# Patient Record
Sex: MALE | Race: WHITE | NOT HISPANIC OR LATINO | Employment: OTHER | URBAN - METROPOLITAN AREA
[De-identification: names, ages, dates, MRNs, and addresses within clinical notes are randomized per-mention and may not be internally consistent; named-entity substitution may affect disease eponyms.]

---

## 2018-05-03 ENCOUNTER — OFFICE VISIT (OUTPATIENT)
Dept: FAMILY MEDICINE | Facility: CLINIC | Age: 58
End: 2018-05-03
Payer: COMMERCIAL

## 2018-05-03 VITALS
SYSTOLIC BLOOD PRESSURE: 130 MMHG | DIASTOLIC BLOOD PRESSURE: 80 MMHG | HEART RATE: 76 BPM | OXYGEN SATURATION: 97 % | TEMPERATURE: 98 F | HEIGHT: 70 IN | WEIGHT: 207.25 LBS | BODY MASS INDEX: 29.67 KG/M2

## 2018-05-03 DIAGNOSIS — L25.9 CONTACT DERMATITIS, UNSPECIFIED CONTACT DERMATITIS TYPE, UNSPECIFIED TRIGGER: ICD-10-CM

## 2018-05-03 DIAGNOSIS — Z12.5 SCREENING FOR PROSTATE CANCER: ICD-10-CM

## 2018-05-03 DIAGNOSIS — H81.10 BENIGN PAROXYSMAL POSITIONAL VERTIGO, UNSPECIFIED LATERALITY: ICD-10-CM

## 2018-05-03 DIAGNOSIS — Z00.00 ANNUAL PHYSICAL EXAM: ICD-10-CM

## 2018-05-03 DIAGNOSIS — M25.551 CHRONIC RIGHT HIP PAIN: ICD-10-CM

## 2018-05-03 DIAGNOSIS — R06.83 SNORING: Primary | ICD-10-CM

## 2018-05-03 DIAGNOSIS — R42 VERTIGO: ICD-10-CM

## 2018-05-03 DIAGNOSIS — Z12.11 COLON CANCER SCREENING: ICD-10-CM

## 2018-05-03 DIAGNOSIS — G89.29 CHRONIC RIGHT HIP PAIN: ICD-10-CM

## 2018-05-03 PROCEDURE — 99204 OFFICE O/P NEW MOD 45 MIN: CPT | Mod: S$GLB,,, | Performed by: FAMILY MEDICINE

## 2018-05-03 PROCEDURE — 99999 PR PBB SHADOW E&M-NEW PATIENT-LVL III: CPT | Mod: PBBFAC,,, | Performed by: FAMILY MEDICINE

## 2018-05-03 RX ORDER — METHYLPREDNISOLONE 4 MG/1
TABLET ORAL
Qty: 1 PACKAGE | Refills: 0 | Status: SHIPPED | OUTPATIENT
Start: 2018-05-03 | End: 2018-05-24

## 2018-05-03 RX ORDER — MECLIZINE HYDROCHLORIDE 25 MG/1
25 TABLET ORAL 3 TIMES DAILY PRN
Qty: 30 TABLET | Refills: 2 | Status: SHIPPED | OUTPATIENT
Start: 2018-05-03 | End: 2022-09-16 | Stop reason: SDUPTHER

## 2018-05-03 RX ORDER — DICLOFENAC SODIUM 50 MG/1
50 TABLET, DELAYED RELEASE ORAL 2 TIMES DAILY PRN
Qty: 60 TABLET | Refills: 2 | Status: SHIPPED | OUTPATIENT
Start: 2018-05-03 | End: 2018-06-05 | Stop reason: SDUPTHER

## 2018-05-03 RX ORDER — TRIAMCINOLONE ACETONIDE 1 MG/G
CREAM TOPICAL 2 TIMES DAILY
Qty: 453 G | Refills: 0 | Status: SHIPPED | OUTPATIENT
Start: 2018-05-03 | End: 2021-11-15
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
## 2018-05-04 ENCOUNTER — PATIENT MESSAGE (OUTPATIENT)
Dept: FAMILY MEDICINE | Facility: CLINIC | Age: 58
End: 2018-05-04

## 2018-05-04 ENCOUNTER — LAB VISIT (OUTPATIENT)
Dept: LAB | Facility: HOSPITAL | Age: 58
End: 2018-05-04
Attending: FAMILY MEDICINE
Payer: COMMERCIAL

## 2018-05-04 DIAGNOSIS — Z12.5 SCREENING FOR PROSTATE CANCER: ICD-10-CM

## 2018-05-04 DIAGNOSIS — Z00.00 ANNUAL PHYSICAL EXAM: ICD-10-CM

## 2018-05-04 DIAGNOSIS — M16.11 ARTHRITIS OF RIGHT HIP: Primary | ICD-10-CM

## 2018-05-04 LAB
ALBUMIN SERPL BCP-MCNC: 3.8 G/DL
ALP SERPL-CCNC: 99 U/L
ALT SERPL W/O P-5'-P-CCNC: 35 U/L
ANION GAP SERPL CALC-SCNC: 6 MMOL/L
AST SERPL-CCNC: 23 U/L
BASOPHILS # BLD AUTO: 0.03 K/UL
BASOPHILS NFR BLD: 0.4 %
BILIRUB SERPL-MCNC: 0.6 MG/DL
BUN SERPL-MCNC: 17 MG/DL
CALCIUM SERPL-MCNC: 9.8 MG/DL
CHLORIDE SERPL-SCNC: 104 MMOL/L
CHOLEST SERPL-MCNC: 242 MG/DL
CHOLEST/HDLC SERPL: 5.4 {RATIO}
CO2 SERPL-SCNC: 28 MMOL/L
COMPLEXED PSA SERPL-MCNC: 1.5 NG/ML
CREAT SERPL-MCNC: 1.3 MG/DL
DIFFERENTIAL METHOD: ABNORMAL
EOSINOPHIL # BLD AUTO: 0.1 K/UL
EOSINOPHIL NFR BLD: 1 %
ERYTHROCYTE [DISTWIDTH] IN BLOOD BY AUTOMATED COUNT: 12.7 %
EST. GFR  (AFRICAN AMERICAN): >60 ML/MIN/1.73 M^2
EST. GFR  (NON AFRICAN AMERICAN): >60 ML/MIN/1.73 M^2
GLUCOSE SERPL-MCNC: 112 MG/DL
HCT VFR BLD AUTO: 45.8 %
HDLC SERPL-MCNC: 45 MG/DL
HDLC SERPL: 18.6 %
HGB BLD-MCNC: 15.7 G/DL
LDLC SERPL CALC-MCNC: 172 MG/DL
LYMPHOCYTES # BLD AUTO: 1.3 K/UL
LYMPHOCYTES NFR BLD: 17 %
MCH RBC QN AUTO: 28.7 PG
MCHC RBC AUTO-ENTMCNC: 34.3 G/DL
MCV RBC AUTO: 84 FL
MONOCYTES # BLD AUTO: 0.3 K/UL
MONOCYTES NFR BLD: 3.8 %
NEUTROPHILS # BLD AUTO: 6.1 K/UL
NEUTROPHILS NFR BLD: 77.8 %
NONHDLC SERPL-MCNC: 197 MG/DL
PLATELET # BLD AUTO: 234 K/UL
PMV BLD AUTO: 11.3 FL
POTASSIUM SERPL-SCNC: 5.7 MMOL/L
PROT SERPL-MCNC: 8.1 G/DL
RBC # BLD AUTO: 5.47 M/UL
SODIUM SERPL-SCNC: 138 MMOL/L
TRIGL SERPL-MCNC: 125 MG/DL
WBC # BLD AUTO: 7.81 K/UL

## 2018-05-04 PROCEDURE — 80053 COMPREHEN METABOLIC PANEL: CPT

## 2018-05-04 PROCEDURE — 85025 COMPLETE CBC W/AUTO DIFF WBC: CPT

## 2018-05-04 PROCEDURE — 36415 COLL VENOUS BLD VENIPUNCTURE: CPT | Mod: PN

## 2018-05-04 PROCEDURE — 84153 ASSAY OF PSA TOTAL: CPT

## 2018-05-04 PROCEDURE — 80061 LIPID PANEL: CPT

## 2018-05-08 ENCOUNTER — PATIENT MESSAGE (OUTPATIENT)
Dept: FAMILY MEDICINE | Facility: CLINIC | Age: 58
End: 2018-05-08

## 2018-05-08 DIAGNOSIS — R73.01 IMPAIRED FASTING GLUCOSE: Primary | ICD-10-CM

## 2018-05-09 ENCOUNTER — TELEPHONE (OUTPATIENT)
Dept: FAMILY MEDICINE | Facility: CLINIC | Age: 58
End: 2018-05-09

## 2018-05-09 DIAGNOSIS — Z12.11 SCREEN FOR COLON CANCER: Primary | ICD-10-CM

## 2018-05-09 DIAGNOSIS — R06.83 SNORING: Primary | ICD-10-CM

## 2018-05-09 DIAGNOSIS — R53.83 FATIGUE, UNSPECIFIED TYPE: ICD-10-CM

## 2018-05-15 ENCOUNTER — TELEPHONE (OUTPATIENT)
Dept: FAMILY MEDICINE | Facility: CLINIC | Age: 58
End: 2018-05-15

## 2018-05-17 ENCOUNTER — OFFICE VISIT (OUTPATIENT)
Dept: ORTHOPEDICS | Facility: CLINIC | Age: 58
End: 2018-05-17
Payer: COMMERCIAL

## 2018-05-17 VITALS — WEIGHT: 205.38 LBS | HEIGHT: 70 IN | BODY MASS INDEX: 29.4 KG/M2

## 2018-05-17 DIAGNOSIS — M16.11 PRIMARY OSTEOARTHRITIS OF RIGHT HIP: Primary | ICD-10-CM

## 2018-05-17 PROCEDURE — 99203 OFFICE O/P NEW LOW 30 MIN: CPT | Mod: S$GLB,,, | Performed by: PHYSICIAN ASSISTANT

## 2018-05-17 PROCEDURE — 3008F BODY MASS INDEX DOCD: CPT | Mod: CPTII,S$GLB,, | Performed by: PHYSICIAN ASSISTANT

## 2018-05-17 PROCEDURE — 99999 PR PBB SHADOW E&M-EST. PATIENT-LVL III: CPT | Mod: PBBFAC,,, | Performed by: PHYSICIAN ASSISTANT

## 2018-05-17 RX ORDER — MELOXICAM 15 MG/1
15 TABLET ORAL DAILY
Qty: 30 TABLET | Refills: 1 | Status: SHIPPED | OUTPATIENT
Start: 2018-05-17 | End: 2018-06-16
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
## 2018-05-17 NOTE — LETTER
May 17, 2018      Socorro Payton MD  [address] [place] [address]           [facility] - Orthopedics  [address], [address]  [facility] [address]  Phone: [phone]          Patient: Pierre Grossman   MR Number: [id number]   YOB: 1960   Date of Visit: 5/17/2018       Dear Dr. Socorro Payton:    Thank you for referring Pierre Grossman to me for evaluation. Attached you will find relevant portions of my assessment and plan of care.    If you have questions, please do not hesitate to call me. I look forward to following Pierre Grossman along with you.    Sincerely,    Helen Wells PA-C    Enclosure  CC:  No Recipients    If you would like to receive this communication electronically, please contact [email or web address] or [phone] to request more information on [employer] Link access.    For providers and/or their staff who would like to refer a patient to [facility], please contact us through our one-stop-shop provider referral line, [facility], at [phone].    If you feel you have received this communication in error or would no longer like to receive these types of communications, please e-mail [email or web address]

## 2018-05-18 ENCOUNTER — PATIENT MESSAGE (OUTPATIENT)
Dept: FAMILY MEDICINE | Facility: CLINIC | Age: 58
End: 2018-05-18

## 2018-05-30 ENCOUNTER — ANESTHESIA EVENT (OUTPATIENT)
Dept: ENDOSCOPY | Facility: HOSPITAL | Age: 58
End: 2018-05-30
Payer: COMMERCIAL

## 2018-05-30 ENCOUNTER — ANESTHESIA (OUTPATIENT)
Dept: ENDOSCOPY | Facility: HOSPITAL | Age: 58
End: 2018-05-30
Payer: COMMERCIAL

## 2018-05-30 ENCOUNTER — HOSPITAL ENCOUNTER (OUTPATIENT)
Facility: HOSPITAL | Age: 58
Discharge: HOME | End: 2018-05-30
Attending: INTERNAL MEDICINE | Admitting: INTERNAL MEDICINE
Payer: COMMERCIAL

## 2018-05-30 VITALS
RESPIRATION RATE: 18 BRPM | WEIGHT: 201 LBS | SYSTOLIC BLOOD PRESSURE: 152 MMHG | HEIGHT: 70 IN | BODY MASS INDEX: 28.77 KG/M2 | HEART RATE: 62 BPM | TEMPERATURE: 98 F | DIASTOLIC BLOOD PRESSURE: 89 MMHG | OXYGEN SATURATION: 99 %

## 2018-05-30 DIAGNOSIS — Z12.11 ENCOUNTER FOR SCREENING COLONOSCOPY: ICD-10-CM

## 2018-05-30 PROCEDURE — 37000009 HC ANESTHESIA EA ADD 15 MINS: Performed by: INTERNAL MEDICINE

## 2018-05-30 PROCEDURE — D9220A PRA ANESTHESIA: Mod: ANES,,, | Performed by: ANESTHESIOLOGY

## 2018-05-30 PROCEDURE — 63600175 PHARM REV CODE 636 W HCPCS: Performed by: REGISTERED NURSE

## 2018-05-30 PROCEDURE — 25000003 PHARM REV CODE 250: Performed by: ANESTHESIOLOGY

## 2018-05-30 PROCEDURE — D9220A PRA ANESTHESIA: Mod: CRNA,,, | Performed by: REGISTERED NURSE

## 2018-05-30 PROCEDURE — G0121 COLON CA SCRN NOT HI RSK IND: HCPCS | Performed by: INTERNAL MEDICINE

## 2018-05-30 PROCEDURE — 37000008 HC ANESTHESIA 1ST 15 MINUTES: Performed by: INTERNAL MEDICINE

## 2018-05-30 RX ORDER — LIDOCAINE HYDROCHLORIDE 20 MG/ML
INJECTION, SOLUTION EPIDURAL; INFILTRATION; INTRACAUDAL; PERINEURAL
Status: DISCONTINUED
Start: 2018-05-30 | End: 2018-05-30 | Stop reason: HOSPADM

## 2018-05-30 RX ORDER — LIDOCAINE HYDROCHLORIDE 10 MG/ML
1 INJECTION, SOLUTION EPIDURAL; INFILTRATION; INTRACAUDAL; PERINEURAL ONCE
Status: DISCONTINUED | OUTPATIENT
Start: 2018-05-30 | End: 2018-05-30 | Stop reason: HOSPADM

## 2018-05-30 RX ORDER — PROPOFOL 10 MG/ML
VIAL (ML) INTRAVENOUS
Status: DISCONTINUED | OUTPATIENT
Start: 2018-05-30 | End: 2018-05-30

## 2018-05-30 RX ORDER — PROPOFOL 10 MG/ML
VIAL (ML) INTRAVENOUS
Status: DISCONTINUED
Start: 2018-05-30 | End: 2018-05-30 | Stop reason: HOSPADM

## 2018-05-30 RX ORDER — SODIUM CHLORIDE 9 MG/ML
INJECTION, SOLUTION INTRAVENOUS CONTINUOUS
Status: DISCONTINUED | OUTPATIENT
Start: 2018-05-30 | End: 2018-05-30 | Stop reason: HOSPADM

## 2018-05-30 RX ORDER — LIDOCAINE HCL/PF 100 MG/5ML
SYRINGE (ML) INTRAVENOUS
Status: DISCONTINUED | OUTPATIENT
Start: 2018-05-30 | End: 2018-05-30

## 2018-05-30 RX ADMIN — PROPOFOL 70 MG: 10 INJECTION, EMULSION INTRAVENOUS at 08:05

## 2018-05-30 RX ADMIN — PROPOFOL 30 MG: 10 INJECTION, EMULSION INTRAVENOUS at 08:05

## 2018-05-30 RX ADMIN — LIDOCAINE HYDROCHLORIDE 100 MG: 20 INJECTION, SOLUTION INTRAVENOUS at 08:05

## 2018-05-30 RX ADMIN — PROPOFOL 20 MG: 10 INJECTION, EMULSION INTRAVENOUS at 08:05

## 2018-05-30 RX ADMIN — SODIUM CHLORIDE: 0.9 INJECTION, SOLUTION INTRAVENOUS at 07:05

## 2018-05-30 NOTE — PROVATION PATIENT INSTRUCTIONS
Discharge Summary/Instructions after an Endoscopic Procedure  Patient Name: Pierre Grossman  Patient MRN: [id number]  Patient YOB: 1960  Wednesday, May 30, 2018  Zakia Mirza MD  RESTRICTIONS:  During your procedure today, you received medications for sedation.  These   medications may affect your judgment, balance and coordination.  Therefore,   for 24 hours, you have the following restrictions:   - DO NOT drive a car, operate machinery, make legal/financial decisions,   sign important papers or drink alcohol.    ACTIVITY:  The following day: return to full activity including work, except no heavy   lifting, straining or running for 3 days if polyps were removed.  DIET:  Eat and drink normally unless instructed otherwise.     TREATMENT FOR COMMON SIDE EFFECTS:  - Mild abdominal pain, nausea, belching, bloating or excessive gas:  rest,   eat lightly and use a heating pad.  - Sore Throat: treat with throat lozenges and/or gargle with warm salt   water.  - Because air was used during the procedure, expelling large amounts of air   from your rectum or belching is normal.  - If a bowel prep was taken, you may not have a bowel movement for 1-3 days.    This is normal.  SYMPTOMS TO WATCH FOR AND REPORT TO YOUR PHYSICIAN:  1. Abdominal pain or bloating, other than gas cramps.  2. Chest pain.  3. Back pain.  4. Signs of infection such as: chills or fever occurring within 24 hours   after the procedure.  5. Rectal bleeding, which would show as bright red, maroon, or black stools.   (A tablespoon of blood from the rectum is not serious, especially if   hemorrhoids are present.)  6. Vomiting.  7. Weakness or dizziness.  GO DIRECTLY TO THE NEAREST EMERGENCY ROOM IF YOU HAVE ANY OF THE FOLLOWING:      Difficulty breathing              Chills and/or fever over 101 F   Persistent vomiting and/or vomiting blood   Severe abdominal pain   Severe chest pain   Black, tarry stools   Bleeding- more than one  tablespoon   Any other symptom or condition that you feel may need urgent attention  Your doctor recommends these additional instructions:  If any biopsies were taken, your doctors clinic will contact you in 1 to 2   weeks with any results.  - Discharge patient to home (with escort).   - Resume previous diet indefinitely.   - Repeat colonoscopy in 10 years for screening purposes.   - Return to primary care physician as previously scheduled.  For questions, problems or results please call your physician - Zakia Mirza MD at Work:  [phone].  [facility] can be reached at [phone]     IF A COMPLICATION OR EMERGENCY SITUATION ARISES AND YOU ARE UNABLE TO REACH   YOUR PHYSICIAN - GO DIRECTLY TO THE EMERGENCY ROOM.  Zakia Mirza MD  5/30/2018 8:44:45 AM  This report has been verified and signed electronically.  PROVATION

## 2018-05-30 NOTE — H&P
Pre-Procedure H&P:  Reason for Procedure: screen    HPI:  Pt is a 58 y.o. male screen    Past Medical History:   Diagnosis Date    Head injury 1986       Past Surgical History:   Procedure Laterality Date    CERVICAL FUSION      EYE SURGERY      cataract    HERNIA REPAIR         Family History   Problem Relation Age of Onset    Aneurysm Mother         brain    Pancreatitis Father     Heart attack Neg Hx     Hypertension Neg Hx        Social History     Social History    Marital status:      Spouse name: N/A    Number of children: N/A    Years of education: N/A     Occupational History    Not on file.     Social History Main Topics    Smoking status: Never Smoker    Smokeless tobacco: Never Used    Alcohol use No    Drug use: No    Sexual activity: Yes     Partners: Female     Other Topics Concern    Not on file     Social History Narrative    No narrative on file       Endoscopic History:      Review of patient's allergies indicates:   Allergen Reactions    Iodinated contrast- oral and iv dye Shortness Of Breath and Swelling       No current facility-administered medications on file prior to encounter.      Current Outpatient Prescriptions on File Prior to Encounter   Medication Sig Dispense Refill    diclofenac (VOLTAREN) 50 MG EC tablet Take 1 tablet (50 mg total) by mouth 2 (two) times daily as needed. 60 tablet 2    meclizine (ANTIVERT) 25 mg tablet Take 1 tablet (25 mg total) by mouth 3 (three) times daily as needed. 30 tablet 2    triamcinolone acetonide 0.1% (KENALOG) 0.1 % cream Apply topically 2 (two) times daily. 453 g 0       Current Facility-Administered Medications:     0.9%  NaCl infusion, , Intravenous, Continuous, Clyde Branch Jr., MD, Last Rate: 10 mL/hr at 05/30/18 0753    lidocaine (PF) 10 mg/ml (1%) injection 10 mg, 1 mL, Intradermal, Once, Clyde Branch Jr., MD    ROS: Negative x 10    Patient Vitals for the past 24 hrs:   BP Temp Temp src Resp SpO2 Height  "Weight   05/30/18 0750 (!) 147/82 97.5 °F (36.4 °C) Oral 16 95 % 5' 10" (1.778 m) 91.2 kg (201 lb)       Gen: Well developed, well nourished, no apparent distress  HEENT: Anicteric, PERRLA  CV: S1, S2, no murmers/rubs, non-displaced PMI  Lungs: CTA-B, normal excursion  Abd: Soft, NT, ND, normal BS's, no HSM  Ext: No c/c/e, 1+ DP pulses to BLE's  Neuro: CN II-XII grossly intact, no asterixis.  Skin: No rashes/lesions.  Psych: AA&O x 4    Assessment:  Pt. Is a 58 y.o. male with:  1. screen    Recommendations:  1. Colonoscopy  2. F/U with PCP      I would like to take this opportunity to thank you for this consult.  If you have any questions or concerns, please do not hesitate to contact me.       "

## 2018-05-30 NOTE — TRANSFER OF CARE
"Anesthesia Transfer of Care Note    Patient: Pierre Grossman    Procedure(s) Performed: Procedure(s) (LRB):  COLONOSCOPY (N/A)    Patient location: GI    Anesthesia Type: general    Transport from OR: Transported from OR on room air with adequate spontaneous ventilation    Post pain: adequate analgesia    Post assessment: no apparent anesthetic complications and tolerated procedure well    Post vital signs: stable    Level of consciousness: responds to stimulation    Nausea/Vomiting: no nausea/vomiting    Complications: none    Transfer of care protocol was followed      Last vitals:   Visit Vitals  /68   Pulse 61   Temp 36.8 °C (98.2 °F) (Oral)   Resp 18   Ht 5' 10" (1.778 m)   Wt 91.2 kg (201 lb)   SpO2 98%   BMI 28.84 kg/m²     "

## 2018-05-30 NOTE — ANESTHESIA PREPROCEDURE EVALUATION
05/30/2018  Pierre Grossman is a 58 y.o., male.    Pre-op diagnosis: Colon cancer screening [Z12.11]    Pre-operative evaluation for Procedure(s) (LRB):  COLONOSCOPY (N/A)    Review of patient's allergies indicates:   Allergen Reactions    Iodinated contrast- oral and iv dye Shortness Of Breath and Swelling       No current facility-administered medications on file prior to encounter.      Current Outpatient Prescriptions on File Prior to Encounter   Medication Sig Dispense Refill    diclofenac (VOLTAREN) 50 MG EC tablet Take 1 tablet (50 mg total) by mouth 2 (two) times daily as needed. 60 tablet 2    meclizine (ANTIVERT) 25 mg tablet Take 1 tablet (25 mg total) by mouth 3 (three) times daily as needed. 30 tablet 2    triamcinolone acetonide 0.1% (KENALOG) 0.1 % cream Apply topically 2 (two) times daily. 453 g 0       There is no problem list on file for this patient.      Past Surgical History:   Procedure Laterality Date    CERVICAL FUSION      EYE SURGERY      cataract    HERNIA REPAIR             No results for input(s): HCT in the last 72 hours.  No results for input(s): PLT in the last 72 hours.  No results for input(s): K in the last 72 hours.  No results for input(s): CREATININE in the last 72 hours.  No results for input(s): GLU in the last 72 hours.  No results for input(s): PT in the last 72 hours.                    Anesthesia Evaluation         Review of Systems  Anesthesia Hx:  No problems with previous Anesthesia Gets nausea with pain meds, says phenergan and zofran usually help   Social:  Non-Smoker, No Alcohol Use    Hematology/Oncology:  Hematology Normal   Oncology Normal   Hematology Comments: Potassium 5.7    Cardiovascular:   Denies Hypertension.  Denies MI.    Denies Angina.    Pulmonary:  Pulmonary Normal  Denies COPD.  Denies Asthma.  Denies Shortness of breath.    Renal/:    Denies Chronic Renal Disease.     Hepatic/GI:   Denies Liver Disease.    Neurological:   Denies TIA. Denies CVA. Denies Seizures.    Endocrine:   Denies Diabetes.        Physical Exam  General:  Well nourished    Airway/Jaw/Neck:  Airway Findings: Mouth Opening: Normal Tongue: Normal  General Airway Assessment: Adult, Average  Mallampati: II  TM Distance: Normal, at least 6 cm  Jaw/Neck Findings:  Neck ROM: Normal ROM       Chest/Lungs:  Chest/Lungs Findings: Clear to auscultation     Heart/Vascular:  Heart Findings: Rate: Normal  Rhythm: Regular Rhythm  Sounds: Normal        Mental Status:  Mental Status Findings:  Cooperative, Alert and Oriented         Anesthesia Plan  Type of Anesthesia, risks & benefits discussed:  Anesthesia Type:  general  Patient's Preference:   Intra-op Monitoring Plan:   Intra-op Monitoring Plan Comments:   Post Op Pain Control Plan:   Post Op Pain Control Plan Comments: As per surgeon's plan  Induction:   IV  Beta Blocker:  Patient is not currently on a Beta-Blocker (No further documentation required).       Informed Consent: Patient understands risks and agrees with Anesthesia plan.  Questions answered. Anesthesia consent signed with patient.  ASA Score: 2     Day of Surgery Review of History & Physical:    H&P update referred to the surgeon.         Ready For Surgery From Anesthesia Perspective.

## 2018-05-30 NOTE — ANESTHESIA POSTPROCEDURE EVALUATION
"Anesthesia Post Evaluation    Patient: Pierre Grossman    Procedure(s) Performed: Procedure(s) (LRB):  COLONOSCOPY (N/A)    Final Anesthesia Type: general  Patient location during evaluation: PACU  Patient participation: Yes- Able to Participate  Level of consciousness: awake and alert and oriented  Post-procedure vital signs: reviewed and stable  Pain management: adequate  Airway patency: patent  PONV status at discharge: No PONV  Anesthetic complications: no      Cardiovascular status: stable  Respiratory status: unassisted, spontaneous ventilation and room air  Hydration status: euvolemic  Follow-up not needed.        Visit Vitals  /68   Pulse 61   Temp 36.8 °C (98.2 °F) (Oral)   Resp 18   Ht 5' 10" (1.778 m)   Wt 91.2 kg (201 lb)   SpO2 98%   BMI 28.84 kg/m²       Pain/Mark Score: Pain Assessment Performed: Yes (5/30/2018  7:45 AM)  Presence of Pain: denies (5/30/2018  8:47 AM)  Mark Score: 10 (5/30/2018  8:47 AM)      "

## 2018-06-05 ENCOUNTER — OFFICE VISIT (OUTPATIENT)
Dept: ORTHOPEDICS | Facility: CLINIC | Age: 58
End: 2018-06-05
Payer: COMMERCIAL

## 2018-06-05 ENCOUNTER — HOSPITAL ENCOUNTER (OUTPATIENT)
Dept: RADIOLOGY | Facility: HOSPITAL | Age: 58
Discharge: HOME | End: 2018-06-05
Attending: ORTHOPAEDIC SURGERY
Payer: COMMERCIAL

## 2018-06-05 VITALS
BODY MASS INDEX: 30.15 KG/M2 | SYSTOLIC BLOOD PRESSURE: 164 MMHG | HEART RATE: 62 BPM | WEIGHT: 210.63 LBS | DIASTOLIC BLOOD PRESSURE: 97 MMHG | HEIGHT: 70 IN

## 2018-06-05 DIAGNOSIS — M16.11 PRIMARY OSTEOARTHRITIS OF RIGHT HIP: ICD-10-CM

## 2018-06-05 DIAGNOSIS — M16.11 PRIMARY OSTEOARTHRITIS OF RIGHT HIP: Primary | ICD-10-CM

## 2018-06-05 DIAGNOSIS — G89.29 CHRONIC RIGHT HIP PAIN: ICD-10-CM

## 2018-06-05 DIAGNOSIS — M25.551 CHRONIC RIGHT HIP PAIN: ICD-10-CM

## 2018-06-05 PROCEDURE — 73502 X-RAY EXAM HIP UNI 2-3 VIEWS: CPT | Mod: 26,RT,, | Performed by: RADIOLOGY

## 2018-06-05 PROCEDURE — 99999 PR PBB SHADOW E&M-EST. PATIENT-LVL III: CPT | Mod: PBBFAC,,, | Performed by: ORTHOPAEDIC SURGERY

## 2018-06-05 PROCEDURE — 3008F BODY MASS INDEX DOCD: CPT | Mod: CPTII,S$GLB,, | Performed by: ORTHOPAEDIC SURGERY

## 2018-06-05 PROCEDURE — 99214 OFFICE O/P EST MOD 30 MIN: CPT | Mod: S$GLB,,, | Performed by: ORTHOPAEDIC SURGERY

## 2018-06-05 PROCEDURE — 73502 X-RAY EXAM HIP UNI 2-3 VIEWS: CPT | Mod: TC,RT

## 2018-06-05 RX ORDER — DICLOFENAC SODIUM 50 MG/1
50 TABLET, DELAYED RELEASE ORAL 2 TIMES DAILY PRN
Qty: 60 TABLET | Refills: 2 | Status: SHIPPED | OUTPATIENT
Start: 2018-06-05 | End: 2018-09-10 | Stop reason: SDUPTHER

## 2018-06-05 NOTE — LETTER
June 5, 2018      Helen Wells PA-C  [address]  [facility] [address]           [facility] - Orthopedics  [address]  [facility] [address]  Phone: [phone]          Patient: Pierre Grossman   MR Number: [id number]   YOB: 1960   Date of Visit: 6/5/2018       Dear Helen Wells:    Thank you for referring Pierre Grossman to me for evaluation. Attached you will find relevant portions of my assessment and plan of care.    If you have questions, please do not hesitate to call me. I look forward to following Pierre Grossman along with you.    Sincerely,    [facility] MD    Enclosure  CC:  No Recipients    If you would like to receive this communication electronically, please contact [email or web address] or [phone] to request more information on [employer] Link access.    For providers and/or their staff who would like to refer a patient to [facility], please contact us through our one-stop-shop provider referral line, [facility], at [phone].    If you feel you have received this communication in error or would no longer like to receive these types of communications, please e-mail [email or web address]

## 2018-06-05 NOTE — PROGRESS NOTES
Subjective:      Patient ID: Pierre Grossman is a 58 y.o. male.    Chief Complaint: Pain of the Right Hip    Pain   Pertinent negatives include no chest pain, chills, coughing, fever or numbness.     58 year old male presents with chief complaint of right hip pain for a few years.  He states that there was no acute injury but he feels groin pain with activities.  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 He took medrol dose pack which helped. Diclofenac does help.  Denies numbness and tingling.      Review of Systems   Constitution: Negative for chills, fever and night sweats.   Cardiovascular: Negative for chest pain.   Respiratory: Negative for cough and shortness of breath.    Hematologic/Lymphatic: Does not bruise/bleed easily.   Skin: Negative for color change.   Gastrointestinal: Negative for heartburn.   Genitourinary: Negative for dysuria.   Neurological: Negative for numbness and paresthesias.   Psychiatric/Behavioral: Negative for altered mental status.   Allergic/Immunologic: Negative for persistent infections.         Objective:            Ortho/SPM Exam    Gen:  No acute distress  CV:  Peripherally well-perfused.  Pulses 2+ bilaterally.  Lungs:  Normal respiratory effort.  Abdomen:  Soft, non-tender, non-distended  Head/Neck:  Normocephalic.  Atraumatic. No TTP, AROM and PROM intact without pain  Neuro:  CN intact without deficit, SILT throughout B/L Upper & Lower Extremities  Pelvis: No TTP    MSK:    Bilateral LE:  - R hip has significant pain with ROM and severely limited ROM with 5 degrees internal rotation and 20 degrees external rotation  -L  Hip has painless ROM  - AROM and PROM of bilateral knees is intact and without pain  -Critical access hospital is positive  -log roll negative   - TA/EHL/Gastroc/FHL assessed in isolation without deficit  - SILT throughout  - DP and PT palpated  2+  -  Capillary Refill <3s        X-ray: reviewed by myself. Advanced degenerative changes right hip.        Assessment:       Encounter Diagnoses   Name Primary?    Primary osteoarthritis of right hip Yes    Chronic right hip pain           Plan:       Pierre Grossman is a 58 y.o. male with severe R hip OA.    -He is interested in a R hip replacement and we discussed at great lengths the different management options both surgical and non surgical  -He will look at his schedule and call us back  -Template x rays done today   -I have explained the risks, benefits, and alternatives of the procedure in detail.  The patient voices understanding and all questions have been answered.  The patient agrees to proceed as planned.

## 2018-06-14 ENCOUNTER — PATIENT MESSAGE (OUTPATIENT)
Dept: FAMILY MEDICINE | Facility: CLINIC | Age: 58
End: 2018-06-14

## 2018-06-19 ENCOUNTER — OFFICE VISIT (OUTPATIENT)
Dept: SLEEP MEDICINE | Facility: CLINIC | Age: 58
End: 2018-06-19
Payer: COMMERCIAL

## 2018-06-19 VITALS
SYSTOLIC BLOOD PRESSURE: 132 MMHG | DIASTOLIC BLOOD PRESSURE: 82 MMHG | HEIGHT: 70 IN | HEART RATE: 62 BPM | BODY MASS INDEX: 29.72 KG/M2 | OXYGEN SATURATION: 95 % | WEIGHT: 207.56 LBS

## 2018-06-19 DIAGNOSIS — G47.33 OSA (OBSTRUCTIVE SLEEP APNEA): Primary | ICD-10-CM

## 2018-06-19 DIAGNOSIS — R42 VERTIGO: ICD-10-CM

## 2018-06-19 PROCEDURE — 99244 OFF/OP CNSLTJ NEW/EST MOD 40: CPT | Mod: S$GLB,,, | Performed by: INTERNAL MEDICINE

## 2018-06-19 PROCEDURE — 99999 PR PBB SHADOW E&M-EST. PATIENT-LVL III: CPT | Mod: PBBFAC,,, | Performed by: INTERNAL MEDICINE

## 2018-06-19 NOTE — PATIENT INSTRUCTIONS
Benign Paroxysmal Positional Vertigo     Your health care provider may move your head in certain ways to treat your BPPV.     Benign paroxysmal positional vertigo (BPPV) is a problem with the inner ear. The inner ear contains the vestibular system. This system is what helps you keep your balance. BPPV causes a feeling of spinning. It is a common problem of the vestibular system.  Understanding the vestibular system  The vestibular system of the ear is made up of very tiny parts. They include the utricle, saccule, and semicircular canals. The utricle is a tiny organ that contains calcium crystals. In some people, the crystals can move into the semicircular canals. When this happens, the system no longer works as it should. This causes BPPV. Benign means it is not life-threatening. Paroxysmal means it happens suddenly. Positional means that it happens when you move your head. Vertigo is a feeling of spinning.  What causes BPPV?  Causes include injury to your head or neck. Other problems with the vestibular system may cause BPPV. In many people, the cause of BPPV is not known.  Symptoms of BPPV  You many have repeated feelings of spinning (vertigo). The vertigo usually lasts less than 1 minute. Some movements, suchas rolling over in bed, can bring on vertigo.  Diagnosing BPPV  Your primary health care provider may diagnose and treat your BPPV. Or you may see an ear, nose, and throat doctor (otolaryngologist). In some cases, you may see a nervous system doctor (neurologist).  The health care provider will ask about your symptoms and your medical history. He or she will examine you. You may have hearing and balance tests. As part of the exam, your health care provider may have you move your head and body in certain ways. If you have BPPV, the movements can bring on vertigo. Your provider will also look for abnormal movements of your eyes. You may have other tests to check your vestibular or nervous systems.  Treatment  for BPPV  Your health care provider may try to move the calcium crystals. This is done by having you move your head and neck in certain ways. This treatment is safe and often works well. You may also be told to do these movements at home. You may still have vertigo for a few weeks. Your health care provider will recheck your symptoms, usually in about a month. Special physical therapy may also be part of treatment. In rare cases surgery may be needed for BPPV that does not go away.     When to call the health care provider  Call your health care provider right away if you have any of these:  · Symptoms that do not go away with treatment  · Symptoms that get worse  · New symptoms   Date Last Reviewed: 3/19/2015  © 5117-9990 [employer]. [address] [place] [address], [place] [address]. All rights reserved. This information is not intended as a substitute for professional medical care. Always follow your healthcare professional's instructions.        website for vertigo exercise  http://www.youtube.com/watch?v=ZqokxZRbJfw      Flavai or Miguelangel will contact you to schedule your sleep study. Their number is [phone] (ext 2). The [facility] [place] [facility] is located on 7th floor of the [facility].    We will call you when the sleep study results are ready - if you have not heard from us by 2 weeks from the date of the study, please call [phone] (ext 1).    You are advised to abstain from driving should you feel sleepy or drowsy.

## 2018-06-19 NOTE — LETTER
June 19, 2018      Socorro Payton MD  [address] [place] [address]           [facility]  [address] [facility]  [place] [address]  Phone: [phone]  Fax: [phone]          Patient: Pierre Grossman   MR Number: [id number]   YOB: 1960   Date of Visit: 6/19/2018       Dear Dr. Socorro Payton:    Thank you for referring Pierre Grossman to me for evaluation. Attached you will find relevant portions of my assessment and plan of care.    If you have questions, please do not hesitate to call me. I look forward to following Pierre Grossman along with you.    Sincerely,    Lg Martinez MD    Enclosure  CC:  No Recipients    If you would like to receive this communication electronically, please contact [email or web address] or [phone] to request more information on [employer] Link access.    For providers and/or their staff who would like to refer a patient to [facility], please contact us through our one-stop-shop provider referral line, [facility], at [phone].    If you feel you have received this communication in error or would no longer like to receive these types of communications, please e-mail [email or web address]

## 2018-06-19 NOTE — PROGRESS NOTES
Pierre Grossman  was seen as a new patient at the request of  Socorro Payton MD for the evaluation of don.    CHIEF COMPLAINT:    Chief Complaint   Patient presents with    Sleep Apnea    Snoring    Fatigue       HISTORY OF PRESENT ILLNESS: Pierre Grossman is a 58 y.o. male is here for sleep evaluation.   Patient with loud snoring.  +fatigue upon awake daily.  +witnessed sleep apnea per wife.  No parasomnia.  No cataplexy.      Chronic hip pain day and night.  Can cause discomfort when sleeping at night.      [place] Sleepiness Scale score during initial sleep evaluation was 14.    SLEEP ROUTINE:  Activity the hour prior to sleep: watch tv, then shower  Bed partner:  Wife   Time to bed:  11:30 pm   Lights off:  yes  Sleep onset latency:  5 minutes        Disruptions or awakenings:    0-1 times (no difficulty going back sleep)    Wakeup time:      7:45 am   Perceived sleep quality:  tire       Daytime naps:      none  Weekend sleep routine:      12 am to 9 am (still tire)  Caffeine use: 1 cup of coffee in am   exercise habit:   Active at work as construction      PAST MEDICAL HISTORY:    Active Ambulatory Problems     Diagnosis Date Noted    Encounter for screening colonoscopy 05/30/2018     Resolved Ambulatory Problems     Diagnosis Date Noted    No Resolved Ambulatory Problems     Past Medical History:   Diagnosis Date    Head injury 1986                PAST SURGICAL HISTORY:    Past Surgical History:   Procedure Laterality Date    CERVICAL FUSION      COLONOSCOPY N/A 5/30/2018    Procedure: COLONOSCOPY;  Surgeon: Zakia Mirza MD;  [facility];  Service: Endoscopy;  Laterality: N/A;    EYE SURGERY      cataract    HERNIA REPAIR           FAMILY HISTORY:                Family History   Problem Relation Age of Onset    Aneurysm Mother         brain    Pancreatitis Father     Heart attack Neg Hx     Hypertension Neg Hx        SOCIAL HISTORY:          Tobacco:   History   Smoking Status  "   Never Smoker   Smokeless Tobacco    Never Used       alcohol use:    History   Alcohol Use No                 Occupation:  .     ALLERGIES:    Review of patient's allergies indicates:   Allergen Reactions    Iodinated contrast- oral and iv dye Shortness Of Breath and Swelling       CURRENT MEDICATIONS:    Current Outpatient Prescriptions   Medication Sig Dispense Refill    diclofenac (VOLTAREN) 50 MG EC tablet Take 1 tablet (50 mg total) by mouth 2 (two) times daily as needed. 60 tablet 2    meclizine (ANTIVERT) 25 mg tablet Take 1 tablet (25 mg total) by mouth 3 (three) times daily as needed. 30 tablet 2    triamcinolone acetonide 0.1% (KENALOG) 0.1 % cream Apply topically 2 (two) times daily. 453 g 0     No current facility-administered medications for this visit.                   REVIEW OF SYSTEMS:     Sleep related symptoms as per HPI.  CONST:Denies weight gain    HEENT: Denies sinus congestion  PULM: Denies dyspnea  CARD:  + palpitations with overexertion  GI:  Denies acid reflux  : Denies polyuria  NEURO: Denies headaches  PSYCH: Denies mood disturbance  HEME: Denies anemia   Otherwise, a balance of systems reviewed is negative.          PHYSICAL EXAM:  Vitals:    06/19/18 0838   BP: 132/82   Pulse: 62   SpO2: 95%   Weight: 94.2 kg (207 lb 9 oz)   Height: 5' 10" (1.778 m)   PainSc:   4   PainLoc: Wrist     Body mass index is 29.78 kg/m².     GENERAL: Normal development, well groomed  HEENT:  Conjunctivae are non-erythematous; Pupils equal, round, and reactive to light; Nose is symmetrical; Nasal mucosa is pink and moist; Septum is midline; Inferior turbinates are normal; Nasal airflow is normal; Posterior pharynx is pink; Modified Mallampati: 4; Posterior palate is normal; Tonsils +1; Uvula is normal and pink;Tongue is normal; Dentition is fair; No TMJ tenderness; Jaw opening and protrusion without click and without discomfort.  NECK: Supple. Neck circumference is 16 inches. No " thyromegaly. No palpable nodes.     SKIN: On face and neck: No abrasions, no rashes, no lesions.  No subcutaneous nodules are palpable.  RESPIRATORY: Chest is clear to auscultation.  Normal chest expansion and non-labored breathing at rest.  CARDIOVASCULAR: Normal S1, S2.  No murmurs, gallops or rubs. No carotid bruits bilaterally.  EXTREMITIES: No edema. No clubbing. No cyanosis. Station normal. Gait normal.        NEURO/PSYCH: Oriented to time, place and person. Normal attention span and concentration. Affect is full. Mood is normal.                                              DATA no prior sleep study  No results found for: TSH  ASSESSMENT    ICD-10-CM ICD-9-CM    1. DOMINIK (obstructive sleep apnea) G47.33 327.23 Home Sleep Studies       PLAN:    Sleep Apnea NEC. The patient symptomatically has loud snoring, witnessed apnes, restless sleep with findings of daytime somnolence, high grade mallampatti. This warrants further investigation for possible obstructive sleep apnea.  Patient will be contacted after sleep study is done.      Vertigo - difficulty with supine position.  Educational material d/w patient.  epley manuver discussed.  Follow up with pcp.    Diagnostic: Polysomnogram. The nature of this procedure and its indication was discussed with the patient.     Education: During our discussion today, we talked about the etiology of obstructive sleep apnea as well as the potential ramifications of untreated sleep apnea, which could include daytime sleepiness, hypertension, heart disease and/or stroke.     Precautions: The patient was advised to abstain from driving should they feel sleepy or drowsy.       Thank you for allowing me the opportunity to participate in the care of your patient.    Patient will No Follow-up on file. with md/np.    Please cc note to  Socorro Payton MD.    This is 45 minutes visit, over 50% of time spent in direct consultation with patient.

## 2018-06-22 ENCOUNTER — TELEPHONE (OUTPATIENT)
Dept: SLEEP MEDICINE | Facility: HOSPITAL | Age: 58
End: 2018-06-22

## 2018-06-29 ENCOUNTER — TELEPHONE (OUTPATIENT)
Dept: SLEEP MEDICINE | Facility: HOSPITAL | Age: 58
End: 2018-06-29

## 2018-07-19 ENCOUNTER — TELEPHONE (OUTPATIENT)
Dept: SLEEP MEDICINE | Facility: HOSPITAL | Age: 58
End: 2018-07-19

## 2018-07-31 ENCOUNTER — TELEPHONE (OUTPATIENT)
Dept: SLEEP MEDICINE | Facility: HOSPITAL | Age: 58
End: 2018-07-31

## 2018-08-01 ENCOUNTER — TELEPHONE (OUTPATIENT)
Dept: SLEEP MEDICINE | Facility: HOSPITAL | Age: 58
End: 2018-08-01

## 2018-08-06 ENCOUNTER — HOSPITAL ENCOUNTER (OUTPATIENT)
Dept: SLEEP MEDICINE | Facility: HOSPITAL | Age: 58
Discharge: HOME | End: 2018-08-06
Attending: INTERNAL MEDICINE
Payer: COMMERCIAL

## 2018-08-06 DIAGNOSIS — G47.33 OSA (OBSTRUCTIVE SLEEP APNEA): ICD-10-CM

## 2018-08-06 PROCEDURE — 95800 SLP STDY UNATTENDED: CPT | Mod: 26,,, | Performed by: INTERNAL MEDICINE

## 2018-08-06 PROCEDURE — 95800 SLP STDY UNATTENDED: CPT

## 2018-08-06 PROCEDURE — 95800 PR SLEEP STUDY, UNATTENDED, RECORD HEART RATE/O2 SAT/RESP ANAL/SLEEP TIME: ICD-10-PCS | Mod: 26,,, | Performed by: INTERNAL MEDICINE

## 2018-08-07 NOTE — PATIENT INSTRUCTIONS
Your sleep study will be scored and interpreted by one of our physicians who are board certified in sleep medicine.  Within two weeks the results will be sent to the physician who referred you. Your physician should then contact you to go over the results, along with any recommendations. If you do not hear from your physician within two weeks, please call them.

## 2018-08-14 ENCOUNTER — TELEPHONE (OUTPATIENT)
Dept: SLEEP MEDICINE | Facility: CLINIC | Age: 58
End: 2018-08-14

## 2018-08-14 NOTE — TELEPHONE ENCOUNTER
----- Message from Lg Martinez MD sent at 8/14/2018  4:18 PM CDT -----  Please schedule sleep clinic appointmetnt with md/np in 1-2 weeks to discuss sleep study result.

## 2018-08-16 ENCOUNTER — TELEPHONE (OUTPATIENT)
Dept: SLEEP MEDICINE | Facility: CLINIC | Age: 58
End: 2018-08-16

## 2018-08-16 NOTE — TELEPHONE ENCOUNTER
----- Message from Barbara Tamez MA sent at 8/14/2018  4:32 PM CDT -----  Call patient to schedule sleep study f/u

## 2018-08-17 ENCOUNTER — TELEPHONE (OUTPATIENT)
Dept: SLEEP MEDICINE | Facility: CLINIC | Age: 58
End: 2018-08-17

## 2018-08-27 ENCOUNTER — TELEPHONE (OUTPATIENT)
Dept: SLEEP MEDICINE | Facility: CLINIC | Age: 58
End: 2018-08-27

## 2018-08-31 ENCOUNTER — OFFICE VISIT (OUTPATIENT)
Dept: SLEEP MEDICINE | Facility: CLINIC | Age: 58
End: 2018-08-31
Payer: COMMERCIAL

## 2018-08-31 VITALS
HEART RATE: 71 BPM | HEIGHT: 70 IN | WEIGHT: 209 LBS | DIASTOLIC BLOOD PRESSURE: 82 MMHG | OXYGEN SATURATION: 96 % | SYSTOLIC BLOOD PRESSURE: 128 MMHG | BODY MASS INDEX: 29.92 KG/M2

## 2018-08-31 DIAGNOSIS — G47.33 OSA (OBSTRUCTIVE SLEEP APNEA): Primary | ICD-10-CM

## 2018-08-31 PROCEDURE — 3008F BODY MASS INDEX DOCD: CPT | Mod: CPTII,S$GLB,, | Performed by: NURSE PRACTITIONER

## 2018-08-31 PROCEDURE — 99999 PR PBB SHADOW E&M-EST. PATIENT-LVL IV: CPT | Mod: PBBFAC,,, | Performed by: NURSE PRACTITIONER

## 2018-08-31 PROCEDURE — 99214 OFFICE O/P EST MOD 30 MIN: CPT | Mod: S$GLB,,, | Performed by: NURSE PRACTITIONER

## 2018-08-31 NOTE — PATIENT INSTRUCTIONS
Sleep Lab contact number [phone]    [place], [facility] [phone]    [facility] [phone]     [place] office [phone], cell [phone]     CPAP DESENSITIZATION:    Step 1:  Wear the CPAP  at home while awake for 1-2 hour each day.  Practice breathing through the mask while watching television, reading, or performing another sedentary activity that keeps your mind off your anxiety.     Step 2: Use the CPAP   during scheduled one hour naps at home.     Step 3: If can tolerate CPAP for 2 hours daily while awake, start sleeping with it. Use CPAP  during the initial 4.5 hours of nocturnal sleep.     Step 4: Use CPAP  through the entire night of sleep.        Continuous Positive Air Pressure (CPAP)     A mask over the nose gently directs air into the throat to keep the airway open.   Continuous positive air pressure (CPAP) uses gentle air pressure to hold the airway open. CPAP is often the most effective treatment for sleep apnea and severe snoring. It works very well for many people. But keep in mind that it can take several adjustments before the setup is right for you.  How CPAP works  The CPAP machine  is a small portable pump beside the bed. The pump sends air through a hose, which is held over your nose and mouth by a mask. Mild air pressure is gently pushed through your airway. The air pressure nudges sagging tissues aside. This widens the airway so you can breathe better. CPAP may be combined with other kinds of therapy for sleep apnea.  Types of air pressure treatments  There are different types of CPAP. Your doctor or CPAP technician will help you decide which type is best for you:  · Basic CPAP keeps the pressure constant all night long.  · A bilevel device (BiPAP) provides more pressure when you breathe in and less when you breathe out. A BiPAP machine also may be set to provide automatic breaths to  maintain breathing if you stop breathing while sleeping.  · An autoCPAP device automatically adjusts pressure throughout the night and in response to changes such as body position, sleep stage, and snoring.  Date Last Reviewed: 8/10/2015  © 8091-9557 [employer]. [address] [place] [address]. All rights reserved. This information is not intended as a substitute for professional medical care. Always follow your healthcare professional's instructions.        Obstructive Sleep Apnea  Obstructive sleep apnea is a condition that causes your air passages to become narrowed or blocked during sleep. As a result, breathing stops for short periods. Your body wakes up enough for breathing to begin again, though you don't remember it. The cycle of stopped breathing and brief awakenings can repeat dozens of times a night. This prevents the body from getting to the deeper stages of sleep that are needed for good rest and may cause your body's oxygen level to fall.  Signs of sleep apnea include loud snoring, noisy breathing, and gasping sounds during sleep. Daytime symptoms include waking up tired after a full night's sleep, waking up with headaches, feeling very sleepy or falling asleep during the day, and having problems with memory or concentration.  Risk factors for sleep apnea include:  · Being overweight  · Being a man, or a woman in menopause  · Smoking  · Using alcohol or sedating medicines  · Having enlarged structures in the nose or throat  Home care  Lifestyle changes that can help treat snoring and sleep apnea include the following:  · If you are overweight, lose weight. Talk to your healthcare provider about a weight-loss plan for you.  · Avoid alcohol for 3 to 4 hours before bedtime. Avoid sedating medications. Ask your healthcare provider about the medicines you take.  · If you smoke, talk to your healthcare provider about ways to quit.  · Sleep on your side. This can help prevent gravity  from pulling relaxed throat tissues into your breathing passages.  · If you have allergies or sinus problems that block your nose, ask your healthcare provider for help.  Follow-up care  Follow up with your healthcare provider, or as advised. A diagnosis of sleep apnea is made with a sleep study. Your healthcare provider can tell you more about this test.  When to seek medical advice  Sleep apnea can make you more likely to have certain health problems. These include high blood pressure, heart attack, stroke, and sexual dysfunction. If you have sleep apnea, talk to your healthcare provider about the best treatments for you.  Date Last Reviewed: 4/1/2017 © 2000-2017 [employer]. [address] [place] [address], [place] [address]. All rights reserved. This information is not intended as a substitute for professional medical care. Always follow your healthcare professional's instructions.        Continuous Positive Airway Pressure (CPAP)  Your healthcare provider has prescribed continuous positive airway pressure (CPAP) therapy for you. A CPAP device helps you breathe better at night. The device sends air through your nose or mouth when you breathe in to keep your air passages open. CPAP is:  · Used most often to treat sleep apnea and some other problems. (Sleep apnea is a chronic condition with periods of sleep in which you briefly stop breathing.)  · Safe and very effective. But it takes time to get used to the mask.  Your healthcare provider, nurse, or medical supplier will give you tips for wearing and caring for your CPAP device.  General guidelines  Recommendations include the following:  · It's very important not to give up! It takes time to get used to wearing the mask at night.  · Practice using your CPAP device during the day, especially whenever you take a nap.  · Remember, there are several different types of masks. If you cant get used to your mask, ask your provider or medical supply company about  trying another style.  · If you have nasal stuffiness or dryness when using your CPAP device, talk with your provider or medical supply company. There are ways to ease these problems. For example, your provider may recommend using a moistening nasal spray. Or the medical supply company may recommend a device with a humidifier.  · The goal is to use your CPAP all night, every night, during all naps, and even when you travel.  · Keep your mask clean. Wash it with soap and water. Be sure to rinse the mask and tubing well with water to remove any soap. Let them air-dry completely before using.  · Make yourself comfortable when sleeping with CPAP. Try using extra pillows.  Work with your medical supply company so that you know how to correctly use your CPAP. The company's representative will be able to help you:  · Use the CPAP correctly  · Troubleshoot any problems that come up  · Learn to clean and maintain the device  · Adjust to regular use of the CPAP     The CPAP device settings are given as centimeters of water, or cm/H2O. Each persons pressure settings are different. Your healthcare provider will tell you what settings to use. Never change your CPAP pressure setting unless your provider tells you to.  CPAP ____________cm/H20 pressure when you breathe in   Date Last Reviewed: 5/1/2016  © 6775-2131 [employer] [facility]. [address] [place] [address], [place] [address]. All rights reserved. This information is not intended as a substitute for professional medical care. Always follow your healthcare professional's instructions.

## 2018-08-31 NOTE — PROGRESS NOTES
CHIEF COMPLAINT:    Chief Complaint   Patient presents with    Results    Follow-up       HISTORY OF PRESENT ILLNESS: Pierre Grossman is a 58 y.o. male is here for follow up after obtaining home sleep study on 8/6/2018. Patient with loud snoring.  +fatigue upon awake daily.  +witnessed sleep apnea per wife. Comorbidities: HLD, Chronic hip pain that is most comfortable when he sleeps supine.     8/6/2018  lb. The overall AHI was 47 and overall RDI was 58. The oxygen chayo was 77.7% and % time < 90% SpO2 was 5.9%.        PAST MEDICAL HISTORY:    Active Ambulatory Problems     Diagnosis Date Noted    Encounter for screening colonoscopy 05/30/2018    DOMINIK (obstructive sleep apnea)        Past Medical History:   Diagnosis Date    Head injury 1986                PAST SURGICAL HISTORY:    Past Surgical History:   Procedure Laterality Date    CERVICAL FUSION      EYE SURGERY      cataract    HERNIA REPAIR           FAMILY HISTORY:                Family History   Problem Relation Age of Onset    Aneurysm Mother         brain    Pancreatitis Father     Heart attack Neg Hx     Hypertension Neg Hx        SOCIAL HISTORY:          Tobacco:   Social History     Tobacco Use   Smoking Status Never Smoker   Smokeless Tobacco Never Used       alcohol use:    Social History     Substance and Sexual Activity   Alcohol Use No                   ALLERGIES:    Review of patient's allergies indicates:   Allergen Reactions    Iodinated contrast- oral and iv dye Shortness Of Breath and Swelling       CURRENT MEDICATIONS:    Current Outpatient Medications   Medication Sig Dispense Refill    diclofenac (VOLTAREN) 50 MG EC tablet Take 1 tablet (50 mg total) by mouth 2 (two) times daily as needed. 60 tablet 2    meclizine (ANTIVERT) 25 mg tablet Take 1 tablet (25 mg total) by mouth 3 (three) times daily as needed. 30 tablet 2    triamcinolone acetonide 0.1% (KENALOG) 0.1 % cream Apply topically 2 (two) times daily. 453 g 0  "    No current facility-administered medications for this visit.                   REVIEW OF SYSTEMS:     Sleep related symptoms as per HPI.  CONST:Denies weight gain    HEENT: Denies sinus congestion  PULM: Denies dyspnea  CARD:  Denies palpitations   GI:  Denies acid reflux  : Denies polyuria  NEURO: Denies headaches  PSYCH: Denies mood disturbance  HEME: Denies anemia  MS: hip pain   Otherwise, a balance of systems reviewed is negative.          PHYSICAL EXAM:  Vitals:    08/31/18 1429   BP: 128/82   Pulse: 71   SpO2: 96%   Weight: 94.8 kg (208 lb 15.9 oz)   Height: 5' 10" (1.778 m)   PainSc:   8   PainLoc: Hip     Body mass index is 29.99 kg/m².     GENERAL: Normal development, well groomed  HEENT:  Conjunctivae are non-erythematous; Nasal airflow is normal; Posterior pharynx is pink; Modified Mallampati: 4; Posterior palate is normal; Tonsils +1; Uvula is normal and pink;Tongue is normal; Dentition is fair; No TMJ tenderness; Jaw opening and protrusion without click and without discomfort.  NECK: Supple.      RESPIRATORY: Chest is clear to auscultation.  Normal chest expansion and non-labored breathing at rest.  CARDIOVASCULAR: Normal S1, S2.  No murmurs, gallops or rubs.   EXTREMITIES: No edema. No clubbing. No cyanosis. Station normal. Gait normal.        NEURO/PSYCH: Oriented to time, place and person. Normal attention span and concentration. Affect is full. Mood is normal.                                              DATA:  No results found for: TSH     8/6/2018  lb. The overall AHI was 47 and overall RDI was 58. The oxygen chayo was 77.7% and % time < 90% SpO2 was 5.9%.    ASSESSMENT    ICD-10-CM ICD-9-CM    1. DOMINIK (obstructive sleep apnea) G47.33 327.23 CPAP FOR HOME USE       PLAN:    Obstructive Sleep Apnea. Severe by AHI criteria. The patient symptomatically has witnessed apnea, snoring, fatigue with findings of crowded airway.  After reviewing treatment options, the patient agree to treatment " with APAP.      Education: During our discussion today, we talked about the etiology of obstructive sleep apnea as well as the potential ramifications of untreated sleep apnea, which could include daytime sleepiness, hypertension, heart disease and/or stroke. We discussed potential treatment options, which could include weight loss, body positioning, oral appliances (OA), continuous positive airway pressure (CPAP), or referral for surgical consideration.     Behavior modification which includes losing weight, exercising, changing the sleep position, abstaining from alcohol, and avoiding certain medications    Precautions: The patient was advised to abstain from driving should they feel sleepy or drowsy      Follow-up in about 6 weeks (around 10/12/2018).

## 2018-09-07 ENCOUNTER — TELEPHONE (OUTPATIENT)
Dept: ORTHOPEDICS | Facility: CLINIC | Age: 58
End: 2018-09-07

## 2018-09-07 NOTE — TELEPHONE ENCOUNTER
Spoke to Eben and informed her that  was out today and would be back on next week. I explained to her that I would send a message out to  regarding 's sx so that she can give her a call back. Eben verbalized understanding.

## 2018-09-07 NOTE — TELEPHONE ENCOUNTER
----- Message from Angle Lainez sent at 9/7/2018  9:00 AM CDT -----  Contact: Wife-Eben  Wife ask for a call to schedule the patient surgery as soon as possible. Wife ask for a call at

## 2018-09-10 DIAGNOSIS — M25.551 CHRONIC RIGHT HIP PAIN: ICD-10-CM

## 2018-09-10 DIAGNOSIS — G89.29 CHRONIC RIGHT HIP PAIN: ICD-10-CM

## 2018-09-10 RX ORDER — DICLOFENAC SODIUM 50 MG/1
TABLET, DELAYED RELEASE ORAL
Qty: 60 TABLET | Refills: 2 | Status: SHIPPED | OUTPATIENT
Start: 2018-09-10 | End: 2020-03-27 | Stop reason: SDUPTHER

## 2018-09-11 NOTE — TELEPHONE ENCOUNTER
We spoke  [facility] had called them last night  They picked Oct.3rd   For his R  MELISSA    They will do the class Sept. 18 & the clearance Sept. 25   She voiced understanding

## 2018-09-12 DIAGNOSIS — M16.11 PRIMARY OSTEOARTHRITIS OF RIGHT HIP: Primary | ICD-10-CM

## 2018-09-12 DIAGNOSIS — M16.11 OSTEOARTHRITIS OF RIGHT HIP, UNSPECIFIED OSTEOARTHRITIS TYPE: ICD-10-CM

## 2018-09-13 ENCOUNTER — ANESTHESIA EVENT (OUTPATIENT)
Dept: SURGERY | Facility: HOSPITAL | Age: 58
DRG: 470 | End: 2018-09-13
Payer: COMMERCIAL

## 2018-09-13 DIAGNOSIS — Z91.89 AT RISK OF UTI: ICD-10-CM

## 2018-09-13 DIAGNOSIS — M79.669 PAIN OF LOWER LEG, UNSPECIFIED LATERALITY: Primary | ICD-10-CM

## 2018-09-13 NOTE — PRE ADMISSION SCREENING
Anesthesia Assessment: Preoperative EQUATION    Planned Procedure: Procedure(s) (LRB):  REPLACEMENT-HIP-TOTAL WITH NAVIGATION (Right)  Requested Anesthesia Type:Spinal  Surgeon: [facility]. [facility] Jr., MD  Service: Orthopedics  Known or anticipated Date of Surgery:10/3/2018        Plan:    Testing:  Hematology Profile, CBC, PT/INR, T&S and UA   Pre-anesthesia  visit       Visit focus: possible regional anesthesia and/or nerve block      Consultation:Patient's PCP for a statement of optimization      Co-morbidities: cervical fusion    Halie Mckeon RN 09/13/2018
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
## 2018-09-17 ENCOUNTER — TELEPHONE (OUTPATIENT)
Dept: FAMILY MEDICINE | Facility: CLINIC | Age: 58
End: 2018-09-17

## 2018-09-17 NOTE — TELEPHONE ENCOUNTER
----- Message from Miguelangel Blair sent at 9/14/2018  4:51 PM CDT -----  Contact: Eben garnica/[phone]  The patient wife would like to schedule a clearance appointment for the patient. The patient surgery is schedule for 10/3.    Thank you

## 2018-09-18 ENCOUNTER — TELEPHONE (OUTPATIENT)
Dept: ADMINISTRATIVE | Facility: HOSPITAL | Age: 58
End: 2018-09-18

## 2018-09-18 ENCOUNTER — TELEPHONE (OUTPATIENT)
Dept: FAMILY MEDICINE | Facility: CLINIC | Age: 58
End: 2018-09-18

## 2018-09-18 ENCOUNTER — TELEPHONE (OUTPATIENT)
Dept: ORTHOPEDICS | Facility: CLINIC | Age: 58
End: 2018-09-18

## 2018-09-18 ENCOUNTER — PATIENT MESSAGE (OUTPATIENT)
Dept: FAMILY MEDICINE | Facility: CLINIC | Age: 58
End: 2018-09-18

## 2018-09-18 NOTE — TELEPHONE ENCOUNTER
Patient's wife scheduled pre-op appointment for 9/24/2018.      Message sent to patient's [facility] account.

## 2018-09-18 NOTE — TELEPHONE ENCOUNTER
----- Message from Bib Jovel sent at 9/18/2018 12:08 PM CDT -----  Contact: spouse  Needs Advice    Reason for call: Would like Jamila to call her, regarding patient appt today         Communication Preference: [phone]     Additional Information:        No answer  I left a message explaining what the appointments on today were for

## 2018-09-18 NOTE — TELEPHONE ENCOUNTER
----- Message from Sunita Leyva sent at 9/18/2018 12:03 PM CDT -----  Contact: WIFE   Eben  [phone]  Pt is returning your office call. Pls call pt wife  Eben  [phone]. Thanks...............Lisa

## 2018-09-21 DIAGNOSIS — Z11.59 ENCOUNTER FOR HEPATITIS C SCREENING TEST FOR LOW RISK PATIENT: Primary | ICD-10-CM

## 2018-09-24 ENCOUNTER — LAB VISIT (OUTPATIENT)
Dept: LAB | Facility: HOSPITAL | Age: 58
End: 2018-09-24
Attending: FAMILY MEDICINE
Payer: COMMERCIAL

## 2018-09-24 ENCOUNTER — OFFICE VISIT (OUTPATIENT)
Dept: FAMILY MEDICINE | Facility: CLINIC | Age: 58
End: 2018-09-24
Payer: COMMERCIAL

## 2018-09-24 VITALS
BODY MASS INDEX: 29.8 KG/M2 | DIASTOLIC BLOOD PRESSURE: 84 MMHG | HEIGHT: 70 IN | SYSTOLIC BLOOD PRESSURE: 120 MMHG | TEMPERATURE: 98 F | HEART RATE: 92 BPM | WEIGHT: 208.13 LBS | OXYGEN SATURATION: 95 %

## 2018-09-24 DIAGNOSIS — M79.669 PAIN OF LOWER LEG, UNSPECIFIED LATERALITY: ICD-10-CM

## 2018-09-24 DIAGNOSIS — Z01.818 PREOPERATIVE CLEARANCE: Primary | ICD-10-CM

## 2018-09-24 DIAGNOSIS — Z11.59 ENCOUNTER FOR HEPATITIS C SCREENING TEST FOR LOW RISK PATIENT: ICD-10-CM

## 2018-09-24 DIAGNOSIS — R73.01 IMPAIRED FASTING GLUCOSE: ICD-10-CM

## 2018-09-24 DIAGNOSIS — M16.11 OSTEOARTHRITIS OF RIGHT HIP, UNSPECIFIED OSTEOARTHRITIS TYPE: ICD-10-CM

## 2018-09-24 LAB
ABO + RH BLD: NORMAL
ANION GAP SERPL CALC-SCNC: 8 MMOL/L
BLD GP AB SCN CELLS X3 SERPL QL: NORMAL
BUN SERPL-MCNC: 12 MG/DL
CALCIUM SERPL-MCNC: 9.8 MG/DL
CHLORIDE SERPL-SCNC: 104 MMOL/L
CO2 SERPL-SCNC: 24 MMOL/L
CREAT SERPL-MCNC: 1 MG/DL
CRP SERPL-MCNC: 105.5 MG/L
ERYTHROCYTE [DISTWIDTH] IN BLOOD BY AUTOMATED COUNT: 13.4 %
EST. GFR  (AFRICAN AMERICAN): >60 ML/MIN/1.73 M^2
EST. GFR  (NON AFRICAN AMERICAN): >60 ML/MIN/1.73 M^2
GLUCOSE SERPL-MCNC: 101 MG/DL
HCT VFR BLD AUTO: 43.9 %
HGB BLD-MCNC: 14.7 G/DL
INR PPP: 1
MCH RBC QN AUTO: 28.7 PG
MCHC RBC AUTO-ENTMCNC: 33.5 G/DL
MCV RBC AUTO: 86 FL
PLATELET # BLD AUTO: 217 K/UL
PMV BLD AUTO: 10.8 FL
POTASSIUM SERPL-SCNC: 4 MMOL/L
PROTHROMBIN TIME: 10.1 SEC
RBC # BLD AUTO: 5.13 M/UL
SODIUM SERPL-SCNC: 136 MMOL/L
WBC # BLD AUTO: 13.11 K/UL

## 2018-09-24 PROCEDURE — 83036 HEMOGLOBIN GLYCOSYLATED A1C: CPT

## 2018-09-24 PROCEDURE — 86850 RBC ANTIBODY SCREEN: CPT

## 2018-09-24 PROCEDURE — 80048 BASIC METABOLIC PNL TOTAL CA: CPT

## 2018-09-24 PROCEDURE — 99999 PR PBB SHADOW E&M-EST. PATIENT-LVL III: CPT | Mod: PBBFAC,,, | Performed by: FAMILY MEDICINE

## 2018-09-24 PROCEDURE — 99214 OFFICE O/P EST MOD 30 MIN: CPT | Mod: S$GLB,,, | Performed by: FAMILY MEDICINE

## 2018-09-24 PROCEDURE — 3008F BODY MASS INDEX DOCD: CPT | Mod: CPTII,S$GLB,, | Performed by: FAMILY MEDICINE

## 2018-09-24 PROCEDURE — 85027 COMPLETE CBC AUTOMATED: CPT

## 2018-09-24 PROCEDURE — 93005 ELECTROCARDIOGRAM TRACING: CPT | Mod: S$GLB,,, | Performed by: FAMILY MEDICINE

## 2018-09-24 PROCEDURE — 36415 COLL VENOUS BLD VENIPUNCTURE: CPT | Mod: PO

## 2018-09-24 PROCEDURE — 86140 C-REACTIVE PROTEIN: CPT

## 2018-09-24 PROCEDURE — 86803 HEPATITIS C AB TEST: CPT

## 2018-09-24 PROCEDURE — 93010 ELECTROCARDIOGRAM REPORT: CPT | Mod: S$GLB,,, | Performed by: INTERNAL MEDICINE

## 2018-09-24 PROCEDURE — 85610 PROTHROMBIN TIME: CPT
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
## 2018-09-24 NOTE — DISCHARGE INSTRUCTIONS
Your surgery has been scheduled for:__________________________________________    You should report to:  ____[facility], located on the [place] side of the first floor of the           [facility] ([phone])  ____[facility], located on the [facility] side of the            Second floor of the [facility] ([phone])  ____3rd Floor [facility] located on the [facility] side of the [facility] [phone]  Please Note   - Tell your doctor if you take Aspirin, products containing Aspirin, herbal medications  or blood thinners, such as Coumadin, Ticlid, or Plavix.  (Consult your provider regarding holding or stopping before surgery).  - Arrange for someone to drive you home following surgery.  You will not be allowed to leave the surgical facility alone or drive yourself home following sedation and anesthesia.  Before Surgery  - Stop taking all herbal medications 14days prior to surgery  - No Motrin/Advil (Ibuprofen) 7 days before surgery  - No Aleve (Naproxen) 7 days before surgery  - No Goody's/BC powder 7 days before surgery  - Stop Taking Asprin, products containing Asprin _____days before surgery  - Stop taking blood thinners_______days before surgery  - Refrain from drinking alcoholic beverages for 24hours before and after surgery  - Stop or limit smoking _________days before surgery  - You may take Tylenol for pain  Night before Surgery  - Take a shower or bath (shower is recommended).  Bathe with Hibiclens soap or an antibacterial soap from the neck down.  If not supplied by your surgeon, hibiclens soap will need to be purchased over the counter in pharmacy.  Rinse soap off thoroughly.  - Shampoo your hair with your regular shampoo                           Food and Beverage Instructions  1. Stop ALL solid food, gum, candy (including vitamins) 8 hours before surgery/procedure time.  2. The patient should be  ENCOURAGED to drink carbohydrate-rich clear liquids (sports drinks, clear juices) until 2 hours prior to surgery/procedure time.  3. CLEAR liquids include only water, black coffee NO creamer, clear oral rehydration drinks, clear sports drinks or clear fruit juices (no orange juice, no pulpy juices, no apple cider). Advise patients if they can read newsprint through the liquid, it qualifies as clear liquid.   4. IF IN DOUBT, drink water instead.   5. NOTHING  TO DRINK 2 hours before to arrival for surgery/procedure time. If you are told to take medication on the morning of surgery, it may be taken with a sip of water.     The Day of Surgery  - Take another bath or shower with hibiclens or any antibacterial soap, to reduce the chance of infection.  - Take heart and blood pressure medications with a small sip of water, as advised by the perioperative team.  - Do not take fluid pills  - You may brush your teeth and rinse your mouth, but do not swall any additional water.   - Do not apply perfumes, powder, body lotions or deodorant on the day of surgery.  - Nail polish should be removed.  - Do not wear makeup or moisturizer  - Wear comfortable clothes, such as a button front shirt and loose fitting pants.  - Leave all jewelry, including body piercings, and valuables at home.    - Bring any devices you will neeed after surgery such as crutches or canes.  - If you have sleep apnea, please bring your CPAP machine  In the event that your physical condition changes including the onset of a cold or respiratory illness, or if you have to delay or cancel your surgery, please notify your surgeon.    Anesthesia: Regional Anesthesia    Youre scheduled for surgery. During surgery, youll receive medicine called anesthesia to keep you comfortable and pain-free. Your surgeon has decided that youll receive regional anesthesia. This sheet tells you what to expect with this type of anesthesia.  What is regional anesthesia?  Regional  anesthesia numbs one region of your body. The anesthesia may be given around nerves or into veins in your arms, neck, or legs (nerve block or Rod block). Or it may be sent into the spinal fluid (spinal anesthesia) or into the space just outside the spinal fluid (epidural anesthesia). You may also be given sedatives to help you relax.  Nerve block or [place] block  A small area of the body, such as an arm or leg, can be numbed using a nerve block or [place] block.  · Nerve block. During a nerve block, your skin is numbed. A needle is then inserted near nerves that serve the area to be numbed. Anesthetic is sent through the needle.  · IV regional or [place] block. For this type of block, an IV line is put into a vein. The blood flow to the area to be numbed is blocked for a short time. Anesthetic is sent through the IV.  Spinal anesthesia  Spinal anesthesia numbs your body from about the waist down.  · Anesthetic is injected into the spinal fluid. This is a substance that surrounds the spinal cord in your spinal column. The anesthetic blocks pain traveling from the body to the brain.  · To receive the anesthetic, your skin is numbed at the injection site on your back.  · A needle is then inserted into the spinal space. Anesthetic is sent into the spinal fluid through the needle.  Epidural anesthesia  Epidural anesthesia is most commonly used during childbirth and may also be used after surgical procedures of the chest, belly, and legs.  · Anesthetic is injected into the epidural space. This is just outside the dural sac which contains the spinal fluid.  · To receive the anesthetic, your skin is numbed at the injection site on your back.  · A needle is then inserted into the epidural space. Anesthetic is sent into the epidural space through the needle.  · A small flexible catheter may be attached to the needle and left in place. This allows for continuous injections or infusions of anesthetic.  Anesthesia tools and medicines  that might be near you during your procedure  · Local anesthetic. This medicine is given through a needle numbs one region of your body.  · Electrocardiography leads (electrodes). These are used to record your heart rate and rhythm.  · Blood pressure cuff. A cuff is placed on your arm to keep track of your blood pressure.  · Pulse oximeter. This small clip is placed on the end of the finger. It measures your blood oxygen level.  · Sedatives. These medicines may be given through an IV. They help to relax you and keep you comfortable. You may stay awake or sleep lightly.  · Oxygen. You may be given oxygen through a facemask.  Risks and possible complications  Regional anesthesia carries some risks. These include:  · Nausea and vomiting  · Headache  · Backache  · Decreased blood pressure  · Allergic reaction to the anesthetic  · Ongoing numbness (rare)  · Irregular heartbeat (rare)  · Cardiac arrest (rare)   Date Last Reviewed: 12/1/2016  © 8227-9341 [employer]. [address] [place] [address], [place] [id number]. All rights reserved. This information is not intended as a substitute for professional medical care. Always follow your healthcare professional's instructions.

## 2018-09-25 ENCOUNTER — HOSPITAL ENCOUNTER (OUTPATIENT)
Dept: PREADMISSION TESTING | Facility: HOSPITAL | Age: 58
Discharge: HOME | End: 2018-09-25
Attending: ANESTHESIOLOGY
Payer: COMMERCIAL

## 2018-09-25 ENCOUNTER — OFFICE VISIT (OUTPATIENT)
Dept: ORTHOPEDICS | Facility: CLINIC | Age: 58
End: 2018-09-25
Payer: COMMERCIAL

## 2018-09-25 ENCOUNTER — TELEPHONE (OUTPATIENT)
Dept: FAMILY MEDICINE | Facility: CLINIC | Age: 58
End: 2018-09-25

## 2018-09-25 VITALS
HEART RATE: 70 BPM | HEIGHT: 70 IN | WEIGHT: 208.25 LBS | BODY MASS INDEX: 29.81 KG/M2 | SYSTOLIC BLOOD PRESSURE: 141 MMHG | DIASTOLIC BLOOD PRESSURE: 92 MMHG

## 2018-09-25 VITALS
WEIGHT: 208 LBS | OXYGEN SATURATION: 97 % | HEIGHT: 70 IN | TEMPERATURE: 98 F | DIASTOLIC BLOOD PRESSURE: 86 MMHG | HEART RATE: 72 BPM | RESPIRATION RATE: 16 BRPM | SYSTOLIC BLOOD PRESSURE: 140 MMHG | BODY MASS INDEX: 29.78 KG/M2

## 2018-09-25 DIAGNOSIS — R31.9 HEMATURIA, UNSPECIFIED TYPE: Primary | ICD-10-CM

## 2018-09-25 DIAGNOSIS — M16.11 PRIMARY OSTEOARTHRITIS OF RIGHT HIP: Primary | ICD-10-CM

## 2018-09-25 LAB
ESTIMATED AVG GLUCOSE: 91 MG/DL
HBA1C MFR BLD HPLC: 4.8 %
HCV AB SERPL QL IA: NEGATIVE

## 2018-09-25 PROCEDURE — 99999 PR PBB SHADOW E&M-EST. PATIENT-LVL III: CPT | Mod: PBBFAC,,, | Performed by: PHYSICIAN ASSISTANT

## 2018-09-25 PROCEDURE — 99499 UNLISTED E&M SERVICE: CPT | Mod: S$GLB,,, | Performed by: PHYSICIAN ASSISTANT

## 2018-09-25 RX ORDER — ASPIRIN 81 MG/1
81 TABLET ORAL 2 TIMES DAILY
Status: CANCELLED | OUTPATIENT
Start: 2018-09-25

## 2018-09-25 RX ORDER — CELECOXIB 100 MG/1
200 CAPSULE ORAL DAILY
Status: CANCELLED | OUTPATIENT
Start: 2018-09-25

## 2018-09-25 RX ORDER — CELECOXIB 100 MG/1
400 CAPSULE ORAL
Status: CANCELLED | OUTPATIENT
Start: 2018-09-25

## 2018-09-25 RX ORDER — FAMOTIDINE 20 MG/1
20 TABLET, FILM COATED ORAL 2 TIMES DAILY
Status: CANCELLED | OUTPATIENT
Start: 2018-09-25

## 2018-09-25 RX ORDER — IBUPROFEN 200 MG
200 TABLET ORAL EVERY 6 HOURS PRN
COMMUNITY

## 2018-09-25 RX ORDER — OXYCODONE HYDROCHLORIDE 5 MG/1
5 TABLET ORAL
Status: CANCELLED | OUTPATIENT
Start: 2018-09-25

## 2018-09-25 RX ORDER — MORPHINE SULFATE 10 MG/ML
2 INJECTION, SOLUTION INTRAMUSCULAR; INTRAVENOUS
Status: CANCELLED | OUTPATIENT
Start: 2018-09-25

## 2018-09-25 RX ORDER — PREGABALIN 25 MG/1
75 CAPSULE ORAL NIGHTLY
Status: CANCELLED | OUTPATIENT
Start: 2018-09-25

## 2018-09-25 RX ORDER — ACETAMINOPHEN 500 MG
1000 TABLET ORAL EVERY 6 HOURS
Status: CANCELLED | OUTPATIENT
Start: 2018-09-25 | End: 2018-09-27

## 2018-09-25 RX ORDER — ACETAMINOPHEN 10 MG/ML
1000 INJECTION, SOLUTION INTRAVENOUS ONCE
Status: CANCELLED | OUTPATIENT
Start: 2018-09-25 | End: 2018-09-25

## 2018-09-25 RX ORDER — AMOXICILLIN 250 MG
1 CAPSULE ORAL 2 TIMES DAILY
Status: CANCELLED | OUTPATIENT
Start: 2018-09-25

## 2018-09-25 RX ORDER — POLYETHYLENE GLYCOL 3350 17 G/17G
17 POWDER, FOR SOLUTION ORAL DAILY
Status: CANCELLED | OUTPATIENT
Start: 2018-09-25

## 2018-09-25 RX ORDER — PREGABALIN 25 MG/1
150 CAPSULE ORAL
Status: CANCELLED | OUTPATIENT
Start: 2018-09-25

## 2018-09-25 RX ORDER — SODIUM CHLORIDE 9 MG/ML
INJECTION, SOLUTION INTRAVENOUS CONTINUOUS
Status: CANCELLED | OUTPATIENT
Start: 2018-09-25 | End: 2018-09-26

## 2018-09-25 RX ORDER — SODIUM CHLORIDE 9 MG/ML
INJECTION, SOLUTION INTRAVENOUS
Status: CANCELLED | OUTPATIENT
Start: 2018-09-25

## 2018-09-25 RX ORDER — ONDANSETRON 2 MG/ML
4 INJECTION INTRAMUSCULAR; INTRAVENOUS EVERY 8 HOURS PRN
Status: CANCELLED | OUTPATIENT
Start: 2018-09-25

## 2018-09-25 RX ORDER — NALOXONE HCL 0.4 MG/ML
0.02 VIAL (ML) INJECTION
Status: CANCELLED | OUTPATIENT
Start: 2018-09-25 | End: 2018-09-28

## 2018-09-25 RX ORDER — BISACODYL 10 MG
10 SUPPOSITORY, RECTAL RECTAL EVERY 12 HOURS PRN
Status: CANCELLED | OUTPATIENT
Start: 2018-09-25

## 2018-09-25 RX ORDER — TAMSULOSIN HYDROCHLORIDE 0.4 MG/1
0.4 CAPSULE ORAL DAILY
Status: CANCELLED | OUTPATIENT
Start: 2018-09-25 | End: 2018-09-27

## 2018-09-25 RX ORDER — MUPIROCIN 20 MG/G
1 OINTMENT TOPICAL
Status: CANCELLED | OUTPATIENT
Start: 2018-09-25

## 2018-09-25 RX ORDER — RAMELTEON 8 MG/1
8 TABLET ORAL NIGHTLY PRN
Status: CANCELLED | OUTPATIENT
Start: 2018-09-25

## 2018-09-25 RX ORDER — LIDOCAINE HYDROCHLORIDE 10 MG/ML
1 INJECTION, SOLUTION EPIDURAL; INFILTRATION; INTRACAUDAL; PERINEURAL
Status: CANCELLED | OUTPATIENT
Start: 2018-09-25

## 2018-09-25 RX ORDER — MUPIROCIN 20 MG/G
1 OINTMENT TOPICAL 2 TIMES DAILY
Status: CANCELLED | OUTPATIENT
Start: 2018-09-25 | End: 2018-09-30

## 2018-09-25 RX ORDER — MELOXICAM 7.5 MG/1
7.5 TABLET ORAL DAILY
COMMUNITY
End: 2021-10-12

## 2018-09-25 RX ORDER — OXYCODONE HYDROCHLORIDE 5 MG/1
10 TABLET ORAL
Status: CANCELLED | OUTPATIENT
Start: 2018-09-25

## 2018-09-25 NOTE — PROGRESS NOTES
Pierre Grossman is a 58 y.o. year old here today for a pre-operative visit in preparation for a Right total hip arthroplasty to be performed by  Dr. [facility] on 10/3/2018.  he was last seen and treated in the clinic on 6/5/2018. he will be medically optimized by the pre op center. There has been no significant change in medical status since last visit. No fever, chills, malaise, or unexplained weight change.      Allergies, Medications, past medical and surgical history reviewed.    Focused examination performed.    Dr. [facility] saw this patient today in clinic. All questions answered. Patient encouraged to call with questions. Contact information given.     Pre, tiffany, and post operative procedures and expectations discussed. Questions were answered. Pierre Grossman has been educated and is ready to proceed with surgery. Approximately 30 minutes was spent discussing surgical outcomes, plans, procedures pre, tiffany, and post operative expections and care.  Surgical consent signed.    Pierre Grossman will contact us if there are any questions, concerns, or changes in medical status prior to surgery.

## 2018-09-25 NOTE — H&P
CC: Right hip pain    Pierre Grossman is a 58 y.o. male with 2 year history of Right hip pain.  Pain is worse with activity and weight bearing.  Patient has experienced interference of activities of daily living due to increased pain and decreased range of motion. Patient has failed non-operative treatment including NSAIDs, as well as greater than 3 months of activity modification. Pierre Grossman ambulates independently.     Past Medical History:   Diagnosis Date    Head injury 1986       Past Surgical History:   Procedure Laterality Date    CERVICAL FUSION      COLONOSCOPY N/A 5/30/2018    Procedure: COLONOSCOPY;  Surgeon: Zakia Mirza MD;  Location: [facility] ENDO;  Service: Endoscopy;  Laterality: N/A;    COLONOSCOPY N/A 5/30/2018    Performed by Zakia Mirza MD at [facility] ENDO    EYE SURGERY      cataract    HERNIA REPAIR         Family History   Problem Relation Age of Onset    Aneurysm Mother         brain    Pancreatitis Father     Heart attack Neg Hx     Hypertension Neg Hx        Review of patient's allergies indicates:   Allergen Reactions    Iodinated contrast- oral and iv dye Shortness Of Breath and Swelling         Current Outpatient Medications:     aspirin/caffeine (BC ARTHRITIS ORAL), Take 1 tablet by mouth as needed., Disp: , Rfl:     diclofenac (VOLTAREN) 50 MG EC tablet, TAKE ONE TABLET BY MOUTH TWICE DAILY AS NEEDED FOR INFLAMMATION and/or FOR PAIN., Disp: 60 tablet, Rfl: 2    ibuprofen (ADVIL,MOTRIN) 200 MG tablet, Take 200 mg by mouth every 6 (six) hours as needed for Pain., Disp: , Rfl:     meclizine (ANTIVERT) 25 mg tablet, Take 1 tablet (25 mg total) by mouth 3 (three) times daily as needed., Disp: 30 tablet, Rfl: 2    meloxicam (MOBIC) 7.5 MG tablet, Take 7.5 mg by mouth once daily., Disp: , Rfl:     triamcinolone acetonide 0.1% (KENALOG) 0.1 % cream, Apply topically 2 (two) times daily., Disp: 453 g, Rfl: 0    Review of Systems:   Constitutional: no fever or  "chills  Eyes: no visual changes  ENT: no nasal congestion or sore throat  Respiratory: no cough or shortness of breath  Cardiovascular: no chest pain or palpitations  Gastrointestinal: no nausea or vomiting, tolerating diet  Genitourinary: no hematuria or dysuria  Integument/Breast: no rash or pruritis  Hematologic/Lymphatic: no easy bruising or lymphadenopathy  Musculoskeletal: positive for hip pain  Neurological: no seizures or tremors  Behavioral/Psych: no auditory or visual hallucinations  Endocrine: no heat or cold intolerance    PE:  BP (!) 141/92   Pulse 70   Ht 5' 10" (1.778 m)   Wt 94.4 kg (208 lb 3.6 oz)   BMI 29.88 kg/m²   General: Pleasant, cooperative, NAD   Gait: antalgic  HEENT: NCAT, sclera nonicteric   Lungs: Respirations are clear, equal and unlabored.   CV: S1S2; 2+ bilateral upper and lower extremity pulses.   Skin: Intact throughout LE with no rashes, erythema, or lesions  Extremities: No LE edema, NVI lower extremities    Right Hip Exam:  90 degrees flexion  0 degrees extension   10 degrees internal rotation  20 degrees external rotation  15 degrees abduction  5 degrees adduction  There is pain with passive range of motion.     Radiographs: Radiographs reveal advanced degenerative changes including subchondral cyst formation, subchondral sclerosis, osteophyte formation, joint space narrowing.     Diagnosis: osteoarthritis Right hip    Plan: Right total hip arthroplasty     Due to the serious nature of total joint infection and the high prevalence of community acquired MRSA, vancomycin will be used perioperatively.              "

## 2018-09-25 NOTE — TELEPHONE ENCOUNTER
----- Message from Halie Mckeon RN sent at 9/25/2018  9:28 AM CDT -----  Patient had UA done yesterday(ordered by anesthesia).  Anesthesia would like you to review and indicate if any further workup is required.    Halie Mckeon RN  [facility]  [id number]

## 2018-09-25 NOTE — DISCHARGE INSTRUCTIONS
Your surgery has been scheduled for:__________________________________________    You should report to:  ____[facility], located on the [place] side of the first floor of the           [facility] ([phone])  ____[facility], located on the [facility] side of the            Second floor of the [facility] [phone])  ____3rd Floor [facility] located on the [facility] side of the [facility] [phone]  Please Note   - Tell your doctor if you take Aspirin, products containing Aspirin, herbal medications  or blood thinners, such as Coumadin, Ticlid, or Plavix.  (Consult your provider regarding holding or stopping before surgery).  - Arrange for someone to drive you home following surgery.  You will not be allowed to leave the surgical facility alone or drive yourself home following sedation and anesthesia.  Before Surgery  - Stop taking all herbal medications 14days prior to surgery  - No Motrin/Advil (Ibuprofen) 7 days before surgery  - No Aleve (Naproxen) 7 days before surgery  - No Goody's/BC powder 7 days before surgery  - Stop Taking Asprin, products containing Asprin _____days before surgery  - Stop taking blood thinners_______days before surgery  - Refrain from drinking alcoholic beverages for 24hours before and after surgery  - Stop or limit smoking _________days before surgery  - You may take Tylenol for pain  Night before Surgery  - Take a shower or bath (shower is recommended).  Bathe with Hibiclens soap or an antibacterial soap from the neck down.  If not supplied by your surgeon, hibiclens soap will need to be purchased over the counter in pharmacy.  Rinse soap off thoroughly.  - Shampoo your hair with your regular shampoo                           Food and Beverage Instructions  1. Stop ALL solid food, gum, candy (including vitamins) 8 hours before surgery/procedure time.  2. The patient should be  ENCOURAGED to drink carbohydrate-rich clear liquids (sports drinks, clear juices) until 2 hours prior to surgery/procedure time.  3. CLEAR liquids include only water, black coffee NO creamer, clear oral rehydration drinks, clear sports drinks or clear fruit juices (no orange juice, no pulpy juices, no apple cider). Advise patients if they can read newsprint through the liquid, it qualifies as clear liquid.   4. IF IN DOUBT, drink water instead.   5. NOTHING  TO DRINK 2 hours before to arrival for surgery/procedure time. If you are told to take medication on the morning of surgery, it may be taken with a sip of water.     The Day of Surgery  - Take another bath or shower with hibiclens or any antibacterial soap, to reduce the chance of infection.  - Take heart and blood pressure medications with a small sip of water, as advised by the perioperative team.  - Do not take fluid pills  - You may brush your teeth and rinse your mouth, but do not swall any additional water.   - Do not apply perfumes, powder, body lotions or deodorant on the day of surgery.  - Nail polish should be removed.  - Do not wear makeup or moisturizer  - Wear comfortable clothes, such as a button front shirt and loose fitting pants.  - Leave all jewelry, including body piercings, and valuables at home.    - Bring any devices you will neeed after surgery such as crutches or canes.  - If you have sleep apnea, please bring your CPAP machine  In the event that your physical condition changes including the onset of a cold or respiratory illness, or if you have to delay or cancel your surgery, please notify your surgeon.    Anesthesia: Regional Anesthesia    Youre scheduled for surgery. During surgery, youll receive medicine called anesthesia to keep you comfortable and pain-free. Your surgeon has decided that youll receive regional anesthesia. This sheet tells you what to expect with this type of anesthesia.  What is regional anesthesia?  Regional  anesthesia numbs one region of your body. The anesthesia may be given around nerves or into veins in your arms, neck, or legs (nerve block or Rod block). Or it may be sent into the spinal fluid (spinal anesthesia) or into the space just outside the spinal fluid (epidural anesthesia). You may also be given sedatives to help you relax.  Nerve block or Woodway block  A small area of the body, such as an arm or leg, can be numbed using a nerve block or Woodway block.  · Nerve block. During a nerve block, your skin is numbed. A needle is then inserted near nerves that serve the area to be numbed. Anesthetic is sent through the needle.  · IV regional or Woodway block. For this type of block, an IV line is put into a vein. The blood flow to the area to be numbed is blocked for a short time. Anesthetic is sent through the IV.  Spinal anesthesia  Spinal anesthesia numbs your body from about the waist down.  · Anesthetic is injected into the spinal fluid. This is a substance that surrounds the spinal cord in your spinal column. The anesthetic blocks pain traveling from the body to the brain.  · To receive the anesthetic, your skin is numbed at the injection site on your back.  · A needle is then inserted into the spinal space. Anesthetic is sent into the spinal fluid through the needle.  Epidural anesthesia  Epidural anesthesia is most commonly used during childbirth and may also be used after surgical procedures of the chest, belly, and legs.  · Anesthetic is injected into the epidural space. This is just outside the dural sac which contains the spinal fluid.  · To receive the anesthetic, your skin is numbed at the injection site on your back.  · A needle is then inserted into the epidural space. Anesthetic is sent into the epidural space through the needle.  · A small flexible catheter may be attached to the needle and left in place. This allows for continuous injections or infusions of anesthetic.  Anesthesia tools and medicines  that might be near you during your procedure  · Local anesthetic. This medicine is given through a needle numbs one region of your body.  · Electrocardiography leads (electrodes). These are used to record your heart rate and rhythm.  · Blood pressure cuff. A cuff is placed on your arm to keep track of your blood pressure.  · Pulse oximeter. This small clip is placed on the end of the finger. It measures your blood oxygen level.  · Sedatives. These medicines may be given through an IV. They help to relax you and keep you comfortable. You may stay awake or sleep lightly.  · Oxygen. You may be given oxygen through a facemask.  Risks and possible complications  Regional anesthesia carries some risks. These include:  · Nausea and vomiting  · Headache  · Backache  · Decreased blood pressure  · Allergic reaction to the anesthetic  · Ongoing numbness (rare)  · Irregular heartbeat (rare)  · Cardiac arrest (rare)   Date Last Reviewed: 12/1/2016  © 5190-3277 [employer]. [address] [place] [address], [place] [id number]. All rights reserved. This information is not intended as a substitute for professional medical care. Always follow your healthcare professional's instructions.

## 2018-09-25 NOTE — H&P (VIEW-ONLY)
CC: Right hip pain    Pierre Grossman is a 58 y.o. male with 2 year history of Right hip pain.  Pain is worse with activity and weight bearing.  Patient has experienced interference of activities of daily living due to increased pain and decreased range of motion. Patient has failed non-operative treatment including NSAIDs, as well as greater than 3 months of activity modification. Pierre Grossman ambulates independently.     Past Medical History:   Diagnosis Date    Head injury 1986       Past Surgical History:   Procedure Laterality Date    CERVICAL FUSION      COLONOSCOPY N/A 5/30/2018    Procedure: COLONOSCOPY;  Surgeon: Zakia Mirza MD;  Location: [facility] ENDO;  Service: Endoscopy;  Laterality: N/A;    COLONOSCOPY N/A 5/30/2018    Performed by Zakia Mirza MD at [facility] ENDO    EYE SURGERY      cataract    HERNIA REPAIR         Family History   Problem Relation Age of Onset    Aneurysm Mother         brain    Pancreatitis Father     Heart attack Neg Hx     Hypertension Neg Hx        Review of patient's allergies indicates:   Allergen Reactions    Iodinated contrast- oral and iv dye Shortness Of Breath and Swelling         Current Outpatient Medications:     aspirin/caffeine (BC ARTHRITIS ORAL), Take 1 tablet by mouth as needed., Disp: , Rfl:     diclofenac (VOLTAREN) 50 MG EC tablet, TAKE ONE TABLET BY MOUTH TWICE DAILY AS NEEDED FOR INFLAMMATION and/or FOR PAIN., Disp: 60 tablet, Rfl: 2    ibuprofen (ADVIL,MOTRIN) 200 MG tablet, Take 200 mg by mouth every 6 (six) hours as needed for Pain., Disp: , Rfl:     meclizine (ANTIVERT) 25 mg tablet, Take 1 tablet (25 mg total) by mouth 3 (three) times daily as needed., Disp: 30 tablet, Rfl: 2    meloxicam (MOBIC) 7.5 MG tablet, Take 7.5 mg by mouth once daily., Disp: , Rfl:     triamcinolone acetonide 0.1% (KENALOG) 0.1 % cream, Apply topically 2 (two) times daily., Disp: 453 g, Rfl: 0    Review of Systems:   Constitutional: no fever or  "chills  Eyes: no visual changes  ENT: no nasal congestion or sore throat  Respiratory: no cough or shortness of breath  Cardiovascular: no chest pain or palpitations  Gastrointestinal: no nausea or vomiting, tolerating diet  Genitourinary: no hematuria or dysuria  Integument/Breast: no rash or pruritis  Hematologic/Lymphatic: no easy bruising or lymphadenopathy  Musculoskeletal: positive for hip pain  Neurological: no seizures or tremors  Behavioral/Psych: no auditory or visual hallucinations  Endocrine: no heat or cold intolerance    PE:  BP (!) 141/92   Pulse 70   Ht 5' 10" (1.778 m)   Wt 94.4 kg (208 lb 3.6 oz)   BMI 29.88 kg/m²   General: Pleasant, cooperative, NAD   Gait: antalgic  HEENT: NCAT, sclera nonicteric   Lungs: Respirations are clear, equal and unlabored.   CV: S1S2; 2+ bilateral upper and lower extremity pulses.   Skin: Intact throughout LE with no rashes, erythema, or lesions  Extremities: No LE edema, NVI lower extremities    Right Hip Exam:  90 degrees flexion  0 degrees extension   10 degrees internal rotation  20 degrees external rotation  15 degrees abduction  5 degrees adduction  There is pain with passive range of motion.     Radiographs: Radiographs reveal advanced degenerative changes including subchondral cyst formation, subchondral sclerosis, osteophyte formation, joint space narrowing.     Diagnosis: osteoarthritis Right hip    Plan: Right total hip arthroplasty     Due to the serious nature of total joint infection and the high prevalence of community acquired MRSA, vancomycin will be used perioperatively.              "

## 2018-09-28 ENCOUNTER — HOSPITAL ENCOUNTER (OUTPATIENT)
Dept: RADIOLOGY | Facility: HOSPITAL | Age: 58
Discharge: HOME | End: 2018-09-28
Attending: FAMILY MEDICINE
Payer: COMMERCIAL

## 2018-09-28 ENCOUNTER — TELEPHONE (OUTPATIENT)
Dept: FAMILY MEDICINE | Facility: CLINIC | Age: 58
End: 2018-09-28

## 2018-09-28 DIAGNOSIS — R31.9 HEMATURIA, UNSPECIFIED TYPE: ICD-10-CM

## 2018-09-28 PROCEDURE — 76770 US EXAM ABDO BACK WALL COMP: CPT | Mod: TC

## 2018-09-28 PROCEDURE — 76770 US EXAM ABDO BACK WALL COMP: CPT | Mod: 26,,, | Performed by: RADIOLOGY

## 2018-10-01 ENCOUNTER — TELEPHONE (OUTPATIENT)
Dept: ORTHOPEDICS | Facility: CLINIC | Age: 58
End: 2018-10-01

## 2018-10-01 ENCOUNTER — TELEPHONE (OUTPATIENT)
Dept: FAMILY MEDICINE | Facility: CLINIC | Age: 58
End: 2018-10-01

## 2018-10-01 DIAGNOSIS — M16.11 PRIMARY OSTEOARTHRITIS OF RIGHT HIP: Primary | ICD-10-CM

## 2018-10-01 NOTE — TELEPHONE ENCOUNTER
----- Message from Halie Mckeon RN sent at 10/1/2018  3:05 PM CDT -----  I added the UA and cx today.  Patient had a repeat lab ordered by the surgeon's office.  WBC are wnl. US done.  Is patient cleared for surgery?    Halie  ----- Message -----  From: Halie Mckeon RN  Sent: 9/28/2018   8:56 AM  To: Fito ZAFAR Staff    I see a UA and cx ordered and an ekg.  Do you want patient to get that done today before the US at 4:15.    Halie  ----- Message -----  From: Halie Mckeon RN  Sent: 9/25/2018   9:28 AM  To: Fito ZAFAR Staff    Patient had UA done yesterday(ordered by anesthesia).  Anesthesia would like you to review and indicate if any further workup is required.    Halie Mckeon RN  [place] [address]

## 2018-10-02 ENCOUNTER — TELEPHONE (OUTPATIENT)
Dept: ORTHOPEDICS | Facility: CLINIC | Age: 58
End: 2018-10-02

## 2018-10-02 DIAGNOSIS — E11.8 DM TYPE 2, CONTROLLED, WITH COMPLICATION: Primary | ICD-10-CM

## 2018-10-02 PROBLEM — Z12.11 ENCOUNTER FOR SCREENING COLONOSCOPY: Status: RESOLVED | Noted: 2018-05-30 | Resolved: 2018-10-02

## 2018-10-02 NOTE — TELEPHONE ENCOUNTER
we spoke : Reminded to eat light the night before surgery, NPO after midnight, take hibclens shower the night before surgery  & again in the am , sleep on clean sheets  in clean clothes, no laxatives or stool softeners , report to the 2nd floor surgery unit for 6 am tomorrow   He voiced understanding

## 2018-10-03 ENCOUNTER — ANESTHESIA (OUTPATIENT)
Dept: SURGERY | Facility: HOSPITAL | Age: 58
DRG: 470 | End: 2018-10-03
Payer: COMMERCIAL

## 2018-10-03 ENCOUNTER — HOSPITAL ENCOUNTER (INPATIENT)
Facility: HOSPITAL | Age: 58
LOS: 1 days | Discharge: HOME HEALTH | DRG: 470 | End: 2018-10-04
Attending: ORTHOPAEDIC SURGERY | Admitting: ORTHOPAEDIC SURGERY
Payer: COMMERCIAL

## 2018-10-03 ENCOUNTER — TELEPHONE (OUTPATIENT)
Dept: PHARMACY | Facility: CLINIC | Age: 58
End: 2018-10-03

## 2018-10-03 ENCOUNTER — ANESTHESIA EVENT (OUTPATIENT)
Dept: SURGERY | Facility: HOSPITAL | Age: 58
DRG: 470 | End: 2018-10-03
Payer: COMMERCIAL

## 2018-10-03 DIAGNOSIS — M16.11 PRIMARY OSTEOARTHRITIS OF RIGHT HIP: ICD-10-CM

## 2018-10-03 LAB
ANION GAP SERPL CALC-SCNC: 9 MMOL/L
BASOPHILS # BLD AUTO: 0.06 K/UL
BASOPHILS NFR BLD: 0.6 %
BUN SERPL-MCNC: 12 MG/DL
CALCIUM SERPL-MCNC: 8.4 MG/DL
CHLORIDE SERPL-SCNC: 108 MMOL/L
CO2 SERPL-SCNC: 22 MMOL/L
CREAT SERPL-MCNC: 0.9 MG/DL
DIFFERENTIAL METHOD: ABNORMAL
EOSINOPHIL # BLD AUTO: 0.3 K/UL
EOSINOPHIL NFR BLD: 2.7 %
ERYTHROCYTE [DISTWIDTH] IN BLOOD BY AUTOMATED COUNT: 12.5 %
EST. GFR  (AFRICAN AMERICAN): >60 ML/MIN/1.73 M^2
EST. GFR  (NON AFRICAN AMERICAN): >60 ML/MIN/1.73 M^2
GLUCOSE SERPL-MCNC: 107 MG/DL
HCT VFR BLD AUTO: 42 %
HGB BLD-MCNC: 13.6 G/DL
IMM GRANULOCYTES # BLD AUTO: 0.14 K/UL
IMM GRANULOCYTES NFR BLD AUTO: 1.4 %
LYMPHOCYTES # BLD AUTO: 1.7 K/UL
LYMPHOCYTES NFR BLD: 16.8 %
MCH RBC QN AUTO: 28.3 PG
MCHC RBC AUTO-ENTMCNC: 32.4 G/DL
MCV RBC AUTO: 88 FL
MONOCYTES # BLD AUTO: 0.4 K/UL
MONOCYTES NFR BLD: 4.3 %
NEUTROPHILS # BLD AUTO: 7.4 K/UL
NEUTROPHILS NFR BLD: 74.2 %
NRBC BLD-RTO: 0 /100 WBC
PLATELET # BLD AUTO: 203 K/UL
PMV BLD AUTO: 10.8 FL
POTASSIUM SERPL-SCNC: 5.2 MMOL/L
RBC # BLD AUTO: 4.8 M/UL
SODIUM SERPL-SCNC: 139 MMOL/L
WBC # BLD AUTO: 9.92 K/UL

## 2018-10-03 PROCEDURE — 63600175 PHARM REV CODE 636 W HCPCS: Performed by: PHYSICIAN ASSISTANT

## 2018-10-03 PROCEDURE — 71000033 HC RECOVERY, INTIAL HOUR: Performed by: ORTHOPAEDIC SURGERY

## 2018-10-03 PROCEDURE — 88311 DECALCIFY TISSUE: CPT | Mod: 26,,, | Performed by: PATHOLOGY

## 2018-10-03 PROCEDURE — C1713 ANCHOR/SCREW BN/BN,TIS/BN: HCPCS | Performed by: ORTHOPAEDIC SURGERY

## 2018-10-03 PROCEDURE — 27130 TOTAL HIP ARTHROPLASTY: CPT | Mod: AS,RT,, | Performed by: PHYSICIAN ASSISTANT

## 2018-10-03 PROCEDURE — 8E0YXBZ COMPUTER ASSISTED PROCEDURE OF LOWER EXTREMITY: ICD-10-PCS | Performed by: ORTHOPAEDIC SURGERY

## 2018-10-03 PROCEDURE — 63600175 PHARM REV CODE 636 W HCPCS

## 2018-10-03 PROCEDURE — 97116 GAIT TRAINING THERAPY: CPT

## 2018-10-03 PROCEDURE — 25000003 PHARM REV CODE 250: Performed by: PHYSICIAN ASSISTANT

## 2018-10-03 PROCEDURE — 25000003 PHARM REV CODE 250: Performed by: STUDENT IN AN ORGANIZED HEALTH CARE EDUCATION/TRAINING PROGRAM

## 2018-10-03 PROCEDURE — 63600175 PHARM REV CODE 636 W HCPCS: Performed by: STUDENT IN AN ORGANIZED HEALTH CARE EDUCATION/TRAINING PROGRAM

## 2018-10-03 PROCEDURE — 0SR90JA REPLACEMENT OF RIGHT HIP JOINT WITH SYNTHETIC SUBSTITUTE, UNCEMENTED, OPEN APPROACH: ICD-10-PCS | Performed by: ORTHOPAEDIC SURGERY

## 2018-10-03 PROCEDURE — D9220A PRA ANESTHESIA: Mod: ,,, | Performed by: ANESTHESIOLOGY

## 2018-10-03 PROCEDURE — 12000002 HC ACUTE/MED SURGE SEMI-PRIVATE ROOM

## 2018-10-03 PROCEDURE — 27000221 HC OXYGEN, UP TO 24 HOURS

## 2018-10-03 PROCEDURE — 85025 COMPLETE CBC W/AUTO DIFF WBC: CPT

## 2018-10-03 PROCEDURE — 20985 CPTR-ASST DIR MS PX: CPT | Mod: ,,, | Performed by: ORTHOPAEDIC SURGERY

## 2018-10-03 PROCEDURE — 97165 OT EVAL LOW COMPLEX 30 MIN: CPT

## 2018-10-03 PROCEDURE — 37000009 HC ANESTHESIA EA ADD 15 MINS: Performed by: ORTHOPAEDIC SURGERY

## 2018-10-03 PROCEDURE — 97530 THERAPEUTIC ACTIVITIES: CPT

## 2018-10-03 PROCEDURE — 94761 N-INVAS EAR/PLS OXIMETRY MLT: CPT

## 2018-10-03 PROCEDURE — 25000003 PHARM REV CODE 250: Performed by: ORTHOPAEDIC SURGERY

## 2018-10-03 PROCEDURE — 37000008 HC ANESTHESIA 1ST 15 MINUTES: Performed by: ORTHOPAEDIC SURGERY

## 2018-10-03 PROCEDURE — 88311 DECALCIFY TISSUE: CPT | Performed by: PATHOLOGY

## 2018-10-03 PROCEDURE — 97161 PT EVAL LOW COMPLEX 20 MIN: CPT

## 2018-10-03 PROCEDURE — 71000039 HC RECOVERY, EACH ADD'L HOUR: Performed by: ORTHOPAEDIC SURGERY

## 2018-10-03 PROCEDURE — 80048 BASIC METABOLIC PNL TOTAL CA: CPT

## 2018-10-03 PROCEDURE — 27130 TOTAL HIP ARTHROPLASTY: CPT | Mod: RT,,, | Performed by: ORTHOPAEDIC SURGERY

## 2018-10-03 PROCEDURE — C1776 JOINT DEVICE (IMPLANTABLE): HCPCS | Performed by: ORTHOPAEDIC SURGERY

## 2018-10-03 PROCEDURE — 36000711: Performed by: ORTHOPAEDIC SURGERY

## 2018-10-03 PROCEDURE — 88304 TISSUE EXAM BY PATHOLOGIST: CPT | Mod: 26,,, | Performed by: PATHOLOGY

## 2018-10-03 PROCEDURE — 27201423 OPTIME MED/SURG SUP & DEVICES STERILE SUPPLY: Performed by: ORTHOPAEDIC SURGERY

## 2018-10-03 PROCEDURE — 36000710: Performed by: ORTHOPAEDIC SURGERY

## 2018-10-03 PROCEDURE — 94799 UNLISTED PULMONARY SVC/PX: CPT

## 2018-10-03 PROCEDURE — 63600175 PHARM REV CODE 636 W HCPCS: Performed by: ORTHOPAEDIC SURGERY

## 2018-10-03 DEVICE — STEM FEM PRIMARY HIP SYSTEM: Type: IMPLANTABLE DEVICE | Site: HIP | Status: FUNCTIONAL

## 2018-10-03 DEVICE — LINER CONTINUUM VIVACIT-E40X56: Type: IMPLANTABLE DEVICE | Site: HIP | Status: FUNCTIONAL

## 2018-10-03 DEVICE — HEAD BIOLOX DELTA FEM40MM-3.5: Type: IMPLANTABLE DEVICE | Site: HIP | Status: FUNCTIONAL

## 2018-10-03 DEVICE — SHELL CONTINUUM MULTI 56: Type: IMPLANTABLE DEVICE | Site: HIP | Status: FUNCTIONAL

## 2018-10-03 RX ORDER — PREGABALIN 75 MG/1
75 CAPSULE ORAL NIGHTLY
Status: DISCONTINUED | OUTPATIENT
Start: 2018-10-03 | End: 2018-10-04 | Stop reason: HOSPADM

## 2018-10-03 RX ORDER — FENTANYL CITRATE 50 UG/ML
INJECTION, SOLUTION INTRAMUSCULAR; INTRAVENOUS
Status: DISCONTINUED | OUTPATIENT
Start: 2018-10-03 | End: 2018-10-03

## 2018-10-03 RX ORDER — DEXAMETHASONE SODIUM PHOSPHATE 4 MG/ML
INJECTION, SOLUTION INTRA-ARTICULAR; INTRALESIONAL; INTRAMUSCULAR; INTRAVENOUS; SOFT TISSUE
Status: DISCONTINUED | OUTPATIENT
Start: 2018-10-03 | End: 2018-10-03

## 2018-10-03 RX ORDER — TAMSULOSIN HYDROCHLORIDE 0.4 MG/1
0.4 CAPSULE ORAL DAILY
Status: COMPLETED | OUTPATIENT
Start: 2018-10-03 | End: 2018-10-04

## 2018-10-03 RX ORDER — PHENYLEPHRINE HYDROCHLORIDE 10 MG/ML
INJECTION INTRAVENOUS
Status: DISCONTINUED | OUTPATIENT
Start: 2018-10-03 | End: 2018-10-03

## 2018-10-03 RX ORDER — ONDANSETRON 2 MG/ML
4 INJECTION INTRAMUSCULAR; INTRAVENOUS EVERY 8 HOURS PRN
Status: DISCONTINUED | OUTPATIENT
Start: 2018-10-03 | End: 2018-10-04 | Stop reason: HOSPADM

## 2018-10-03 RX ORDER — GENTAMICIN SULFATE 40 MG/ML
INJECTION, SOLUTION INTRAMUSCULAR; INTRAVENOUS
Status: DISCONTINUED | OUTPATIENT
Start: 2018-10-03 | End: 2018-10-03 | Stop reason: HOSPADM

## 2018-10-03 RX ORDER — KETAMINE HYDROCHLORIDE 10 MG/ML
INJECTION, SOLUTION INTRAMUSCULAR; INTRAVENOUS
Status: DISCONTINUED | OUTPATIENT
Start: 2018-10-03 | End: 2018-10-03

## 2018-10-03 RX ORDER — PROPOFOL 10 MG/ML
VIAL (ML) INTRAVENOUS CONTINUOUS PRN
Status: DISCONTINUED | OUTPATIENT
Start: 2018-10-03 | End: 2018-10-03

## 2018-10-03 RX ORDER — ACETAMINOPHEN 10 MG/ML
1000 INJECTION, SOLUTION INTRAVENOUS ONCE
Status: COMPLETED | OUTPATIENT
Start: 2018-10-03 | End: 2018-10-03

## 2018-10-03 RX ORDER — LIDOCAINE HYDROCHLORIDE AND EPINEPHRINE 15; 5 MG/ML; UG/ML
INJECTION, SOLUTION EPIDURAL
Status: DISCONTINUED | OUTPATIENT
Start: 2018-10-03 | End: 2018-10-03

## 2018-10-03 RX ORDER — TRANEXAMIC ACID 100 MG/ML
INJECTION, SOLUTION INTRAVENOUS
Status: DISCONTINUED | OUTPATIENT
Start: 2018-10-03 | End: 2018-10-03 | Stop reason: HOSPADM

## 2018-10-03 RX ORDER — ASPIRIN 81 MG/1
81 TABLET ORAL 2 TIMES DAILY
Qty: 60 TABLET | Refills: 0 | Status: SHIPPED | OUTPATIENT
Start: 2018-10-03 | End: 2023-02-17

## 2018-10-03 RX ORDER — LIDOCAINE HYDROCHLORIDE 10 MG/ML
1 INJECTION, SOLUTION EPIDURAL; INFILTRATION; INTRACAUDAL; PERINEURAL
Status: COMPLETED | OUTPATIENT
Start: 2018-10-03 | End: 2018-10-03

## 2018-10-03 RX ORDER — EPHEDRINE SULFATE 50 MG/ML
INJECTION, SOLUTION INTRAVENOUS
Status: DISCONTINUED | OUTPATIENT
Start: 2018-10-03 | End: 2018-10-03

## 2018-10-03 RX ORDER — OXYCODONE HYDROCHLORIDE 10 MG/1
10 TABLET ORAL
Status: DISCONTINUED | OUTPATIENT
Start: 2018-10-03 | End: 2018-10-04 | Stop reason: HOSPADM

## 2018-10-03 RX ORDER — VANCOMYCIN HYDROCHLORIDE 500 MG/10ML
INJECTION, POWDER, LYOPHILIZED, FOR SOLUTION INTRAVENOUS
Status: DISCONTINUED | OUTPATIENT
Start: 2018-10-03 | End: 2018-10-03 | Stop reason: HOSPADM

## 2018-10-03 RX ORDER — NALOXONE HCL 0.4 MG/ML
0.02 VIAL (ML) INJECTION
Status: DISCONTINUED | OUTPATIENT
Start: 2018-10-03 | End: 2018-10-04 | Stop reason: HOSPADM

## 2018-10-03 RX ORDER — POLYETHYLENE GLYCOL 3350 17 G/17G
17 POWDER, FOR SOLUTION ORAL DAILY
Status: DISCONTINUED | OUTPATIENT
Start: 2018-10-04 | End: 2018-10-04 | Stop reason: HOSPADM

## 2018-10-03 RX ORDER — ONDANSETRON 8 MG/1
8 TABLET, ORALLY DISINTEGRATING ORAL EVERY 12 HOURS PRN
Qty: 20 TABLET | Refills: 0 | Status: SHIPPED | OUTPATIENT
Start: 2018-10-03 | End: 2021-10-12

## 2018-10-03 RX ORDER — PREGABALIN 150 MG/1
150 CAPSULE ORAL
Status: COMPLETED | OUTPATIENT
Start: 2018-10-03 | End: 2018-10-03

## 2018-10-03 RX ORDER — PROPOFOL 10 MG/ML
VIAL (ML) INTRAVENOUS
Status: DISCONTINUED | OUTPATIENT
Start: 2018-10-03 | End: 2018-10-03

## 2018-10-03 RX ORDER — ASPIRIN 81 MG/1
81 TABLET ORAL 2 TIMES DAILY
Status: DISCONTINUED | OUTPATIENT
Start: 2018-10-03 | End: 2018-10-04 | Stop reason: HOSPADM

## 2018-10-03 RX ORDER — ACETAMINOPHEN 500 MG
1000 TABLET ORAL EVERY 6 HOURS
Status: DISCONTINUED | OUTPATIENT
Start: 2018-10-03 | End: 2018-10-04 | Stop reason: HOSPADM

## 2018-10-03 RX ORDER — FAMOTIDINE 20 MG/1
20 TABLET, FILM COATED ORAL 2 TIMES DAILY
Status: DISCONTINUED | OUTPATIENT
Start: 2018-10-03 | End: 2018-10-04 | Stop reason: HOSPADM

## 2018-10-03 RX ORDER — SODIUM CHLORIDE 9 MG/ML
INJECTION, SOLUTION INTRAVENOUS CONTINUOUS
Status: ACTIVE | OUTPATIENT
Start: 2018-10-03 | End: 2018-10-04

## 2018-10-03 RX ORDER — MECLIZINE HCL 12.5 MG 12.5 MG/1
25 TABLET ORAL 3 TIMES DAILY PRN
Status: DISCONTINUED | OUTPATIENT
Start: 2018-10-03 | End: 2018-10-04 | Stop reason: HOSPADM

## 2018-10-03 RX ORDER — OXYCODONE AND ACETAMINOPHEN 5; 325 MG/1; MG/1
1 TABLET ORAL
Qty: 50 TABLET | Refills: 0 | Status: SHIPPED | OUTPATIENT
Start: 2018-10-03 | End: 2019-01-17

## 2018-10-03 RX ORDER — AMOXICILLIN 250 MG
1 CAPSULE ORAL 2 TIMES DAILY
Status: DISCONTINUED | OUTPATIENT
Start: 2018-10-03 | End: 2018-10-04 | Stop reason: HOSPADM

## 2018-10-03 RX ORDER — DOCUSATE SODIUM 100 MG/1
100 CAPSULE, LIQUID FILLED ORAL 2 TIMES DAILY PRN
Qty: 60 CAPSULE | Refills: 0 | Status: SHIPPED | OUTPATIENT
Start: 2018-10-03 | End: 2021-10-12

## 2018-10-03 RX ORDER — SODIUM CHLORIDE 0.9 % (FLUSH) 0.9 %
3 SYRINGE (ML) INJECTION
Status: DISCONTINUED | OUTPATIENT
Start: 2018-10-03 | End: 2018-10-03 | Stop reason: HOSPADM

## 2018-10-03 RX ORDER — MIDAZOLAM HYDROCHLORIDE 1 MG/ML
INJECTION, SOLUTION INTRAMUSCULAR; INTRAVENOUS
Status: DISCONTINUED | OUTPATIENT
Start: 2018-10-03 | End: 2018-10-03

## 2018-10-03 RX ORDER — CEFAZOLIN SODIUM 1 G/3ML
2 INJECTION, POWDER, FOR SOLUTION INTRAMUSCULAR; INTRAVENOUS
Status: COMPLETED | OUTPATIENT
Start: 2018-10-03 | End: 2018-10-03

## 2018-10-03 RX ORDER — VANCOMYCIN HCL IN 5 % DEXTROSE 1.5G/250ML
1500 PLASTIC BAG, INJECTION (ML) INTRAVENOUS
Status: COMPLETED | OUTPATIENT
Start: 2018-10-03 | End: 2018-10-03

## 2018-10-03 RX ORDER — CELECOXIB 200 MG/1
400 CAPSULE ORAL
Status: COMPLETED | OUTPATIENT
Start: 2018-10-03 | End: 2018-10-03

## 2018-10-03 RX ORDER — RAMELTEON 8 MG/1
8 TABLET ORAL NIGHTLY PRN
Status: DISCONTINUED | OUTPATIENT
Start: 2018-10-03 | End: 2018-10-04 | Stop reason: HOSPADM

## 2018-10-03 RX ORDER — CEFAZOLIN SODIUM 1 G/3ML
2 INJECTION, POWDER, FOR SOLUTION INTRAMUSCULAR; INTRAVENOUS
Status: COMPLETED | OUTPATIENT
Start: 2018-10-03 | End: 2018-10-04

## 2018-10-03 RX ORDER — OXYCODONE HYDROCHLORIDE 5 MG/1
5 TABLET ORAL
Status: DISCONTINUED | OUTPATIENT
Start: 2018-10-03 | End: 2018-10-04 | Stop reason: HOSPADM

## 2018-10-03 RX ORDER — CELECOXIB 200 MG/1
200 CAPSULE ORAL DAILY
Status: DISCONTINUED | OUTPATIENT
Start: 2018-10-04 | End: 2018-10-04 | Stop reason: HOSPADM

## 2018-10-03 RX ORDER — MORPHINE SULFATE 4 MG/ML
2 INJECTION, SOLUTION INTRAMUSCULAR; INTRAVENOUS
Status: DISCONTINUED | OUTPATIENT
Start: 2018-10-03 | End: 2018-10-04 | Stop reason: HOSPADM

## 2018-10-03 RX ORDER — MUPIROCIN 20 MG/G
1 OINTMENT TOPICAL 2 TIMES DAILY
Status: DISCONTINUED | OUTPATIENT
Start: 2018-10-03 | End: 2018-10-04 | Stop reason: HOSPADM

## 2018-10-03 RX ORDER — SODIUM CHLORIDE 9 MG/ML
INJECTION, SOLUTION INTRAVENOUS CONTINUOUS PRN
Status: DISCONTINUED | OUTPATIENT
Start: 2018-10-03 | End: 2018-10-03

## 2018-10-03 RX ORDER — BISACODYL 10 MG
10 SUPPOSITORY, RECTAL RECTAL EVERY 12 HOURS PRN
Status: DISCONTINUED | OUTPATIENT
Start: 2018-10-03 | End: 2018-10-04 | Stop reason: HOSPADM

## 2018-10-03 RX ORDER — MUPIROCIN 20 MG/G
1 OINTMENT TOPICAL
Status: COMPLETED | OUTPATIENT
Start: 2018-10-03 | End: 2018-10-03

## 2018-10-03 RX ORDER — SODIUM CHLORIDE 9 MG/ML
INJECTION, SOLUTION INTRAVENOUS
Status: COMPLETED | OUTPATIENT
Start: 2018-10-03 | End: 2018-10-03

## 2018-10-03 RX ORDER — ACETAMINOPHEN 10 MG/ML
INJECTION, SOLUTION INTRAVENOUS
Status: COMPLETED
Start: 2018-10-03 | End: 2018-10-03

## 2018-10-03 RX ADMIN — MIDAZOLAM HYDROCHLORIDE 1 MG: 1 INJECTION, SOLUTION INTRAMUSCULAR; INTRAVENOUS at 09:10

## 2018-10-03 RX ADMIN — ACETAMINOPHEN 1000 MG: 10 INJECTION, SOLUTION INTRAVENOUS at 01:10

## 2018-10-03 RX ADMIN — PHENYLEPHRINE HYDROCHLORIDE 100 MCG: 10 INJECTION INTRAVENOUS at 10:10

## 2018-10-03 RX ADMIN — MUPIROCIN 1 G: 20 OINTMENT TOPICAL at 06:10

## 2018-10-03 RX ADMIN — DEXAMETHASONE SODIUM PHOSPHATE 8 MG: 4 INJECTION, SOLUTION INTRAMUSCULAR; INTRAVENOUS at 09:10

## 2018-10-03 RX ADMIN — PHENYLEPHRINE HYDROCHLORIDE 200 MCG: 10 INJECTION INTRAVENOUS at 10:10

## 2018-10-03 RX ADMIN — SODIUM CHLORIDE: 0.9 INJECTION, SOLUTION INTRAVENOUS at 08:10

## 2018-10-03 RX ADMIN — FENTANYL CITRATE 6 ML/HR: 50 INJECTION, SOLUTION INTRAMUSCULAR; INTRAVENOUS at 12:10

## 2018-10-03 RX ADMIN — EPHEDRINE SULFATE 5 MG: 50 INJECTION, SOLUTION INTRAMUSCULAR; INTRAVENOUS; SUBCUTANEOUS at 11:10

## 2018-10-03 RX ADMIN — CEFAZOLIN 2 G: 330 INJECTION, POWDER, FOR SOLUTION INTRAMUSCULAR; INTRAVENOUS at 05:10

## 2018-10-03 RX ADMIN — VANCOMYCIN HYDROCHLORIDE 1500 MG: 10 INJECTION, POWDER, LYOPHILIZED, FOR SOLUTION INTRAVENOUS at 07:10

## 2018-10-03 RX ADMIN — CELECOXIB 400 MG: 200 CAPSULE ORAL at 06:10

## 2018-10-03 RX ADMIN — ACETAMINOPHEN 1000 MG: 500 TABLET ORAL at 05:10

## 2018-10-03 RX ADMIN — PROPOFOL 20 MG: 10 INJECTION, EMULSION INTRAVENOUS at 09:10

## 2018-10-03 RX ADMIN — SODIUM CHLORIDE, SODIUM GLUCONATE, SODIUM ACETATE, POTASSIUM CHLORIDE, MAGNESIUM CHLORIDE, SODIUM PHOSPHATE, DIBASIC, AND POTASSIUM PHOSPHATE: .53; .5; .37; .037; .03; .012; .00082 INJECTION, SOLUTION INTRAVENOUS at 10:10

## 2018-10-03 RX ADMIN — PREGABALIN 150 MG: 150 CAPSULE ORAL at 06:10

## 2018-10-03 RX ADMIN — MEPIVACAINE HYDROCHLORIDE 45 MG: 20 INJECTION, SOLUTION EPIDURAL; INFILTRATION at 09:10

## 2018-10-03 RX ADMIN — FAMOTIDINE 20 MG: 20 TABLET ORAL at 01:10

## 2018-10-03 RX ADMIN — FAMOTIDINE 20 MG: 20 TABLET ORAL at 09:10

## 2018-10-03 RX ADMIN — VANCOMYCIN HYDROCHLORIDE 1500 MG: 10 INJECTION, POWDER, LYOPHILIZED, FOR SOLUTION INTRAVENOUS at 06:10

## 2018-10-03 RX ADMIN — FENTANYL CITRATE 50 MCG: 50 INJECTION, SOLUTION INTRAMUSCULAR; INTRAVENOUS at 09:10

## 2018-10-03 RX ADMIN — PHENYLEPHRINE HYDROCHLORIDE 200 MCG: 10 INJECTION INTRAVENOUS at 11:10

## 2018-10-03 RX ADMIN — VANCOMYCIN HYDROCHLORIDE 1.5 G: 1 INJECTION, POWDER, LYOPHILIZED, FOR SOLUTION INTRAVENOUS at 10:10

## 2018-10-03 RX ADMIN — PREGABALIN 75 MG: 75 CAPSULE ORAL at 09:10

## 2018-10-03 RX ADMIN — MECLIZINE 25 MG: 12.5 TABLET ORAL at 05:10

## 2018-10-03 RX ADMIN — TAMSULOSIN HYDROCHLORIDE 0.4 MG: 0.4 CAPSULE ORAL at 01:10

## 2018-10-03 RX ADMIN — SENNOSIDES AND DOCUSATE SODIUM 1 TABLET: 8.6; 5 TABLET ORAL at 09:10

## 2018-10-03 RX ADMIN — LIDOCAINE HYDROCHLORIDE 1 MG: 10 INJECTION, SOLUTION EPIDURAL; INFILTRATION; INTRACAUDAL at 06:10

## 2018-10-03 RX ADMIN — CEFAZOLIN 2 G: 330 INJECTION, POWDER, FOR SOLUTION INTRAMUSCULAR; INTRAVENOUS at 10:10

## 2018-10-03 RX ADMIN — ASPIRIN 81 MG: 81 TABLET, COATED ORAL at 01:10

## 2018-10-03 RX ADMIN — ASPIRIN 81 MG: 81 TABLET, COATED ORAL at 09:10

## 2018-10-03 RX ADMIN — PROPOFOL 100 MCG/KG/MIN: 10 INJECTION, EMULSION INTRAVENOUS at 09:10

## 2018-10-03 RX ADMIN — EPHEDRINE SULFATE 10 MG: 50 INJECTION, SOLUTION INTRAMUSCULAR; INTRAVENOUS; SUBCUTANEOUS at 11:10

## 2018-10-03 RX ADMIN — SODIUM CHLORIDE, SODIUM GLUCONATE, SODIUM ACETATE, POTASSIUM CHLORIDE, MAGNESIUM CHLORIDE, SODIUM PHOSPHATE, DIBASIC, AND POTASSIUM PHOSPHATE: .53; .5; .37; .037; .03; .012; .00082 INJECTION, SOLUTION INTRAVENOUS at 11:10

## 2018-10-03 RX ADMIN — KETAMINE HYDROCHLORIDE 25 MG: 10 INJECTION, SOLUTION INTRAMUSCULAR; INTRAVENOUS at 09:10

## 2018-10-03 RX ADMIN — ONDANSETRON 4 MG: 2 INJECTION INTRAMUSCULAR; INTRAVENOUS at 08:10

## 2018-10-03 RX ADMIN — SODIUM CHLORIDE: 0.9 INJECTION, SOLUTION INTRAVENOUS at 12:10

## 2018-10-03 RX ADMIN — SODIUM CHLORIDE: 0.9 INJECTION, SOLUTION INTRAVENOUS at 06:10

## 2018-10-03 RX ADMIN — LIDOCAINE HYDROCHLORIDE,EPINEPHRINE BITARTRATE 3 ML: 15; .005 INJECTION, SOLUTION EPIDURAL; INFILTRATION; INTRACAUDAL; PERINEURAL at 11:10

## 2018-10-03 RX ADMIN — SENNOSIDES AND DOCUSATE SODIUM 1 TABLET: 8.6; 5 TABLET ORAL at 01:10

## 2018-10-03 NOTE — INTERVAL H&P NOTE
The patient has been examined and the H&P has been reviewed:    I concur with the findings and no changes have occurred since H&P was written.    Anesthesia/Surgery risks, benefits and alternative options discussed and understood by patient/family.          Active Hospital Problems    Diagnosis  POA    Primary osteoarthritis of right hip [M16.11]  Yes      Resolved Hospital Problems   No resolved problems to display.

## 2018-10-03 NOTE — TRANSFER OF CARE
"Anesthesia Transfer of Care Note    Patient: Pierre Grossman    Procedure(s) Performed: Procedure(s) (LRB):  REPLACEMENT-HIP-TOTAL WITH NAVIGATION (Right)    Patient location: PACU    Anesthesia Type: CSE    Transport from OR: Transported from OR on 6-10 L/min O2 by face mask with adequate spontaneous ventilation    Post pain: adequate analgesia    Post assessment: tolerated procedure well and no apparent anesthetic complications    Post vital signs: stable    Level of consciousness: responds to stimulation and sedated    Nausea/Vomiting: no nausea/vomiting    Complications: none    Transfer of care protocol was followed      Last vitals:   Visit Vitals  /74 (BP Location: Right arm, Patient Position: Lying)   Pulse 63   Temp 36.4 °C (97.5 °F) (Oral)   Resp 11   Ht 5' 10" (1.778 m)   Wt 93.9 kg (207 lb)   SpO2 100%   BMI 29.70 kg/m²     "

## 2018-10-03 NOTE — PT/OT/SLP EVAL
Occupational Therapy   Evaluation    Name: Pierre Grossman  MRN: [id number]  Admitting Diagnosis:  <principal problem not specified> Day of Surgery    Recommendations:     Discharge recommendations: Home w/ HH    Barriers to discharge: None    Equipment recommendations: Hip kit    History:     Occupational Profile:  Living Environment: Pt lives with wife in 2SH w/ no CORINNE; pt reports he is able to reside on 1st floor if necessary. He has a R HR support on staircase to 2nd floor. He uses a walk-in shower w/ built in bench  Previous level of function: PTA, pt reports being independent w/ ADLs and functional mobility   Equipment Owned: rolling walker, bedside commode and cane  And built in shower bench  Assistance Upon Discharge: Wife    Past Medical History:   Diagnosis Date    Head injury 1986       Past Surgical History:   Procedure Laterality Date    CERVICAL FUSION      COLONOSCOPY N/A 5/30/2018    Procedure: COLONOSCOPY;  Surgeon: Zakia Mirza MD;  Location: [facility] ENDO;  Service: Endoscopy;  Laterality: N/A;    COLONOSCOPY N/A 5/30/2018    Performed by Zakia Mirza MD at [facility] ENDO    EYE SURGERY      cataract    HERNIA REPAIR         Subjective     Communicated with: RN prior to session.    Pt agreeable to Evaluation.    Pain/Comfort:  Pain level: 0/10 in R hip    Objective:     Pt found supine in bed with blood pressure cuff, fulton catheter, telemetry, PCEA, pulse ox, Peripheral IV and FCD.    Orthopedic Precautions: R LE: weight bearing as tolerated; R LE posterior hip precautions    Occupational Performance:    Bed Mobility:    Supine to sit: CGA  Sit to supine: CGA    Transfers:    Sit<>Stand: Min A, SW    Ambulation:    Pt ambulated 3 steps forward, backward, and side stepped to HOB w/ Min A and SW - no SOB noted but pt w/ minor LOB but able to correct w/ assist from therapist.     Activities of Daily Living:  UE dressing: Maximum Assistance to melissa gown around back  LE dressing: Total  Assistance to melissa socks  Pt educated on LE dressing techniques and need for AD with LE dressing    Therapeutic Activities and Exercises:  Pt educated on posterior hip precautions.  Pt able to recall 2/3 hip precautions.     Patient left supine in bed with all lines intact and RN notified.    [facility] 6 Click: Self-care  [facility] Total Score: 16    Education:    Assessment:     Pierre Grossman is a 58 y.o. male with a medical diagnosis of <principal problem not specified>.  He presents with decreased function of R LE impeding his ability to perform ADLs and functional mobility and would benefit from OT services to maximize functional (I) and safety.    Performance deficits affecting function are weakness, impaired endurance, impaired self care skills, impaired functional mobilty, gait instability, impaired balance, decreased lower extremity function, decreased safety awareness, pain, impaired skin, decreased ROM, edema, orthopedic precautions..      Rehab Prognosis:  Good    Plan:     Patient to be seen QD to address the above listed problems via self-care/home management, therapeutic activities, therapeutic exercises    Plan of Care Reviewed with: patient    This Plan of care has been discussed with the patient who was involved in its development and understands and is in agreement with the identified goals and treatment plan    GOALS:   Multidisciplinary Problems     Occupational Therapy Goals        Problem: Occupational Therapy Goal    Goal Priority Disciplines Outcome Interventions   Occupational Therapy Goal     OT, PT/OT Ongoing (interventions implemented as appropriate)    Description:  Goals to be met by: 7 days    Patient will increase functional independence with ADLs by performing:    UE Dressing with Supervision.  LE Dressing with Supervision with AD as needed.  Grooming while standing with Supervision.  Toileting from bedside commode with Supervision for hygiene and clothing management.   Stand pivot  transfers with Supervision.  Toilet transfer to bedside commode with Supervision.                         Time Tracking:     OT Received On: 10/3/2018  OT Start Time: 1450  OT Stop Time: 1510   OT Total Time: 20 minutes     Billable Minutes:  Evaluation 12 minutes  Therapeutic Activity 8 minutes    Katheryn Stevens, OT  10/3/2018

## 2018-10-03 NOTE — NURSING TRANSFER
Nursing Transfer Note      10/3/2018     Transfer to 540A    Transfer via stretcher    Transfer with sapphire and iv pumps. EtCo2 monitor    Transported by pct    Medicines sent: epidural    Chart send with patient: yes    Notified:wife    Patient reassessed at: 10/3/18 @ 1500

## 2018-10-03 NOTE — ANESTHESIA PROCEDURE NOTES
CSE    Patient location during procedure: OR  Start time: 10/3/2018 9:40 AM  Timeout: 10/3/2018 9:39 AM  End time: 10/3/2018 9:53 AM  Staffing  Anesthesiologist: Tessa Hitchcock MD  Resident/CRNA: Darien Nevarez MD  Performed: resident/CRNA   Preanesthetic Checklist  Completed: patient identified, site marked, surgical consent, pre-op evaluation, timeout performed, IV checked, risks and benefits discussed and monitors and equipment checked  CSE  Patient position: sitting  Prep: ChloraPrep  Patient monitoring: heart rate, continuous pulse ox and frequent blood pressure checks  Approach: midline  Spinal Needle  Needle type: Tuohy   Needle gauge: 24 G  Needle length: 3.5 in  Epidural Needle  Injection technique: SALEEM saline  Needle type: Tuohy   Needle gauge: 17 G  Needle length: 3.5 in  Needle insertion depth: 6 cm  Location: L3-4  Needle localization: anatomical landmarks  Catheter  Catheter type: springwound  Catheter size: 18 G  Catheter at skin depth: 10 cm  Assessment  Intrathecal Medications:  administered: primary anesthetic mcg of

## 2018-10-03 NOTE — ADDENDUM NOTE
Addendum  created 10/03/18 1402 by Darien Nevarez MD    Intraprocedure Blocks edited, Sign clinical note

## 2018-10-03 NOTE — ADDENDUM NOTE
Addendum  created 10/03/18 1414 by Semaj Carrizales MD    Order list changed, Order sets accessed

## 2018-10-03 NOTE — OP NOTE
DATE OF PROCEDURE:  10/03/2018    SURGEON:  John [facility]wood [facility], Jr., M.D.    ASSISTANT:  Dejah Martínez PA-C    OPERATION PERFORMED:  Right total hip arthroplasty with computer navigation.    PREOPERATIVE DIAGNOSIS:  Osteoarthritis, right hip.    POSTOPERATIVE DIAGNOSIS:  Osteoarthritis, right hip.    COMPONENTS USED:  Sergey trabecular metal system.  We used a 56 mm trabecular   metal shell, size KK with a 40 inner diameter neutral liner, size KK, vitamin E,   highly cross-linked poly, trabecular metal stem size 13 extended offset with   the 40 mm Biolox ceramic femoral head S40 with a -3.5 neck.  The computer   navigation was the [employer] system.  Final setting was 40 of abduction, 17 of   anteversion.  Leg length 1 longer, 2 lateral.    No residents were available.  JAIRON Fisher, scrubbed on this case and was   necessary.    ESTIMATED BLOOD LOSS:  500 mL.    SPECIMENS:  Resected bone and tissue sent to Pathology for routine examination.    OPERATIVE TECHNIQUE:  The patient was placed supine on the operating table.    Spinal anesthesia was introduced.  The patient was placed in lateral decubitus   position with the right hip up.  The right hip and leg were prepped and draped   in sterile fashion.  Two pins were placed in the anterosuperior iliac crest.  An   EKG pad had been placed on the patella.  The posterior lateral incision was   made.  Sharp dissection was carried down to the gluteus sabino fibers.  Gluteus   sabino fibers were split in line with the orientation.  A screw was placed in   the greater trochanter.  Computer navigation was achieved.  We released the   posterior structures and incised the capsule, dislocated the hip posteriorly,   made the femoral neck cut about 10 to 15 above the lesser troch.  We then   broached and prepared the femur to get the anteversion.  The anteversion was   about 10 degrees, maybe 15, but no more.  At that point that guided me in my cup   position.  We then  mapped the acetabulum, reamed with a 51 and went up to a 55.    We put a 56 mm trabecular metal shell in with 20 to 30 degrees of forward   flexion and 40 degrees of abduction.  We put the trial liner in and prepared the   femur, went up to the 13 and did a trial reduction and good stability, good   position, so we went ahead and put the actual liner in, put the 13 stem in, did   a trial reduction.  It was between the -3.5 and 0.  I went with the -3.5.  We   then Pulsavac'd and cleared the area, used the Betadine solution and Pulsavac   cleared and repaired the posterior structures with #2 Vicryl through a drill   hole in the greater trochanter, closed the gluteus sabino fibers with #1 Vicryl   over tranexamic acid and vancomycin powder, closed the subcutaneous tissues   with 0 and 3-0 Vicryl, closed the skin with a running locked subcuticular 3-0   Monocryl and skin adhesive.  Sterile surgical dressing was applied.  There were   no complications to the procedure.      VALERIE  dd: 10/03/2018 12:17:55 (CDT)  td: 10/03/2018 13:19:50 (CDT)  Doc ID   #[id number]  Job ID #[id number]    CC:

## 2018-10-03 NOTE — NURSING
Patient arrived to [facility] from surgery. VIDA MAYEN Will monitor.

## 2018-10-03 NOTE — PLAN OF CARE
Problem: Patient Care Overview  Goal: Plan of Care Review  Outcome: Ongoing (interventions implemented as appropriate)  Vital signs stable. Afebrile. Alert, oriented and following commands. Pain controlled with PCEA. IV fluids per MD order. [place] intact with clear, yellow urine. Surgical dressing remains CDI. Post operative labs, xrays and pt/ot eval completed.  POC reviewed with pt and spouse. All questions/concerns addressed and understanding verbalized.

## 2018-10-03 NOTE — BRIEF OP NOTE
[facility]  Surgery Department  Operative Note    SUMMARY     Date of Procedure: 10/3/2018     Procedure: Procedure(s) (LRB):  REPLACEMENT-HIP-TOTAL WITH NAVIGATION (Right)     Surgeon(s) and Role:     * John L. [facility] Jr., MD - Primary    Assisting Surgeon: None    Pre-Operative Diagnosis: Osteoarthritis of right hip, unspecified osteoarthritis type [M16.11]    Post-Operative Diagnosis: Post-Op Diagnosis Codes:     * Osteoarthritis of right hip, unspecified osteoarthritis type [M16.11]    Anesthesia: Spinal/Epidural    Technical Procedures Used:  40     Description of the Findings of the Procedure: noncemented    Significant Surgical Tasks Conducted by the Assistant(s), if Applicable: closure    Complications: No    Estimated Blood Loss (EBL): * No values recorded between 10/3/2018 10:27 AM and 10/3/2018 12:07 PM *           Implants:   Implant Name Type Inv. Item Serial No.  Lot No. LRB No. Used   SHELL CONTINUUM MULTI 56 - [id number]  SHELL CONTINUUM MULTI 56  [employer] [id number] Right 1   BIT DRILL PIN TROCAR 3.2X80MM - [id number]  BIT DRILL PIN TROCAR 3.2X80MM  [employer] [id number] Right 1   SCREW BONE 6.5X15 SELF-TAP - [id number]  SCREW BONE 6.5X15 SELF-TAP  [employer] [id number] Right 1   HIP SYS NAVITRACKER SZ B - [id number]  HIP SYS NAVITRACKER SZ B  [employer] [id number] Right 1   LINER CONTINUUM VIVACIT-E40X56 - [id number]  LINER CONTINUUM VIVACIT-E40X56  [employer] [id number] Right 1   STEM FEM PRIMARY HIP SYSTEM - [id number]  STEM FEM PRIMARY HIP SYSTEM  [employer] [id number] Right 1   HEAD BIOLOX DELTA FEM40MM-3.5 - [id number]  HEAD BIOLOX DELTA [id number]-8.5  [employer] [id number] Right 1       Specimens:   Specimen (12h ago, onward)    Start     Ordered    10/03/18 1133  Specimen to Pathology - Surgery  Once     Comments:  1. Right femoral head - gross     Start Status   10/03/18 1133 Collected (10/03/18 1133)       10/03/18 1133                  Condition: Good    Disposition: PACU -  hemodynamically stable.    Attestation: I was present and scrubbed for the entire procedure.

## 2018-10-03 NOTE — PROGRESS NOTES
DOCUMENTATION ONLY:  Prior Authorization for Mr. Bashir Percoet was  approved from 10/3/2018 to 11/2/2018    Case Id:[id number]      Aury Demarco  Pharmacy Technician   [facility]  Phone: [phone]  Fax: [phone]

## 2018-10-03 NOTE — PLAN OF CARE
Problem: Physical Therapy Goal  Goal: Physical Therapy Goal  Goals to be met by: 10/10/18     Patient will increase functional independence with mobility by performin. Supine to sit with Set-up [place]  2. Sit to supine with Set-up [place]  3. Sit to stand transfer with Supervision  4. Bed to chair transfer with Stand-by Assistance using Rolling Walker  5. Gait  x 100 feet with Stand-by Assistance using Rolling Walker.   6. Ascend/descend 4 stair with (R) Handrails Contact Guard Assistance.   7. Ascend/Descend 7 inch curb step with Contact Guard Assistance using Rolling Walker.  8. Lower extremity exercise program x20-30 reps per handout, with independence      Outcome: Ongoing (interventions implemented as appropriate)  PT eval complete. Pt tolerated session well today with no complications and demonstrated appropriate mobility s/p (R) MELISSA. Pt with no LOB during ambulation using SW for support. Pt able to follow 3-point gait sequencing well with no complications. Pt will cont to benefit from skilled therapy services and is appropriate for HHPT upon d/c.

## 2018-10-03 NOTE — PT/OT/SLP EVAL
Physical Therapy Evaluation    Patient Name:  Pierre Grossman   MRN:  [id number]    Recommendations:     Discharge Recommendations:  home with home health   Discharge Equipment Recommendations: hip kit   Barriers to discharge: Inaccessible home    Assessment:     Pierre Grossman is a 58 y.o. male admitted with a medical diagnosis of <principal problem not specified>.  He presents with the following impairments/functional limitations:  weakness, impaired endurance, impaired self care skills, impaired balance, gait instability, pain, impaired joint extensibility, decreased lower extremity function, decreased ROM, orthopedic precautions, edema, impaired functional mobilty.    -PT eval complete. Pt tolerated session well today with no complications and demonstrated appropriate mobility s/p (R) MELISSA. Pt with no LOB during ambulation using SW for support. Pt able to follow 3-point gait sequencing well with no complications. Pt will cont to benefit from skilled therapy services and is appropriate for HHPT upon d/c.    Rehab Prognosis:  Good; patient would benefit from acute skilled PT services to address these deficits and reach maximum level of function.      Recent Surgery: Procedure(s) (LRB):  REPLACEMENT-HIP-TOTAL WITH NAVIGATION (Right) Day of Surgery    Plan:     During this hospitalization, patient to be seen BID to address the above listed problems via gait training, therapeutic activities, therapeutic exercises, neuromuscular re-education  · Plan of Care Expires:  11/03/18   Plan of Care Reviewed with: patient    Subjective     Communicated with nsg prior to session.  Patient found supine upon PT entry to room, agreeable to evaluation.      Chief Complaint: impaired mobility  Patient comments/goals: return home to [facility]  Pain/Comfort:  · Pain Rating 1: 0/10  · Pain Rating Post-Intervention 1: 0/10    Patients cultural, spiritual, Methodist conflicts given the current situation: none stated    Living Environment:  -Pt  lives with his wife in a 2SH with no CORINNE and a full flight of stairs to 2nd floor with (R) handrails. Pt has a tub/shower combo with a built-in seat.   Prior to admission, patients level of function was (I).  Patient has the following equipment: walker, rolling, bedside commode, bath bench.  DME owned (not currently used): .  Upon discharge, patient will have assistance from family.    Objective:     Patient found with: blood pressure cuff, FCD, fulton catheter, oxygen, perineural catheter, peripheral IV, telemetry, pulse ox (continuous)     General Precautions: Standard, fall   Orthopedic Precautions:RLE weight bearing as tolerated   Braces: N/A     Exams:  · Sensation:    · -       Intact  light/touch (B) LE  · RLE ROM: WFL except limits from hip precautions  · RLE Strength: Deficits: 4/5 grossly  · LLE ROM: WFL  · LLE Strength: WFL    Functional Mobility:  · Bed Mobility:     · Scooting: contact guard assistance  · Supine to Sit: contact guard assistance  · Sit to Supine: contact guard assistance  · Transfers:     · Sit to Stand:  minimum assistance with standard walker  · Gait: 3 steps fwd/back, 3 lateral steps using SW with min (A)    AM-PAC 6 CLICK MOBILITY  Total Score:18       Therapeutic Activities and Exercises:   -Pt educated on:  A. PT POC and role of PT  B. Importance of OOB activity to improve functional outcomes   C. DME mgmt and t/f sequencing  D. Performing HEP to reduce the risk of developing blood clots  E. Gait sequencing   F. D/c planning      Patient left up in chair with all lines intact, call button in reach and nsg notified.    GOALS:   Multidisciplinary Problems     Physical Therapy Goals        Problem: Physical Therapy Goal    Goal Priority Disciplines Outcome Goal Variances Interventions   Physical Therapy Goal     PT, PT/OT Ongoing (interventions implemented as appropriate)     Description:  Goals to be met by: 10/10/18     Patient will increase functional independence with mobility by  performin. Supine to sit with Set-up [place]  2. Sit to supine with Set-up [place]  3. Sit to stand transfer with Supervision  4. Bed to chair transfer with Stand-by Assistance using Rolling Walker  5. Gait  x 100 feet with Stand-by Assistance using Rolling Walker.   6. Ascend/descend 4 stair with (R) Handrails Contact Guard Assistance.   7. Ascend/Descend 7 inch curb step with Contact Guard Assistance using Rolling Walker.  8. Lower extremity exercise program x20-30 reps per handout, with independence                        History:     Past Medical History:   Diagnosis Date    Head injury        Past Surgical History:   Procedure Laterality Date    CERVICAL FUSION      COLONOSCOPY N/A 2018    Procedure: COLONOSCOPY;  Surgeon: Zakia Mirza MD;  Location: [facility];  Service: Endoscopy;  Laterality: N/A;    COLONOSCOPY N/A 2018    Performed by Zakia Mirza MD at [facility]    EYE SURGERY      cataract    HERNIA REPAIR         Clinical Decision Making:     History  Co-morbidities and personal factors that may impact the plan of care Examination  Body Structures and Functions, activity limitations and participation restrictions that may impact the plan of care Clinical Presentation   Decision Making/ Complexity Score   Co-morbidities:   [] Time since onset of injury / illness / exacerbation  [] Status of current condition  []Patient's cognitive status and safety concerns    [] Multiple Medical Problems (see med hx)  Personal Factors:   [] Patient's age  [] Prior Level of function   [] Patient's home situation (environment and family support)  [] Patient's level of motivation  [] Expected progression of patient      HISTORY:(criteria)    [] 61934 - no personal factors/history    [] 55672 - has 1-2 personal factor/comorbidity     [] 62898 - has >3 personal factor/comorbidity     Body Regions:  [] Objective examination findings  [] Head     []  Neck  [] Trunk   [] Upper  Extremity  [] Lower Extremity    Body Systems:  [] For communication ability, affect, cognition, language, and learning style: the assessment of the ability to make needs known, consciousness, orientation (person, place, and time), expected emotional /behavioral responses, and learning preferences (eg, learning barriers, education  needs)  [] For the neuromuscular system: a general assessment of gross coordinated movement (eg, balance, gait, locomotion, transfers, and transitions) and motor function  (motor control and motor learning)  [] For the musculoskeletal system: the assessment of gross symmetry, gross range of motion, gross strength, height, and weight  [] For the integumentary system: the assessment of pliability(texture), presence of scar formation, skin color, and skin integrity  [] For cardiovascular/pulmonary system: the assessment of heart rate, respiratory rate, blood pressure, and edema     Activity limitations:    [] Patient's cognitive status and saf ety concerns          [] Status of current condition      [] Weight bearing restriction  [] Cardiopulmunary Restriction    Participation Restrictions:   [] Goals and goal agreement with the patient     [] Rehab potential (prognosis) and probable outcome      Examination of Body System: (criteria)    [] 46617 - addressing 1-2 elements    [] 60225 - addressing a total of 3 or more elements     [] 47004 -  Addressing a total of 4 or more elements         Clinical Presentation: (criteria)  Choose one     On examination of body system using standardized tests and measures patient presents with (CHOOSE ONE) elements from any of the following: body structures and functions, activity limitations, and/or participation restrictions.  Leading to a clinical presentation that is considered (CHOOSE ONE)                              Clinical Decision Making  (Eval Complexity):  Choose One     Time Tracking:     PT Received On: 10/03/18  PT Start Time: 1450     PT  Stop Time: 1510  PT Total Time (min): 20 min     Billable Minutes: Evaluation 12 and Gait Training 8      Jose Briceno, PT  10/03/2018

## 2018-10-03 NOTE — PLAN OF CARE
Problem: Occupational Therapy Goal  Goal: Occupational Therapy Goal  Goals to be met by: 7 days    Patient will increase functional independence with ADLs by performing:    UE Dressing with Supervision.  LE Dressing with Supervision with AD as needed.  Grooming while standing with Supervision.  Toileting from bedside commode with Supervision for hygiene and clothing management.   Stand pivot transfers with Supervision.  Toilet transfer to bedside commode with Supervision.       Outcome: Ongoing (interventions implemented as appropriate)  OT eval complete. Pt tolerated session well. Pt's functional outcomes established today based on her presenting functional level.    Comments: Cont OT POC

## 2018-10-03 NOTE — ANESTHESIA POSTPROCEDURE EVALUATION
"Anesthesia Post Evaluation    Patient: Pierre Grossman    Procedure(s) Performed: Procedure(s) (LRB):  REPLACEMENT-HIP-TOTAL WITH NAVIGATION (Right)    Final Anesthesia Type: CSE  Patient location during evaluation: PACU  Patient participation: Yes- Able to Participate  Level of consciousness: awake and alert and oriented  Post-procedure vital signs: reviewed and stable  Pain management: adequate  Airway patency: patent  PONV status at discharge: No PONV  Anesthetic complications: no      Cardiovascular status: blood pressure returned to baseline and hemodynamically stable  Respiratory status: unassisted, spontaneous ventilation and room air  Hydration status: euvolemic  Follow-up not needed.        Visit Vitals  /69   Pulse (!) 58   Temp 36.2 °C (97.2 °F) (Temporal)   Resp 14   Ht 5' 10" (1.778 m)   Wt 93.9 kg (207 lb)   SpO2 96%   BMI 29.70 kg/m²       Pain/Mark Score: Pain Assessment Performed: Yes (10/3/2018  1:15 PM)  Presence of Pain: complains of pain/discomfort (10/3/2018  1:15 PM)  Pain Rating Prior to Med Admin: 5 (10/3/2018  1:04 PM)  Pain Rating Post Med Admin: 5 (10/3/2018  1:15 PM)  Mark Score: 10 (10/3/2018  1:15 PM)        "

## 2018-10-03 NOTE — OPERATIVE NOTE ADDENDUM
Certification of Assistant at Surgery       Surgery Date: 10/3/2018     Participating Surgeons:  Surgeon(s) and Role:     * [facility] L. [facility] Jr., MD - Primary    Procedures:  Procedure(s) (LRB):  REPLACEMENT-HIP-TOTAL WITH NAVIGATION (Right)    Assistant Surgeon's Certification of Necessity:  I understand that section 1842 (b) (6) (d) of the Social Security Act generally prohibits Medicare Part B reasonable charge payment for the services of assistants at surgery in teaching hospitals when qualified residents are available to furnish such services. I certify that the services for which payment is claimed were medically necessary, and that no qualified resident was available to perform the services. I further understand that these services are subject to post-payment review by the Medicare carrier.      Dejah Martínez PA-C    10/03/2018  12:09 PM

## 2018-10-04 VITALS
WEIGHT: 207 LBS | RESPIRATION RATE: 16 BRPM | TEMPERATURE: 98 F | SYSTOLIC BLOOD PRESSURE: 108 MMHG | DIASTOLIC BLOOD PRESSURE: 58 MMHG | HEIGHT: 70 IN | BODY MASS INDEX: 29.63 KG/M2 | OXYGEN SATURATION: 94 % | HEART RATE: 72 BPM

## 2018-10-04 LAB — HGB BLD-MCNC: 11.5 G/DL

## 2018-10-04 PROCEDURE — 97535 SELF CARE MNGMENT TRAINING: CPT

## 2018-10-04 PROCEDURE — 99900035 HC TECH TIME PER 15 MIN (STAT)

## 2018-10-04 PROCEDURE — 36415 COLL VENOUS BLD VENIPUNCTURE: CPT

## 2018-10-04 PROCEDURE — 94761 N-INVAS EAR/PLS OXIMETRY MLT: CPT

## 2018-10-04 PROCEDURE — G8987 SELF CARE CURRENT STATUS: HCPCS | Mod: CI

## 2018-10-04 PROCEDURE — 97530 THERAPEUTIC ACTIVITIES: CPT

## 2018-10-04 PROCEDURE — G8989 SELF CARE D/C STATUS: HCPCS | Mod: CI

## 2018-10-04 PROCEDURE — 85018 HEMOGLOBIN: CPT

## 2018-10-04 PROCEDURE — 01996 DLY HOSP MGMT EDRL RX ADMIN: CPT | Mod: ,,, | Performed by: ANESTHESIOLOGY

## 2018-10-04 PROCEDURE — 25000003 PHARM REV CODE 250: Performed by: PHYSICIAN ASSISTANT

## 2018-10-04 PROCEDURE — 97116 GAIT TRAINING THERAPY: CPT

## 2018-10-04 PROCEDURE — G8988 SELF CARE GOAL STATUS: HCPCS | Mod: CI

## 2018-10-04 PROCEDURE — 63600175 PHARM REV CODE 636 W HCPCS: Performed by: PHYSICIAN ASSISTANT

## 2018-10-04 RX ADMIN — ACETAMINOPHEN 1000 MG: 500 TABLET ORAL at 12:10

## 2018-10-04 RX ADMIN — FAMOTIDINE 20 MG: 20 TABLET ORAL at 08:10

## 2018-10-04 RX ADMIN — OXYCODONE HYDROCHLORIDE 10 MG: 10 TABLET ORAL at 07:10

## 2018-10-04 RX ADMIN — POLYETHYLENE GLYCOL 3350 17 G: 17 POWDER, FOR SOLUTION ORAL at 08:10

## 2018-10-04 RX ADMIN — ACETAMINOPHEN 1000 MG: 500 TABLET ORAL at 05:10

## 2018-10-04 RX ADMIN — ASPIRIN 81 MG: 81 TABLET, COATED ORAL at 08:10

## 2018-10-04 RX ADMIN — ONDANSETRON 4 MG: 2 INJECTION INTRAMUSCULAR; INTRAVENOUS at 06:10

## 2018-10-04 RX ADMIN — CELECOXIB 200 MG: 200 CAPSULE ORAL at 08:10

## 2018-10-04 RX ADMIN — TAMSULOSIN HYDROCHLORIDE 0.4 MG: 0.4 CAPSULE ORAL at 08:10

## 2018-10-04 RX ADMIN — CEFAZOLIN 2 G: 330 INJECTION, POWDER, FOR SOLUTION INTRAMUSCULAR; INTRAVENOUS at 02:10

## 2018-10-04 RX ADMIN — SENNOSIDES AND DOCUSATE SODIUM 1 TABLET: 8.6; 5 TABLET ORAL at 08:10

## 2018-10-04 RX ADMIN — MUPIROCIN 1 G: 20 OINTMENT TOPICAL at 08:10

## 2018-10-04 NOTE — SUBJECTIVE & OBJECTIVE
"Principal Problem:Primary osteoarthritis of right hip    Principal Orthopedic Problem: Same    Interval History: Patient seen and examined at bedside.  No acute events overnight.  Pain controlled.       Review of patient's allergies indicates:   Allergen Reactions    Iodinated contrast- oral and iv dye Shortness Of Breath and Swelling       Current Facility-Administered Medications   Medication    0.9%  NaCl infusion    acetaminophen tablet 1,000 mg    aspirin EC tablet 81 mg    bisacodyl suppository 10 mg    celecoxib capsule 200 mg    famotidine tablet 20 mg    meclizine tablet 25 mg    morphine injection 2 mg    morphine injection 2 mg    mupirocin 2 % ointment 1 g    naloxone 0.4 mg/mL injection 0.02 mg    ondansetron injection 4 mg    oxyCODONE immediate release tablet 5 mg    oxyCODONE immediate release tablet Tab 10 mg    polyethylene glycol packet 17 g    pregabalin capsule 75 mg    promethazine (PHENERGAN) 6.25 mg in dextrose 5 % 50 mL IVPB    ramelteon tablet 8 mg    senna-docusate 8.6-50 mg per tablet 1 tablet    tamsulosin 24 hr capsule 0.4 mg     Objective:     Vital Signs (Most Recent):  Temp: 97.8 °F (36.6 °C) (10/04/18 0748)  Pulse: 71 (10/04/18 0748)  Resp: 16 (10/04/18 0748)  BP: 118/63 (10/04/18 0748)  SpO2: 95 % (10/04/18 0748) Vital Signs (24h Range):  Temp:  [95.9 °F (35.5 °C)-98 °F (36.7 °C)] 97.8 °F (36.6 °C)  Pulse:  [51-75] 71  Resp:  [11-20] 16  SpO2:  [90 %-100 %] 95 %  BP: ()/(54-82) 118/63     Weight: 93.9 kg (207 lb)  Height: 5' 10" (177.8 cm)  Body mass index is 29.7 kg/m².      Intake/Output Summary (Last 24 hours) at 10/4/2018 0813  Last data filed at 10/4/2018 0544  Gross per 24 hour   Intake 4229 ml   Output 2200 ml   Net 2029 ml       Ortho/SPM Exam     AAOx4  NAD  RRR  No increased WOB  Aquacel c/d/i  SILT and motor intact T/SP/DP  WWP extremities  FCDs in place and functioning      Significant Labs: All pertinent labs within the past 24 hours have " been reviewed.    Significant Imaging: I have reviewed and interpreted all pertinent imaging results/findings.

## 2018-10-04 NOTE — PT/OT/SLP PROGRESS
"Physical Therapy Treatment    Patient Name:  Pierre Grossman   MRN:  [id number]    Recommendations:     Discharge Recommendations:  home health PT   Discharge Equipment Recommendations: hip kit, bath bench   Barriers to discharge: None    Assessment:     Pierre Grossman is a 58 y.o. male admitted with a medical diagnosis of Primary osteoarthritis of right hip.  He presents with the following impairments/functional limitations:  weakness, impaired endurance, impaired functional mobilty, impaired self care skills, impaired balance, gait instability, decreased lower extremity function, pain, orthopedic precautions, edema, impaired joint extensibility, decreased ROM.    -Pt tolerated session well today with no complications and demonstrated appropriate mobility s/p (R) MELISSA. Pt with no LOB during ambulation using RW for support. Pt able to follow 3-point gait sequencing well with no complications. Pt able to step up/down on a 6" curb step with no instability noted. Pt reported no pain with supine exercises. Pt verbalized understanding of car t/f technique and is safe to d/c home with HHPT at this time.    Rehab Prognosis:  Good; patient would benefit from acute skilled PT services to address these deficits and reach maximum level of function.      Recent Surgery: Procedure(s) (LRB):  REPLACEMENT-HIP-TOTAL WITH NAVIGATION (Right) 1 Day Post-Op    Plan:     During this hospitalization, patient to be seen BID to address the above listed problems via gait training, therapeutic exercises, therapeutic activities, neuromuscular re-education  · Plan of Care Expires:  11/03/18   Plan of Care Reviewed with: patient    Subjective     Communicated with nsg prior to session.  Patient found sitting in chair upon PT entry to room, agreeable to treatment.      Chief Complaint: impaired functional mobility  Patient comments/goals: return [facility] to [facility]  Pain/Comfort:  · Pain Rating 1: 0/10  · Pain Rating Post-Intervention 1: 0/10    Patients " "cultural, spiritual, Restorationism conflicts given the current situation: none stated    Objective:     Patient found with: pulse ox (continuous), telemetry     General Precautions: Standard, fall   Orthopedic Precautions:RLE weight bearing as tolerated, RLE posterior precautions   Braces: N/A     Functional Mobility:  · Bed Mobility:     · Scooting: stand by assistance  · Supine to Sit: stand by assistance  · Sit to Supine: stand by assistance  · Transfers:     · Sit to Stand:  stand by assistance with rolling walker  · Bed to Chair: stand by assistance with  rolling walker  using  Stand Pivot  · Gait: 100' x2 trials using RW with SBA  · Stairs:  Pt ascended/descended 6" curb step with Rolling Walker with no handrails with Contact Guard Assistance.       AM-PAC 6 CLICK MOBILITY  Turning over in bed (including adjusting bedclothes, sheets and blankets)?: 4  Sitting down on and standing up from a chair with arms (e.g., wheelchair, bedside commode, etc.): 4  Moving from lying on back to sitting on the side of the bed?: 4  Moving to and from a bed to a chair (including a wheelchair)?: 4  Need to walk in hospital room?: 4  Climbing 3-5 steps with a railing?: 4  Basic Mobility Total Score: 24       Therapeutic Activities and Exercises:   -Pt performed THR protocol exercises x30 reps of:  A. AP  B. GS  C. QS    -Pt educated on:  A. PT POC and role of PT  B. Importance of OOB activity to improve functional outcomes after surgery  C. Posterior hip precautions  D. Protecting surgical incision during t/f's  E. DME mgmt and 3-point gait sequence  F. S/s associated with THR procedure  G. Car t/f and sequencing  H. Curb step training      Patient left up in chair with all lines intact, call button in reach and nsg notified..    GOALS:   Multidisciplinary Problems     Physical Therapy Goals     Not on file          Multidisciplinary Problems (Resolved)        Problem: Physical Therapy Goal    Goal Priority Disciplines Outcome Goal " Variances Interventions   Physical Therapy Goal   (Resolved)     PT, PT/OT Outcome(s) achieved     Description:  Goals to be met by: 10/10/18     Patient will increase functional independence with mobility by performin. Supine to sit with Set-up Crystal  2. Sit to supine with Set-up Crystal  3. Sit to stand transfer with Supervision  4. Bed to chair transfer with Stand-by Assistance using Rolling Walker  5. Gait  x 100 feet with Stand-by Assistance using Rolling Walker.   6. Ascend/descend 4 stair with (R) Handrails Contact Guard Assistance.   7. Ascend/Descend 7 inch curb step with Contact Guard Assistance using Rolling Walker.  8. Lower extremity exercise program x20-30 reps per handout, with independence                        Time Tracking:     PT Received On: 10/04/18  PT Start Time: 855     PT Stop Time: 923  PT Total Time (min): 28 min     Billable Minutes: Gait Training 14 and Therapeutic Activity 14    Treatment Type: Treatment  PT/PTA: PT           Jose Briceno, PT  10/04/2018

## 2018-10-04 NOTE — PT/OT/SLP PROGRESS
Occupational Therapy   Treatment    Name: Pierre Grossman  MRN: [id number]  Admitting Diagnosis:  Primary osteoarthritis of right hip  1 Day Post-Op    Recommendations:     Discharge Recommendations: home health OT  Discharge Equipment Recommendations:  hip kit, bath bench  Barriers to discharge:  Inaccessible home environment    Subjective     Communicated with: RN prior to session.  Pain/Comfort:  · Pain Rating 1: 1/10    Patients cultural, spiritual, Protestant conflicts given the current situation: None    Objective:     Patient found with: pulse ox (continuous), telemetry    General Precautions: Standard, fall   Orthopedic Precautions:RLE weight bearing as tolerated, RLE posterior precautions   Braces: N/A     Occupational Performance:    Bed Mobility:    · Patient completed Supine to Sit with stand by assistance     Functional Mobility/Transfers:  · Patient completed Sit <> Stand Transfer with stand by assistance  with  rolling walker   · Patient completed Bed <> Chair Transfer using Stand Pivot technique with stand by assistance with rolling walker  · Patient completed Toilet Transfer Stand Pivot technique with stand by assistance with  rolling walker and bedside commode  · Patient completed  Shower Transfer Stand Pivot technique with stand by assistance with rolling walker and shower chair  · Functional Mobility: Pt was able to walk to bathroom c SBA and RW.    Activities of Daily Living:  · Grooming: modified independence to brush teeth while standing at sink.  · Upper Body Dressing: modified independence to don shirt.  · Lower Body Dressing: modified independence to don pants, socks, and shoes c reacher, sock-aid, and long handled shoe horn.    Patient left up in chair with all lines intact, call button in reach, RN notified and family present    [facility] 6 Click:  [facility] Total Score: 23    Treatment & Education:  Educated pt on bathing and car T/F's.  Education:    Assessment:     Pierre Grossman is a 58 y.o.  male with a medical diagnosis of Primary osteoarthritis of right hip.  He was able to perform supine/sit, sit/stand, bed/chair, BSC, and shower chair T/F c SBA and RW.  Able to perform UB/LB dressing and grooming c mod I and AE.  Was able to walk to bathroom c SBA and RW.  Pt was able to state 3/3 MELISSA precautions correctly.  Performance deficits affecting function are impaired self care skills, impaired functional mobilty, orthopedic precautions.      Rehab Prognosis:  Good; patient would benefit from acute skilled OT services to address these deficits and reach maximum level of function.       Plan:     Patient to be seen daily to address the above listed problems via self-care/home management, therapeutic activities, therapeutic exercises  · Plan of Care Expires:    · Plan of Care Reviewed with: patient, spouse    This Plan of care has been discussed with the patient who was involved in its development and understands and is in agreement with the identified goals and treatment plan    GOALS:   Multidisciplinary Problems     Occupational Therapy Goals        Problem: Occupational Therapy Goal    Goal Priority Disciplines Outcome Interventions   Occupational Therapy Goal     OT, PT/OT Ongoing (interventions implemented as appropriate)    Description:  Goals to be met by: 7 days    Patient will increase functional independence with ADLs by performing:    UE Dressing with Supervision.  LE Dressing with Supervision with AD as needed.  Grooming while standing with Supervision.  Toileting from bedside commode with Supervision for hygiene and clothing management.   Stand pivot transfers with Supervision.  Toilet transfer to bedside commode with Supervision.                         Time Tracking:     OT Date of Treatment: 10/04/18  OT Start Time: 0700  OT Stop Time: 0746  OT Total Time (min): 46 min    Billable Minutes:Self Care/Home Management 38  Therapeutic Activity 8    ZACHARY Syed  10/4/2018

## 2018-10-04 NOTE — PROGRESS NOTES
"[facility]  Orthopedics  Progress Note    Patient Name: Pierre Grossman  MRN: [id number]  Admission Date: 10/3/2018  Hospital Length of Stay: 1 days  Attending Provider: [facility] MD  Primary Care Provider: Socorro Payton MD  Follow-up For: Procedure(s) (LRB):  REPLACEMENT-HIP-TOTAL WITH NAVIGATION (Right)    Post-Operative Day: 1 Day Post-Op  Subjective:     Principal Problem:Primary osteoarthritis of right hip    Principal Orthopedic Problem: Same    Interval History: Patient seen and examined at bedside.  No acute events overnight.  Pain controlled.       Review of patient's allergies indicates:   Allergen Reactions    Iodinated contrast- oral and iv dye Shortness Of Breath and Swelling       Current Facility-Administered Medications   Medication    0.9%  NaCl infusion    acetaminophen tablet 1,000 mg    aspirin EC tablet 81 mg    bisacodyl suppository 10 mg    celecoxib capsule 200 mg    famotidine tablet 20 mg    meclizine tablet 25 mg    morphine injection 2 mg    morphine injection 2 mg    mupirocin 2 % ointment 1 g    naloxone 0.4 mg/mL injection 0.02 mg    ondansetron injection 4 mg    oxyCODONE immediate release tablet 5 mg    oxyCODONE immediate release tablet Tab 10 mg    polyethylene glycol packet 17 g    pregabalin capsule 75 mg    promethazine (PHENERGAN) 6.25 mg in dextrose 5 % 50 mL IVPB    ramelteon tablet 8 mg    senna-docusate 8.6-50 mg per tablet 1 tablet    tamsulosin 24 hr capsule 0.4 mg     Objective:     Vital Signs (Most Recent):  Temp: 97.8 °F (36.6 °C) (10/04/18 0748)  Pulse: 71 (10/04/18 0748)  Resp: 16 (10/04/18 0748)  BP: 118/63 (10/04/18 0748)  SpO2: 95 % (10/04/18 0748) Vital Signs (24h Range):  Temp:  [95.9 °F (35.5 °C)-98 °F (36.7 °C)] 97.8 °F (36.6 °C)  Pulse:  [51-75] 71  Resp:  [11-20] 16  SpO2:  [90 %-100 %] 95 %  BP: ()/(54-82) 118/63     Weight: 93.9 kg (207 lb)  Height: 5' 10" (177.8 cm)  Body mass index is 29.7 " kg/m².      Intake/Output Summary (Last 24 hours) at 10/4/2018 0813  Last data filed at 10/4/2018 0544  Gross per 24 hour   Intake 4229 ml   Output 2200 ml   Net 2029 ml       Ortho/SPM Exam     AAOx4  NAD  RRR  No increased WOB  Aquacel c/d/i  SILT and motor intact T/SP/DP  WWP extremities  FCDs in place and functioning      Significant Labs: All pertinent labs within the past 24 hours have been reviewed.    Significant Imaging: I have reviewed and interpreted all pertinent imaging results/findings.    Assessment/Plan:     * Primary osteoarthritis of right hip    58 y.o. male POD1 s/p R MELISSA    Pain control: per APS  PT/OT: WBAT RLE, posterior hip precautions  DVT PPx: ASA  BID, FCDs at all times when not ambulating  Abx: postop Ancef    Dispo: f/u PT recs                  Semaj Elam MD  Orthopedics  [facility]

## 2018-10-04 NOTE — PLAN OF CARE
Problem: Occupational Therapy Goal  Goal: Occupational Therapy Goal  Goals to be met by: 7 days    Patient will increase functional independence with ADLs by performing:    UE Dressing with Supervision.  LE Dressing with Supervision with AD as needed.  Grooming while standing with Supervision.  Toileting from bedside commode with Supervision for hygiene and clothing management.   Stand pivot transfers with Supervision.  Toilet transfer to bedside commode with Supervision.        Cont. POC.

## 2018-10-04 NOTE — PT/OT/SLP DISCHARGE
Physical Therapy Discharge Summary    Name: Pierre Grossman  MRN: [id number]   Principal Problem: Primary osteoarthritis of right hip     Patient Discharged from acute Physical Therapy on 10/4/18.  Please refer to prior PT noted date on 10/4/18 for functional status.     Assessment:     Patient has met all goals and is not appropriate for therapy.    Objective:     GOALS:   Multidisciplinary Problems     Physical Therapy Goals     Not on file          Multidisciplinary Problems (Resolved)        Problem: Physical Therapy Goal    Goal Priority Disciplines Outcome Goal Variances Interventions   Physical Therapy Goal   (Resolved)     PT, PT/OT Outcome(s) achieved     Description:  Goals to be met by: 10/10/18     Patient will increase functional independence with mobility by performin. Supine to sit with Set-up Butler  2. Sit to supine with Set-up Butler  3. Sit to stand transfer with Supervision  4. Bed to chair transfer with Stand-by Assistance using Rolling Walker  5. Gait  x 100 feet with Stand-by Assistance using Rolling Walker.   6. Ascend/descend 4 stair with (R) Handrails Contact Guard Assistance.   7. Ascend/Descend 7 inch curb step with Contact Guard Assistance using Rolling Walker.  8. Lower extremity exercise program x20-30 reps per handout, with independence                        Reasons for Discontinuation of Therapy Services  Satisfactory goal achievement.      Plan:     Patient Discharged to: Home with Home Health Service.    Jose Briceno, PT  10/4/2018

## 2018-10-04 NOTE — ANESTHESIA POST-OP PAIN MANAGEMENT
Acute Pain Service and Perioperative Surgical Home Progress Note    Interval history  Pierre Grossman is a 58 y.o., male with no significant medical issues.    Surgery:  R total hip    Post Op Day #: 1    Catheter type: epidural L3/L4    Infusion type: Ropivacaine 0.1% with Fentanyl 5mcg/mL  6 basal, 4 demand, 10 minute lockout and 30 1 hour limit    Problem List:    Active Hospital Problems    Diagnosis  POA    Primary osteoarthritis of right hip [M16.11]  Yes      Resolved Hospital Problems   No resolved problems to display.       Subjective:    Some complaints of nausea. Pain otherwise controlled and progressing well with PT/OT.     General appearance of alert, oriented, no complaints   Pain with rest: 1    Numbers   Pain with movement: 4    Numbers   Side Effects    1. Pruritis No    2. Nausea Yes    3. Motor Blockade No, 0=Ability to raise lower extremities off bed    4. Sedation No, 1=awake and alert    Schedule Medications:    acetaminophen  1,000 mg Oral Q6H    aspirin  81 mg Oral BID    celecoxib  200 mg Oral Daily    famotidine  20 mg Oral BID    mupirocin  1 g Nasal BID    polyethylene glycol  17 g Oral Daily    pregabalin  75 mg Oral QHS    senna-docusate 8.6-50 mg  1 tablet Oral BID    tamsulosin  0.4 mg Oral Daily        Continuous Infusions:   sodium chloride 0.9% 150 mL/hr at 10/03/18 1220    fentaNYL 5 mcg/mL / ROPIvacaine 0.1% in 250mL 0.9% Sodium Chloride 250mL premixed Bag 6 mL/hr (10/03/18 1229)        PRN Medications:  bisacodyl, meclizine, morphine, morphine, naloxone, ondansetron, oxyCODONE, oxyCODONE, promethazine (PHENERGAN) IVPB, ramelteon       Antibiotics:  Antibiotics (From admission, onward)    Start     Stop Route Frequency Ordered    10/03/18 2337  mupirocin 2 % ointment 1 g      10/08 2059 Nasl 2 times daily 10/03/18 2337             Objective:     Catheter site clean, dry, intact       Vital Signs (Most Recent):  Temp: 36.6 °C (97.8 °F) (10/04/18 0748)  Pulse: 71  (10/04/18 0748)  Resp: 16 (10/04/18 0748)  BP: 118/63 (10/04/18 0748)  SpO2: 95 % (10/04/18 0748) Vital Signs Range (Last 24H):  Temp:  [35.5 °C (95.9 °F)-36.7 °C (98 °F)]   Pulse:  [51-75]   Resp:  [11-20]   BP: ()/(54-82)   SpO2:  [90 %-100 %]          I & O (Last 24H):    Intake/Output Summary (Last 24 hours) at 10/4/2018 0750  Last data filed at 10/4/2018 0200  Gross per 24 hour   Intake 4229 ml   Output 1975 ml   Net 2254 ml       Physical Exam:    GA: Alert, comfortable, no acute distress.   Pulmonary: Clear to auscultation A/P/L. No wheezing, crackles, or rhonchi.  Cardiac: RRR S1 & S2 w/o rubs/murmurs/gallops.   Abdominal:Bowel sounds present. No tenderness to palpation or distension. No appreciable hepatosplenomegaly.   Skin: No jaundice, rashes, or visible lesions.         Laboratory:  CBC:   Recent Labs      10/01/18   1102  10/03/18   1216  10/04/18   0406   WBC  8.77  9.92   --    RBC  5.44  4.80   --    HGB  15.3  13.6*  11.5*   HCT  47.6  42.0   --    PLT  287  203   --    MCV  88  88   --    MCH  28.1  28.3   --    MCHC  32.1  32.4   --        BMP:   Recent Labs      10/03/18   1216   NA  139   K  5.2*   CO2  22*   CL  108   BUN  12   CREATININE  0.9   GLU  107   CALCIUM  8.4*       No results for input(s): PT, INR, PROTIME, APTT in the last 72 hours.      Anticoagulant: ASA 81 mg PO BID    Assessment:         Pain control adequate    Plan:     1) Pain: Epidural discontinued per protocol, tip intact. PO medication given before discontinuation of epidural.  Will continue to monitor.   2) FEN/GI: Tolerating liquids, advance diet as tolerated    3) Dispo: Pt working well with PT/OT. Case management and SW for placement. Plan for D/C today but if not, tomorrow morning          Evaluator Carter Mane MD  PGY-2/CA-1  PSH/APS  [id number]  I have seen the patient, reviewed the Resident's assessment and plan. I have personally interviewed and examined the patient at bedside and: agree with the  findings.   Pain well controlled after PCEA d/c  Continue Multimodals for analgesia  Tolerating po well  PT/OT goals being met  D/C home today

## 2018-10-04 NOTE — PLAN OF CARE
"Problem: Physical Therapy Goal  Goal: Physical Therapy Goal  Goals to be met by: 10/10/18     Patient will increase functional independence with mobility by performin. Supine to sit with Set-up Braxton  2. Sit to supine with Set-up Braxton  3. Sit to stand transfer with Supervision  4. Bed to chair transfer with Stand-by Assistance using Rolling Walker  5. Gait  x 100 feet with Stand-by Assistance using Rolling Walker.   6. Ascend/descend 4 stair with (R) Handrails Contact Guard Assistance.   7. Ascend/Descend 7 inch curb step with Contact Guard Assistance using Rolling Walker.  8. Lower extremity exercise program x20-30 reps per handout, with independence       Outcome: Outcome(s) achieved Date Met: 10/04/18  Pt tolerated session well today with no complications and demonstrated appropriate mobility s/p (R) MELISSA. Pt with no LOB during ambulation using RW for support. Pt able to follow 3-point gait sequencing well with no complications. Pt able to step up/down on a 6" curb step with no instability noted. Pt reported no pain with supine exercises. Pt verbalized understanding of car t/f technique and is safe to d/c home with HHPT at this time.        "

## 2018-10-04 NOTE — PLAN OF CARE
Problem: Patient Care Overview  Goal: Plan of Care Review  Outcome: Ongoing (interventions implemented as appropriate)  Pt AAO x 4 states pain 2/10 managed with oral medication, Pt has worked with PT and OT PNC is out and given pain meds. Pt has ambulated to bathroom and in mabry with a walker. Call light in reach.

## 2018-10-04 NOTE — PLAN OF CARE
POD 1 s/p R MELISSA. PT/OT ordered to eval and treat. PT/OT recommended HH and DME. Patient currently lives with his spouse. Patient's spouse is present at the bedside. Patient has good family support. CM completed discharge assessment and planning with patient and family. Patient and family verbalized understanding. All questions and concerns addressed. SW and CM will continue to follow for any additional needs. Plan A to discharge home with home health as soon as medically stable. Plan B to discharge home with family support and plans for outpatient rehab.    Patient requested [facility].    PCP: Socorro Payton MD    Pharmacy:   [facility] [address]  [address]  [facility] [address]  Phone: [phone] Fax: [phone]    Payor: BLUE CROSS BLUE SHIELD / Plan: [place] / Product Type: HMO /      10/04/18 0835   Discharge Assessment   Assessment Type Discharge Planning Assessment   Confirmed/corrected address and phone number on facesheet? Yes   Assessment information obtained from? Patient;Caregiver;Medical Record   Expected Length of Stay (days) 2   Communicated expected length of stay with patient/caregiver yes   Prior to hospitilization cognitive status: Alert/Oriented   Prior to hospitalization functional status: Independent   Current cognitive status: Alert/Oriented   Current Functional Status: Assistive Equipment   Lives With spouse   Able to Return to Prior Arrangements yes   Is patient able to care for self after discharge? Yes   Who are your caregiver(s) and their phone number(s)? spouse- Eben Grossman [phone]   Patient's perception of discharge disposition home health   Readmission Within The Last 30 Days no previous admission in last 30 days   Patient currently being followed by outpatient case management? No   Patient currently receives any other outside agency services? No   Equipment Currently Used at Home bedside commode;shower chair;walker,  rolling;hip kit   Do you have any problems affording any of your prescribed medications? No   Is the patient taking medications as prescribed? yes   Does the patient have transportation home? Yes   Transportation Available family or friend will provide   Does the patient receive services at the [facility]? No   Discharge Plan A Home Health;Home with family   Discharge Plan B Home with family;Other  (outpatient rehab)   Patient/Family In Agreement With Plan yes

## 2018-10-04 NOTE — PLAN OF CARE
POD 1 s/p R MELISSA. PT/OT recommended HH and DME. Patient owns all necessary DME. Patient set up with [facility] . Patient's spouse is present at the bedside. Patient and family verbalized understanding. All questions and concerns addressed. SW and CM will continue to follow for any additional needs. Discharge and follow-up instructions to be completed by the bedside nurse.  Future Appointments   Date Time Provider Department Center   10/16/2018  3:00 PM Dejah Martínez PA-C [facility]      10/04/18 1211   Final Note   Assessment Type Final Discharge Note   Discharge Disposition Home-Health  ([facility] )   What phone number can be called within the next 1-3 days to see how you are doing after discharge? ([phone])   Hospital Follow Up  Appt(s) scheduled? Yes   Discharge plans and expectations educations in teach back method with documentation complete? Yes

## 2018-10-04 NOTE — NURSING
Pt DC IV's taken out without issues. Rx picked up from pharmacy, Equipment at bedside. DC instructions given verbalized understanding.

## 2018-10-04 NOTE — ASSESSMENT & PLAN NOTE
58 y.o. male POD1 s/p R MELISSA    Pain control: per APS  PT/OT: WBAT RLE, posterior hip precautions  DVT PPx: ASA  BID, FCDs at all times when not ambulating  Abx: postop Ancef    Dispo: f/u PT recs

## 2018-10-05 ENCOUNTER — TELEPHONE (OUTPATIENT)
Dept: ORTHOPEDICS | Facility: CLINIC | Age: 58
End: 2018-10-05

## 2018-10-05 NOTE — DISCHARGE SUMMARY
[facility]  Orthopedics  Discharge Summary      Patient Name: Pierre Grossman  MRN: [id number]  Admission Date: 10/3/2018  Hospital Length of Stay: 1 days  Discharge Date and Time: 10/4/2018 12:45 PM  Attending Physician: No att. providers found   Discharging Provider: Semaj Elam MD  Primary Care Provider: Socorro Payton MD    HPI: Pierre Grossman is a 58 y.o. male with 2 year history of Right hip pain.  Pain is worse with activity and weight bearing.  Patient has experienced interference of activities of daily living due to increased pain and decreased range of motion. Patient has failed non-operative treatment including NSAIDs, as well as greater than 3 months of activity modification. Pierre Grossman ambulates independently.         Procedure(s) (LRB):  REPLACEMENT-HIP-TOTAL WITH NAVIGATION (Right)      Hospital Course: Patient presented for above procedure.  Tolerated it well and was discharged home POD1 after voiding, tolerating diet, ambulating, pain controlled.  Patient was informed about signs and symptoms of compartment syndrome and if any of these should present then he should immediately call us back and come to the ED.  Discharge instructions, follow-up appointment, and med rec are below.          Significant Diagnostic Studies: Labs:   CBC   Recent Labs   Lab  10/03/18   1216  10/04/18   0406   WBC  9.92   --    HGB  13.6*  11.5*   HCT  42.0   --    PLT  203   --        Pending Diagnostic Studies:     None        Final Active Diagnoses:    Diagnosis Date Noted POA    PRINCIPAL PROBLEM:  Primary osteoarthritis of right hip [M16.11] 10/03/2018 Yes      Problems Resolved During this Admission:      Discharged Condition: good    Disposition: Home-Health Care [facility]    Follow Up:    Patient Instructions:   No discharge procedures on file.  Medications:  Reconciled Home Medications:      Medication List      START taking these medications    aspirin 81 MG EC tablet  Commonly known as:   ECOTRIN  Take 1 tablet (81 mg total) by mouth 2 (two) times daily.     ondansetron 8 MG Tbdl  Commonly known as:  ZOFRAN-ODT  Dissolve 1 tablet (8 mg total) by mouth every 12 (twelve) hours as needed (nausea).     oxyCODONE-acetaminophen 5-325 mg per tablet  Commonly known as:  PERCOCET  Take 1 tablet by mouth every 4 to 6 hours as needed for Pain.     STOOL SOFTENER 100 MG capsule  Generic drug:  docusate sodium  Take 1 capsule (100 mg total) by mouth 2 (two) times daily as needed for Constipation.        ASK your doctor about these medications    BC ARTHRITIS ORAL  Take 1 tablet by mouth as needed.     diclofenac 50 MG EC tablet  Commonly known as:  VOLTAREN  TAKE ONE TABLET BY MOUTH TWICE DAILY AS NEEDED FOR INFLAMMATION and/or FOR PAIN.     ibuprofen 200 MG tablet  Commonly known as:  ADVIL,MOTRIN  Take 200 mg by mouth every 6 (six) hours as needed for Pain.     meclizine 25 mg tablet  Commonly known as:  ANTIVERT  Take 1 tablet (25 mg total) by mouth 3 (three) times daily as needed.     meloxicam 7.5 MG tablet  Commonly known as:  MOBIC  Take 7.5 mg by mouth once daily.     triamcinolone acetonide 0.1% 0.1 % cream  Commonly known as:  KENALOG  Apply topically 2 (two) times daily.            Semaj Elam MD  Orthopedics  [facility]

## 2018-10-05 NOTE — PLAN OF CARE
[facility]    HOME HEALTH ORDERS  FACE TO FACE ENCOUNTER    Patient Name: Pierre Grossman  YOB: 1960    PCP: Socorro Payton MD   PCP Address: [address] [address]  PCP Phone Number: [phone]  PCP Fax: [phone]    Encounter Date: 10/05/2018    Admit to Home Health    Diagnoses:  Active Hospital Problems    Diagnosis  POA    *Primary osteoarthritis of right hip [M16.11]  Yes      Resolved Hospital Problems   No resolved problems to display.       Future Appointments   Date Time Provider Department Center   10/16/2018  3:00 PM Dejah Martínez PA-C [facility] [place]           I have seen and examined this patient face to face today. My clinical findings that support the need for the home health skilled services and home bound status are the following:  Weakness/numbness causing balance and gait disturbance due to Joint Replacement making it taxing to leave home.    Allergies:  Review of patient's allergies indicates:   Allergen Reactions    Iodinated contrast- oral and iv dye Shortness Of Breath and Swelling       Diet: regular diet    Activities: activity as tolerated    Home Health Admitting Clinician:   SN/PT to complete comprehensive assessment including routine vital signs. Instruct on disease process and s/s of complications to report to MD. Follow specific home health arthoplasty protocol. Review/verify medication list sent home with the patient at time of discharge  and instruct patient/caregiver as needed. If coumadin ordered, coumadin clinic to manage INR with INR draws 2x per week with a goal to maintain INR between 1.8 and 2.2. Frequency may be adjusted depending on start of care date.    Notify MD if SBP > 160 or < 90; DBP > 90 or < 50; HR > 120 or < 50; Temp > 101    Home Medical Equipment:  Walker, 3-1 bedside commode, transfer tub bench    CONSULTS:    Physical Therapy may admit if patient not on coumadin, PT to perform  comprehensive assessment if performing admit visit and generate therapy plan of care. Evaluate for home safety and equipment needs; Establish/upgrade home exercise program. Perform/instruct on therapeutic exercises, gait training, transfer training, and Range of Motion.      OTHER: (only select if patient needs other therapy disciplines)  Occupational Therapy to evaluate and treat. Evaluate home environment for safety and equipment needs. Perform/Instruct on transfers, ADL training, ROM, and therapeutic exercises.    MISCELLANEOUS CARE:  Routine Skin for Bedridden Patients: Instruct patient/caregiver to apply moisture barrier cream to all skin folds and wet areas in perineal area daily and after baths and all bowel movements.    WOUND CARE ORDERS:  Assess Surgical Incision/DSRG each TX  Aquacel AG drsg applied post-op leave on 14 days post op. Call MD if any drainage reaches border to border of drsg horizontally, s/s of infection, temp >101, induration, swelling or redness.  If dressing is removed per MD order, then apply island dressing, change/teach caregiver to perform daily dressing change if island dressing present.    Medications: Review discharge medications with patient and family and provide education.      Discharge Medication List as of 10/4/2018 11:49 AM      START taking these medications    Details   aspirin (ECOTRIN) 81 MG EC tablet Take 1 tablet (81 mg total) by mouth 2 (two) times daily., Starting Wed 10/3/2018, Until Sat 11/3/2018, Print      docusate sodium (COLACE) 100 MG capsule Take 1 capsule (100 mg total) by mouth 2 (two) times daily as needed for Constipation., Starting Wed 10/3/2018, Print      ondansetron (ZOFRAN-ODT) 8 MG TbDL Dissolve 1 tablet (8 mg total) by mouth every 12 (twelve) hours as needed (nausea)., Starting Wed 10/3/2018, Print      oxyCODONE-acetaminophen (PERCOCET) 5-325 mg per tablet Take 1 tablet by mouth every 4 to 6 hours as needed for Pain., Starting Wed 10/3/2018,  Print         CONTINUE these medications which have NOT CHANGED    Details   aspirin/caffeine (BC ARTHRITIS ORAL) Take 1 tablet by mouth as needed., Historical Med      diclofenac (VOLTAREN) 50 MG EC tablet TAKE ONE TABLET BY MOUTH TWICE DAILY AS NEEDED FOR INFLAMMATION and/or FOR PAIN., Normal      ibuprofen (ADVIL,MOTRIN) 200 MG tablet Take 200 mg by mouth every 6 (six) hours as needed for Pain., Historical Med      meclizine (ANTIVERT) 25 mg tablet Take 1 tablet (25 mg total) by mouth 3 (three) times daily as needed., Starting Thu 5/3/2018, Normal      meloxicam (MOBIC) 7.5 MG tablet Take 7.5 mg by mouth once daily., Historical Med      triamcinolone acetonide 0.1% (KENALOG) 0.1 % cream Apply topically 2 (two) times daily., Starting Thu 5/3/2018, Until Wed 10/3/2018, Normal             I certify that this patient is confined to his home and needs intermittent skilled nursing care, physical therapy and occupational therapy.

## 2018-10-05 NOTE — TELEPHONE ENCOUNTER
----- Message from Rhianna Long sent at 10/5/2018 10:35 AM CDT -----  Contact: Gracia @ [facility]  Gracia has questions in regards to patients medications and which ones he should keep taking and which he can start taking again.  [phone]      We spoke  Per his discharge record he can resume 4 different  Anti-inflammatory drugs  I told him to pick one    Not to take them all     He voiced understanding

## 2018-10-16 ENCOUNTER — OFFICE VISIT (OUTPATIENT)
Dept: ORTHOPEDICS | Facility: CLINIC | Age: 58
End: 2018-10-16
Payer: COMMERCIAL

## 2018-10-16 VITALS
WEIGHT: 205.13 LBS | HEART RATE: 4 BPM | BODY MASS INDEX: 29.37 KG/M2 | DIASTOLIC BLOOD PRESSURE: 73 MMHG | SYSTOLIC BLOOD PRESSURE: 115 MMHG | HEIGHT: 70 IN

## 2018-10-16 DIAGNOSIS — Z96.641 STATUS POST TOTAL HIP REPLACEMENT, RIGHT: Primary | ICD-10-CM

## 2018-10-16 PROCEDURE — 99024 POSTOP FOLLOW-UP VISIT: CPT | Mod: S$GLB,,, | Performed by: PHYSICIAN ASSISTANT

## 2018-10-16 PROCEDURE — 99999 PR PBB SHADOW E&M-EST. PATIENT-LVL III: CPT | Mod: PBBFAC,,, | Performed by: PHYSICIAN ASSISTANT

## 2018-10-16 RX ORDER — HYDROCODONE BITARTRATE AND ACETAMINOPHEN 10; 325 MG/1; MG/1
1 TABLET ORAL EVERY 6 HOURS PRN
Qty: 40 TABLET | Refills: 0 | Status: SHIPPED | OUTPATIENT
Start: 2018-10-16 | End: 2018-10-26

## 2018-10-17 ENCOUNTER — TELEPHONE (OUTPATIENT)
Dept: ADMINISTRATIVE | Facility: CLINIC | Age: 58
End: 2018-10-17

## 2018-10-17 NOTE — PROGRESS NOTES
"Pierre Grossman presents for initial post-operative visit following a right total hip arthroplasty performed by Dr. [facility] on 10/3/2018.  Tolerating pain medication well.     Exam:  Blood pressure 115/73, pulse (!) 4, height 5' 10" (1.778 m), weight 93.1 kg (205 lb 2.2 oz).   Patient is ambulating well with walker.   Incision is clean and dry without drainage or erythema.      Initial post-operative radiographs reviewed today revealing satisfactory position of the prosthesis.    A/P  2 weeks s/p right total hip replacement  Dr. [facility] interviewed and examined patient today and agrees with plan.   - Continue hip precautions  - Patient advised to keep the incision clean and dry for the next 24 hours after which he may wash the area with antibacterial soap in the shower, but is advised not to submerge until the incision is completely healed.  - Continue ASA for 1 month from surgery.  - Pain medication refilled.  - Follow up in 4 weeks with Dr. [facility]. Pt will call clinic immediately with problems/concerns.     "

## 2018-10-17 NOTE — TELEPHONE ENCOUNTER
Home Health SOC 10/05/2018 - 12/03/2018 with [facility] - Dr. [facility] Patient received SN and PT services.

## 2018-10-19 ENCOUNTER — TELEPHONE (OUTPATIENT)
Dept: ORTHOPEDICS | Facility: CLINIC | Age: 58
End: 2018-10-19

## 2018-10-19 DIAGNOSIS — Z96.641 HISTORY OF TOTAL HIP REPLACEMENT, RIGHT: Primary | ICD-10-CM

## 2018-10-19 NOTE — TELEPHONE ENCOUNTER
----- Message from Charlette Cerda sent at 10/19/2018 12:24 PM CDT -----  Contact: Tk [facility]  Stating that pt will be discharge from PT today. Meet all of his goals and no longer home bound and would like a referral to Outpatient therapy.Pt will call back with information for facility. [facility] located at [address] [place] [address] [place] [address] phone # [phone] and fax,[phone]. Tk could be reached at [phone]     Done via Epic

## 2018-11-13 ENCOUNTER — OFFICE VISIT (OUTPATIENT)
Dept: ORTHOPEDICS | Facility: CLINIC | Age: 58
End: 2018-11-13
Payer: COMMERCIAL

## 2018-11-13 VITALS
HEIGHT: 70 IN | DIASTOLIC BLOOD PRESSURE: 99 MMHG | HEART RATE: 69 BPM | SYSTOLIC BLOOD PRESSURE: 164 MMHG | WEIGHT: 205 LBS | BODY MASS INDEX: 29.35 KG/M2

## 2018-11-13 DIAGNOSIS — Z96.641 STATUS POST TOTAL HIP REPLACEMENT, RIGHT: Primary | ICD-10-CM

## 2018-11-13 PROCEDURE — 99999 PR PBB SHADOW E&M-EST. PATIENT-LVL III: CPT | Mod: PBBFAC,,, | Performed by: ORTHOPAEDIC SURGERY

## 2018-11-13 PROCEDURE — 99024 POSTOP FOLLOW-UP VISIT: CPT | Mod: S$GLB,,, | Performed by: ORTHOPAEDIC SURGERY

## 2018-11-13 NOTE — PROGRESS NOTES
Pierre Grossman is status post MELISSA  10/3/18. Wound is well healed. Xrays reviewed.    Plan, Follow up in 6wks.

## 2019-01-17 ENCOUNTER — OFFICE VISIT (OUTPATIENT)
Dept: ORTHOPEDICS | Facility: CLINIC | Age: 59
End: 2019-01-17
Payer: COMMERCIAL

## 2019-01-17 VITALS
SYSTOLIC BLOOD PRESSURE: 145 MMHG | HEART RATE: 66 BPM | BODY MASS INDEX: 30.29 KG/M2 | DIASTOLIC BLOOD PRESSURE: 87 MMHG | WEIGHT: 211.06 LBS

## 2019-01-17 DIAGNOSIS — Z96.641 STATUS POST TOTAL HIP REPLACEMENT, RIGHT: Primary | ICD-10-CM

## 2019-01-17 PROCEDURE — 99024 POSTOP FOLLOW-UP VISIT: CPT | Mod: S$GLB,,, | Performed by: ORTHOPAEDIC SURGERY

## 2019-01-17 PROCEDURE — 99024 PR POST-OP FOLLOW-UP VISIT: ICD-10-PCS | Mod: S$GLB,,, | Performed by: ORTHOPAEDIC SURGERY

## 2019-01-17 PROCEDURE — 99999 PR PBB SHADOW E&M-EST. PATIENT-LVL III: ICD-10-PCS | Mod: PBBFAC,,, | Performed by: ORTHOPAEDIC SURGERY

## 2019-01-17 PROCEDURE — 99999 PR PBB SHADOW E&M-EST. PATIENT-LVL III: CPT | Mod: PBBFAC,,, | Performed by: ORTHOPAEDIC SURGERY

## 2019-01-17 NOTE — PROGRESS NOTES
Pierre Chauncey is status post 10/3/18. Patient progressing well.   Plan, return in one year with xrays.

## 2019-04-12 ENCOUNTER — TELEPHONE (OUTPATIENT)
Dept: PULMONOLOGY | Facility: CLINIC | Age: 59
End: 2019-04-12

## 2019-04-12 NOTE — TELEPHONE ENCOUNTER
----- Message from Leticia Nichole sent at 4/12/2019  8:44 AM CDT -----  [facility]  Type: [facility]ill Request    Who Called: Ursula ([facility])    Refill or New Rx: Refill    RX Name and Strength: CPAP supplies    How is the patient currently taking it? (ex. 1XDay):    Is this a 30 day or 90 day RX:    Preferred Pharmacy with phone number: [facility]    Local or Mail Order:Mail    Ordering Provider:Guerline Del Valle    Would the patient rather a call back or a response via My [facility]? Call back    Best Call Back Number:[phone]    Additional Information: Ursula called check the status of CPAP supply orders being faxed over to the office.

## 2020-01-20 ENCOUNTER — TELEPHONE (OUTPATIENT)
Dept: FAMILY MEDICINE | Facility: CLINIC | Age: 60
End: 2020-01-20

## 2020-01-20 ENCOUNTER — LAB VISIT (OUTPATIENT)
Dept: LAB | Facility: HOSPITAL | Age: 60
End: 2020-01-20
Attending: FAMILY MEDICINE
Payer: COMMERCIAL

## 2020-01-20 ENCOUNTER — OFFICE VISIT (OUTPATIENT)
Dept: FAMILY MEDICINE | Facility: CLINIC | Age: 60
End: 2020-01-20
Payer: COMMERCIAL

## 2020-01-20 VITALS
TEMPERATURE: 98 F | BODY MASS INDEX: 30.02 KG/M2 | HEART RATE: 68 BPM | RESPIRATION RATE: 18 BRPM | WEIGHT: 209.69 LBS | DIASTOLIC BLOOD PRESSURE: 80 MMHG | HEIGHT: 70 IN | OXYGEN SATURATION: 97 % | SYSTOLIC BLOOD PRESSURE: 126 MMHG

## 2020-01-20 DIAGNOSIS — Z00.00 ANNUAL PHYSICAL EXAM: Primary | ICD-10-CM

## 2020-01-20 DIAGNOSIS — M16.12 ARTHRITIS OF LEFT HIP: ICD-10-CM

## 2020-01-20 DIAGNOSIS — N20.0 NEPHROLITHIASIS: ICD-10-CM

## 2020-01-20 DIAGNOSIS — Z13.6 ENCOUNTER FOR LIPID SCREENING FOR CARDIOVASCULAR DISEASE: ICD-10-CM

## 2020-01-20 DIAGNOSIS — Z13.220 ENCOUNTER FOR LIPID SCREENING FOR CARDIOVASCULAR DISEASE: ICD-10-CM

## 2020-01-20 DIAGNOSIS — Z00.00 ANNUAL PHYSICAL EXAM: ICD-10-CM

## 2020-01-20 DIAGNOSIS — M16.12 ARTHRITIS OF LEFT HIP: Primary | ICD-10-CM

## 2020-01-20 LAB
25(OH)D3+25(OH)D2 SERPL-MCNC: 28 NG/ML (ref 30–96)
ALBUMIN SERPL BCP-MCNC: 3.7 G/DL (ref 3.5–5.2)
ALP SERPL-CCNC: 91 U/L (ref 55–135)
ALT SERPL W/O P-5'-P-CCNC: 37 U/L (ref 10–44)
ANION GAP SERPL CALC-SCNC: 7 MMOL/L (ref 8–16)
AST SERPL-CCNC: 24 U/L (ref 10–40)
BASOPHILS # BLD AUTO: 0.09 K/UL (ref 0–0.2)
BASOPHILS NFR BLD: 0.8 % (ref 0–1.9)
BILIRUB SERPL-MCNC: 0.5 MG/DL (ref 0.1–1)
BUN SERPL-MCNC: 14 MG/DL (ref 6–20)
CALCIUM SERPL-MCNC: 9.8 MG/DL (ref 8.7–10.5)
CHLORIDE SERPL-SCNC: 107 MMOL/L (ref 95–110)
CHOLEST SERPL-MCNC: 244 MG/DL (ref 120–199)
CHOLEST/HDLC SERPL: 5.4 {RATIO} (ref 2–5)
CO2 SERPL-SCNC: 25 MMOL/L (ref 23–29)
COMPLEXED PSA SERPL-MCNC: 1.8 NG/ML (ref 0–4)
CREAT SERPL-MCNC: 1.3 MG/DL (ref 0.5–1.4)
DIFFERENTIAL METHOD: ABNORMAL
EOSINOPHIL # BLD AUTO: 0.7 K/UL (ref 0–0.5)
EOSINOPHIL NFR BLD: 5.9 % (ref 0–8)
ERYTHROCYTE [DISTWIDTH] IN BLOOD BY AUTOMATED COUNT: 12.9 % (ref 11.5–14.5)
EST. GFR  (AFRICAN AMERICAN): >60 ML/MIN/1.73 M^2
EST. GFR  (NON AFRICAN AMERICAN): 60 ML/MIN/1.73 M^2
ESTIMATED AVG GLUCOSE: 100 MG/DL (ref 68–131)
GLUCOSE SERPL-MCNC: 87 MG/DL (ref 70–110)
HBA1C MFR BLD HPLC: 5.1 % (ref 4–5.6)
HCT VFR BLD AUTO: 48.4 % (ref 40–54)
HDLC SERPL-MCNC: 45 MG/DL (ref 40–75)
HDLC SERPL: 18.4 % (ref 20–50)
HGB BLD-MCNC: 15.8 G/DL (ref 14–18)
IMM GRANULOCYTES # BLD AUTO: 0.04 K/UL (ref 0–0.04)
IMM GRANULOCYTES NFR BLD AUTO: 0.4 % (ref 0–0.5)
LDLC SERPL CALC-MCNC: 180 MG/DL (ref 63–159)
LYMPHOCYTES # BLD AUTO: 2.2 K/UL (ref 1–4.8)
LYMPHOCYTES NFR BLD: 19.9 % (ref 18–48)
MCH RBC QN AUTO: 28.1 PG (ref 27–31)
MCHC RBC AUTO-ENTMCNC: 32.6 G/DL (ref 32–36)
MCV RBC AUTO: 86 FL (ref 82–98)
MONOCYTES # BLD AUTO: 1.1 K/UL (ref 0.3–1)
MONOCYTES NFR BLD: 9.6 % (ref 4–15)
NEUTROPHILS # BLD AUTO: 7 K/UL (ref 1.8–7.7)
NEUTROPHILS NFR BLD: 63.4 % (ref 38–73)
NONHDLC SERPL-MCNC: 199 MG/DL
NRBC BLD-RTO: 0 /100 WBC
PLATELET # BLD AUTO: 241 K/UL (ref 150–350)
PMV BLD AUTO: 11.1 FL (ref 9.2–12.9)
POTASSIUM SERPL-SCNC: 4.9 MMOL/L (ref 3.5–5.1)
PROT SERPL-MCNC: 7.7 G/DL (ref 6–8.4)
RBC # BLD AUTO: 5.62 M/UL (ref 4.6–6.2)
SODIUM SERPL-SCNC: 139 MMOL/L (ref 136–145)
TRIGL SERPL-MCNC: 95 MG/DL (ref 30–150)
TSH SERPL DL<=0.005 MIU/L-ACNC: 1.95 UIU/ML (ref 0.4–4)
WBC # BLD AUTO: 10.97 K/UL (ref 3.9–12.7)

## 2020-01-20 PROCEDURE — 99999 PR PBB SHADOW E&M-EST. PATIENT-LVL III: CPT | Mod: PBBFAC,,, | Performed by: FAMILY MEDICINE

## 2020-01-20 PROCEDURE — 80061 LIPID PANEL: CPT

## 2020-01-20 PROCEDURE — 83036 HEMOGLOBIN GLYCOSYLATED A1C: CPT

## 2020-01-20 PROCEDURE — 82306 VITAMIN D 25 HYDROXY: CPT

## 2020-01-20 PROCEDURE — 85025 COMPLETE CBC W/AUTO DIFF WBC: CPT

## 2020-01-20 PROCEDURE — 84443 ASSAY THYROID STIM HORMONE: CPT

## 2020-01-20 PROCEDURE — 99396 PREV VISIT EST AGE 40-64: CPT | Mod: S$GLB,,, | Performed by: FAMILY MEDICINE

## 2020-01-20 PROCEDURE — 99396 PR PREVENTIVE VISIT,EST,40-64: ICD-10-PCS | Mod: S$GLB,,, | Performed by: FAMILY MEDICINE

## 2020-01-20 PROCEDURE — 84153 ASSAY OF PSA TOTAL: CPT

## 2020-01-20 PROCEDURE — 36415 COLL VENOUS BLD VENIPUNCTURE: CPT | Mod: PO

## 2020-01-20 PROCEDURE — 80053 COMPREHEN METABOLIC PANEL: CPT

## 2020-01-20 PROCEDURE — 99999 PR PBB SHADOW E&M-EST. PATIENT-LVL III: ICD-10-PCS | Mod: PBBFAC,,, | Performed by: FAMILY MEDICINE

## 2020-01-20 NOTE — PROGRESS NOTES
Subjective:       Patient ID: Pierre Grossman is a 59 y.o. male.    Chief Complaint: Hip Pain (Left-while walking) and Annual Exam    59 year old male presents for an annual exam.     He also has left hip pain with standing or walking for long periods of time. He takes meloxicam, 1-2 times a week. He states this helps a bit. He has had this pain for the last 3 months.       Past Medical History:  1986: Head injury  No date: Nephrolithiasis   Past Surgical History:  No date: CERVICAL FUSION  5/30/2018: COLONOSCOPY; N/A      Comment:  Procedure: COLONOSCOPY;  Surgeon: Zakia Mirza MD;               Location: [facility];  Service: Endoscopy;  Laterality:                N/A;  No date: EYE SURGERY      Comment:  cataract  No date: HERNIA REPAIR  10/3/2018: TOTAL REPLACEMENT OF HIP JOINT USING COMPUTER-ASSISTED   NAVIGATION; Right      Comment:  Procedure: REPLACEMENT-HIP-TOTAL WITH NAVIGATION;                 Surgeon: [facility]. [facility] Jr., MD;  Location: [address];  Service: Orthopedics;  Laterality: Right;  Review of patient's family history indicates:  Problem: Aneurysm      Relation: Mother          Age of Onset: (Not Specified)          Comment: brain  Problem: Pancreatitis      Relation: Father          Age of Onset: (Not Specified)  Problem: Heart attack      Relation: Neg Hx          Age of Onset: (Not Specified)  Problem: Hypertension      Relation: Neg Hx          Age of Onset: (Not Specified)    Social History    Socioeconomic History      Marital status:       Spouse name: Not on file      Number of children: Not on file      Years of education: Not on file      Highest education level: Not on file    Occupational History      Not on file    Social Needs      Financial resource strain: Not on file      Food insecurity:        Worry: Not on file        Inability: Not on file      Transportation needs:        Medical: Not on file        Non-medical: Not on file    Tobacco Use       Smoking status: Never Smoker      Smokeless tobacco: Never Used    Substance and Sexual Activity      Alcohol use: No      Drug use: No      Sexual activity: Yes        Partners: Female    Lifestyle      Physical activity:        Days per week: Not on file        Minutes per session: Not on file      Stress: Not on file    Relationships      Social connections:        Talks on phone: Not on file        Gets together: Not on file        Attends Mormonism service: Not on file        Active member of club or organization: Not on file        Attends meetings of clubs or organizations: Not on file        Relationship status: Not on file    Other Topics      Concerns:        Not on file    Social History Narrative      Not on file    '      Past Medical History:   Diagnosis Date    Head injury 1986    Nephrolithiasis       Past Surgical History:   Procedure Laterality Date    CERVICAL FUSION      COLONOSCOPY N/A 5/30/2018    Procedure: COLONOSCOPY;  Surgeon: Zakia Mirza MD;  Location: [facility];  Service: Endoscopy;  Laterality: N/A;    EYE SURGERY      cataract    HERNIA REPAIR      TOTAL REPLACEMENT OF HIP JOINT USING COMPUTER-ASSISTED NAVIGATION Right 10/3/2018    Procedure: REPLACEMENT-HIP-TOTAL WITH NAVIGATION;  Surgeon: [facility] L[facility] Jr., MD;  Location: [address];  Service: Orthopedics;  Laterality: Right;     Family History   Problem Relation Age of Onset    Aneurysm Mother         brain    Pancreatitis Father     Heart attack Neg Hx     Hypertension Neg Hx      Social History     Socioeconomic History    Marital status:      Spouse name: Not on file    Number of children: Not on file    Years of education: Not on file    Highest education level: Not on file   Occupational History    Not on file   Social Needs    Financial resource strain: Not on file    Food insecurity:     Worry: Not on file     Inability: Not on file    Transportation needs:     Medical: Not on file      "Non-medical: Not on file   Tobacco Use    Smoking status: Never Smoker    Smokeless tobacco: Never Used   Substance and Sexual Activity    Alcohol use: No    Drug use: No    Sexual activity: Yes     Partners: Female   Lifestyle    Physical activity:     Days per week: Not on file     Minutes per session: Not on file    Stress: Not on file   Relationships    Social connections:     Talks on phone: Not on file     Gets together: Not on file     Attends Bahai service: Not on file     Active member of club or organization: Not on file     Attends meetings of clubs or organizations: Not on file     Relationship status: Not on file   Other Topics Concern    Not on file   Social History Narrative    Not on file       Review of Systems   Constitutional: Negative for chills, fatigue and fever.   Respiratory: Negative for cough, chest tightness and shortness of breath.    Cardiovascular: Negative for chest pain, palpitations and leg swelling.   Gastrointestinal: Negative for abdominal pain, blood in stool, constipation, diarrhea, nausea and vomiting.   Genitourinary: Negative for dysuria and hematuria.   Neurological: Positive for dizziness. Negative for syncope, light-headedness and headaches.        Dizziness with leaning over       Objective:       Vitals:    01/20/20 0806   BP: 126/80   Pulse: 68   Resp: 18   Temp: 98 °F (36.7 °C)   TempSrc: Oral   SpO2: 97%   Weight: 95.1 kg (209 lb 10.5 oz)   Height: 5' 10" (1.778 m)       Physical Exam   Constitutional: He is oriented to person, place, and time. He appears well-developed and well-nourished. No distress.   HENT:   Head: Normocephalic.   Right Ear: External ear normal.   Left Ear: External ear normal.   Mouth/Throat: Oropharynx is clear and moist. No oropharyngeal exudate.   Eyes: Conjunctivae are normal.   Neck: Normal range of motion. Neck supple. No thyromegaly present.   Cardiovascular: Normal rate, regular rhythm and normal heart sounds. Exam reveals " no gallop and no friction rub.   No murmur heard.  Pulmonary/Chest: Effort normal and breath sounds normal. No stridor. No respiratory distress. He has no wheezes. He has no rales.   Abdominal: Soft. Bowel sounds are normal. He exhibits no distension and no mass. There is no tenderness. There is no rebound and no guarding.   Musculoskeletal: He exhibits no edema.        Left hip: He exhibits decreased range of motion. He exhibits normal strength, no bony tenderness, no swelling, no crepitus, no deformity and no laceration.   Lymphadenopathy:     He has no cervical adenopathy.   Neurological: He is alert and oriented to person, place, and time.   Skin: No rash noted. He is not diaphoretic.   Psychiatric: He has a normal mood and affect.       Assessment:       1. Annual physical exam    2. Encounter for lipid screening for cardiovascular disease    3. Nephrolithiasis    4. Arthritis of left hip        Plan:       Pierre was seen today for hip pain and annual exam.    Diagnoses and all orders for this visit:    Annual physical exam  -     CBC auto differential; Future  -     Comprehensive metabolic panel; Future  -     Hemoglobin A1c; Future  -     PSA, Screening; Future  -     TSH; Future  -     Vitamin D; Future    Encounter for lipid screening for cardiovascular disease  -     Lipid panel; Future    Nephrolithiasis    Arthritis of left hip  -     Ambulatory Referral to Physical/Occupational Therapy

## 2020-01-20 NOTE — TELEPHONE ENCOUNTER
----- Message from Truong Altman sent at 1/20/2020 12:13 PM CST -----  Contact: Pierre [phone]  Type: Patient Call Back    Who called:Pierre     What is the request in detail: Patient would like referral for physical therapy changed to [facility] [place]    Can the clinic reply by [facility]?no    Would the patient rather a call back or a response via [facility]? Call back     Best call back number:[phone]

## 2020-01-29 ENCOUNTER — TELEPHONE (OUTPATIENT)
Dept: FAMILY MEDICINE | Facility: CLINIC | Age: 60
End: 2020-01-29

## 2020-03-26 ENCOUNTER — PATIENT MESSAGE (OUTPATIENT)
Dept: FAMILY MEDICINE | Facility: CLINIC | Age: 60
End: 2020-03-26

## 2020-03-26 DIAGNOSIS — G89.29 CHRONIC RIGHT HIP PAIN: ICD-10-CM

## 2020-03-26 DIAGNOSIS — M25.551 CHRONIC RIGHT HIP PAIN: ICD-10-CM

## 2020-03-27 RX ORDER — PANTOPRAZOLE SODIUM 40 MG/1
40 TABLET, DELAYED RELEASE ORAL DAILY
Qty: 30 TABLET | Refills: 11 | Status: SHIPPED | OUTPATIENT
Start: 2020-03-27 | End: 2021-04-07

## 2020-03-27 RX ORDER — DICLOFENAC SODIUM 50 MG/1
50 TABLET, DELAYED RELEASE ORAL DAILY PRN
Qty: 60 TABLET | Refills: 2 | Status: SHIPPED | OUTPATIENT
Start: 2020-03-27 | End: 2020-08-21

## 2021-04-06 ENCOUNTER — PATIENT MESSAGE (OUTPATIENT)
Dept: ADMINISTRATIVE | Facility: HOSPITAL | Age: 61
End: 2021-04-06

## 2021-07-07 ENCOUNTER — PATIENT MESSAGE (OUTPATIENT)
Dept: ADMINISTRATIVE | Facility: HOSPITAL | Age: 61
End: 2021-07-07

## 2021-10-12 ENCOUNTER — OFFICE VISIT (OUTPATIENT)
Dept: FAMILY MEDICINE | Facility: CLINIC | Age: 61
End: 2021-10-12
Payer: COMMERCIAL

## 2021-10-12 VITALS
WEIGHT: 209.44 LBS | TEMPERATURE: 98 F | SYSTOLIC BLOOD PRESSURE: 134 MMHG | HEIGHT: 70 IN | BODY MASS INDEX: 29.98 KG/M2 | OXYGEN SATURATION: 95 % | HEART RATE: 70 BPM | DIASTOLIC BLOOD PRESSURE: 94 MMHG

## 2021-10-12 DIAGNOSIS — R42 VERTIGO: ICD-10-CM

## 2021-10-12 DIAGNOSIS — M75.81 ROTATOR CUFF TENDINITIS, RIGHT: Primary | ICD-10-CM

## 2021-10-12 PROCEDURE — 96372 PR INJECTION,THERAP/PROPH/DIAG2ST, IM OR SUBCUT: ICD-10-PCS | Mod: S$GLB,,, | Performed by: FAMILY MEDICINE

## 2021-10-12 PROCEDURE — 3080F PR MOST RECENT DIASTOLIC BLOOD PRESSURE >= 90 MM HG: ICD-10-PCS | Mod: CPTII,S$GLB,, | Performed by: FAMILY MEDICINE

## 2021-10-12 PROCEDURE — 1159F MED LIST DOCD IN RCRD: CPT | Mod: CPTII,S$GLB,, | Performed by: FAMILY MEDICINE

## 2021-10-12 PROCEDURE — 1160F RVW MEDS BY RX/DR IN RCRD: CPT | Mod: CPTII,S$GLB,, | Performed by: FAMILY MEDICINE

## 2021-10-12 PROCEDURE — 99214 OFFICE O/P EST MOD 30 MIN: CPT | Mod: 25,S$GLB,, | Performed by: FAMILY MEDICINE

## 2021-10-12 PROCEDURE — 96372 THER/PROPH/DIAG INJ SC/IM: CPT | Mod: S$GLB,,, | Performed by: FAMILY MEDICINE

## 2021-10-12 PROCEDURE — 3080F DIAST BP >= 90 MM HG: CPT | Mod: CPTII,S$GLB,, | Performed by: FAMILY MEDICINE

## 2021-10-12 PROCEDURE — 3008F BODY MASS INDEX DOCD: CPT | Mod: CPTII,S$GLB,, | Performed by: FAMILY MEDICINE

## 2021-10-12 PROCEDURE — 1160F PR REVIEW ALL MEDS BY PRESCRIBER/CLIN PHARMACIST DOCUMENTED: ICD-10-PCS | Mod: CPTII,S$GLB,, | Performed by: FAMILY MEDICINE

## 2021-10-12 PROCEDURE — 3008F PR BODY MASS INDEX (BMI) DOCUMENTED: ICD-10-PCS | Mod: CPTII,S$GLB,, | Performed by: FAMILY MEDICINE

## 2021-10-12 PROCEDURE — 1159F PR MEDICATION LIST DOCUMENTED IN MEDICAL RECORD: ICD-10-PCS | Mod: CPTII,S$GLB,, | Performed by: FAMILY MEDICINE

## 2021-10-12 PROCEDURE — 3075F SYST BP GE 130 - 139MM HG: CPT | Mod: CPTII,S$GLB,, | Performed by: FAMILY MEDICINE

## 2021-10-12 PROCEDURE — 99214 PR OFFICE/OUTPT VISIT, EST, LEVL IV, 30-39 MIN: ICD-10-PCS | Mod: 25,S$GLB,, | Performed by: FAMILY MEDICINE

## 2021-10-12 PROCEDURE — 99999 PR PBB SHADOW E&M-EST. PATIENT-LVL III: ICD-10-PCS | Mod: PBBFAC,,, | Performed by: FAMILY MEDICINE

## 2021-10-12 PROCEDURE — 99999 PR PBB SHADOW E&M-EST. PATIENT-LVL III: CPT | Mod: PBBFAC,,, | Performed by: FAMILY MEDICINE

## 2021-10-12 PROCEDURE — 3075F PR MOST RECENT SYSTOLIC BLOOD PRESS GE 130-139MM HG: ICD-10-PCS | Mod: CPTII,S$GLB,, | Performed by: FAMILY MEDICINE

## 2021-10-12 RX ORDER — METHYLPREDNISOLONE ACETATE 40 MG/ML
40 INJECTION, SUSPENSION INTRA-ARTICULAR; INTRALESIONAL; INTRAMUSCULAR; SOFT TISSUE
Status: COMPLETED | OUTPATIENT
Start: 2021-10-12 | End: 2021-10-12

## 2021-10-12 RX ADMIN — METHYLPREDNISOLONE ACETATE 40 MG: 40 INJECTION, SUSPENSION INTRA-ARTICULAR; INTRALESIONAL; INTRAMUSCULAR; SOFT TISSUE at 12:10

## 2021-11-02 ENCOUNTER — TELEPHONE (OUTPATIENT)
Dept: FAMILY MEDICINE | Facility: CLINIC | Age: 61
End: 2021-11-02
Payer: COMMERCIAL

## 2021-11-09 ENCOUNTER — TELEPHONE (OUTPATIENT)
Dept: FAMILY MEDICINE | Facility: CLINIC | Age: 61
End: 2021-11-09
Payer: COMMERCIAL

## 2021-11-15 ENCOUNTER — OFFICE VISIT (OUTPATIENT)
Dept: FAMILY MEDICINE | Facility: CLINIC | Age: 61
End: 2021-11-15
Payer: COMMERCIAL

## 2021-11-15 VITALS
OXYGEN SATURATION: 96 % | BODY MASS INDEX: 30.3 KG/M2 | HEART RATE: 72 BPM | DIASTOLIC BLOOD PRESSURE: 88 MMHG | TEMPERATURE: 98 F | SYSTOLIC BLOOD PRESSURE: 130 MMHG | HEIGHT: 70 IN | WEIGHT: 211.63 LBS

## 2021-11-15 DIAGNOSIS — M25.511 CHRONIC RIGHT SHOULDER PAIN: ICD-10-CM

## 2021-11-15 DIAGNOSIS — G89.29 CHRONIC RIGHT SHOULDER PAIN: ICD-10-CM

## 2021-11-15 PROCEDURE — 3008F BODY MASS INDEX DOCD: CPT | Mod: CPTII,S$GLB,, | Performed by: FAMILY MEDICINE

## 2021-11-15 PROCEDURE — 99999 PR PBB SHADOW E&M-EST. PATIENT-LVL III: ICD-10-PCS | Mod: PBBFAC,,, | Performed by: FAMILY MEDICINE

## 2021-11-15 PROCEDURE — 3079F DIAST BP 80-89 MM HG: CPT | Mod: CPTII,S$GLB,, | Performed by: FAMILY MEDICINE

## 2021-11-15 PROCEDURE — 1159F PR MEDICATION LIST DOCUMENTED IN MEDICAL RECORD: ICD-10-PCS | Mod: CPTII,S$GLB,, | Performed by: FAMILY MEDICINE

## 2021-11-15 PROCEDURE — 99214 PR OFFICE/OUTPT VISIT, EST, LEVL IV, 30-39 MIN: ICD-10-PCS | Mod: S$GLB,,, | Performed by: FAMILY MEDICINE

## 2021-11-15 PROCEDURE — 3079F PR MOST RECENT DIASTOLIC BLOOD PRESSURE 80-89 MM HG: ICD-10-PCS | Mod: CPTII,S$GLB,, | Performed by: FAMILY MEDICINE

## 2021-11-15 PROCEDURE — 1159F MED LIST DOCD IN RCRD: CPT | Mod: CPTII,S$GLB,, | Performed by: FAMILY MEDICINE

## 2021-11-15 PROCEDURE — 99999 PR PBB SHADOW E&M-EST. PATIENT-LVL III: CPT | Mod: PBBFAC,,, | Performed by: FAMILY MEDICINE

## 2021-11-15 PROCEDURE — 3008F PR BODY MASS INDEX (BMI) DOCUMENTED: ICD-10-PCS | Mod: CPTII,S$GLB,, | Performed by: FAMILY MEDICINE

## 2021-11-15 PROCEDURE — 3075F SYST BP GE 130 - 139MM HG: CPT | Mod: CPTII,S$GLB,, | Performed by: FAMILY MEDICINE

## 2021-11-15 PROCEDURE — 99214 OFFICE O/P EST MOD 30 MIN: CPT | Mod: S$GLB,,, | Performed by: FAMILY MEDICINE

## 2021-11-15 PROCEDURE — 3075F PR MOST RECENT SYSTOLIC BLOOD PRESS GE 130-139MM HG: ICD-10-PCS | Mod: CPTII,S$GLB,, | Performed by: FAMILY MEDICINE

## 2021-11-15 RX ORDER — METHYLPREDNISOLONE 4 MG/1
TABLET ORAL
Qty: 1 EACH | Refills: 0 | Status: SHIPPED | OUTPATIENT
Start: 2021-11-15 | End: 2022-09-16

## 2021-11-16 ENCOUNTER — HOSPITAL ENCOUNTER (OUTPATIENT)
Dept: RADIOLOGY | Facility: HOSPITAL | Age: 61
Discharge: HOME | End: 2021-11-16
Attending: ORTHOPAEDIC SURGERY
Payer: COMMERCIAL

## 2021-11-16 ENCOUNTER — OFFICE VISIT (OUTPATIENT)
Dept: SPORTS MEDICINE | Facility: CLINIC | Age: 61
End: 2021-11-16
Payer: COMMERCIAL

## 2021-11-16 VITALS
BODY MASS INDEX: 30.21 KG/M2 | WEIGHT: 211 LBS | HEART RATE: 85 BPM | HEIGHT: 70 IN | SYSTOLIC BLOOD PRESSURE: 148 MMHG | DIASTOLIC BLOOD PRESSURE: 93 MMHG

## 2021-11-16 DIAGNOSIS — M25.511 CHRONIC RIGHT SHOULDER PAIN: ICD-10-CM

## 2021-11-16 DIAGNOSIS — G89.29 CHRONIC RIGHT SHOULDER PAIN: ICD-10-CM

## 2021-11-16 PROCEDURE — 3080F DIAST BP >= 90 MM HG: CPT | Mod: CPTII,S$GLB,, | Performed by: ORTHOPAEDIC SURGERY

## 2021-11-16 PROCEDURE — 99999 PR PBB SHADOW E&M-EST. PATIENT-LVL III: CPT | Mod: PBBFAC,,, | Performed by: ORTHOPAEDIC SURGERY

## 2021-11-16 PROCEDURE — 3077F SYST BP >= 140 MM HG: CPT | Mod: CPTII,S$GLB,, | Performed by: ORTHOPAEDIC SURGERY

## 2021-11-16 PROCEDURE — 99203 OFFICE O/P NEW LOW 30 MIN: CPT | Mod: S$GLB,,, | Performed by: ORTHOPAEDIC SURGERY

## 2021-11-16 PROCEDURE — 99999 PR PBB SHADOW E&M-EST. PATIENT-LVL III: ICD-10-PCS | Mod: PBBFAC,,, | Performed by: ORTHOPAEDIC SURGERY

## 2021-11-16 PROCEDURE — 1159F PR MEDICATION LIST DOCUMENTED IN MEDICAL RECORD: ICD-10-PCS | Mod: CPTII,S$GLB,, | Performed by: ORTHOPAEDIC SURGERY

## 2021-11-16 PROCEDURE — 73030 X-RAY EXAM OF SHOULDER: CPT | Mod: 26,RT,, | Performed by: RADIOLOGY

## 2021-11-16 PROCEDURE — 73030 X-RAY EXAM OF SHOULDER: CPT | Mod: TC,RT

## 2021-11-16 PROCEDURE — 3008F PR BODY MASS INDEX (BMI) DOCUMENTED: ICD-10-PCS | Mod: CPTII,S$GLB,, | Performed by: ORTHOPAEDIC SURGERY

## 2021-11-16 PROCEDURE — 3008F BODY MASS INDEX DOCD: CPT | Mod: CPTII,S$GLB,, | Performed by: ORTHOPAEDIC SURGERY

## 2021-11-16 PROCEDURE — 73030 XR SHOULDER COMPLETE 2 OR MORE VIEWS RIGHT: ICD-10-PCS | Mod: 26,RT,, | Performed by: RADIOLOGY

## 2021-11-16 PROCEDURE — 3077F PR MOST RECENT SYSTOLIC BLOOD PRESSURE >= 140 MM HG: ICD-10-PCS | Mod: CPTII,S$GLB,, | Performed by: ORTHOPAEDIC SURGERY

## 2021-11-16 PROCEDURE — 99203 PR OFFICE/OUTPT VISIT, NEW, LEVL III, 30-44 MIN: ICD-10-PCS | Mod: S$GLB,,, | Performed by: ORTHOPAEDIC SURGERY

## 2021-11-16 PROCEDURE — 3080F PR MOST RECENT DIASTOLIC BLOOD PRESSURE >= 90 MM HG: ICD-10-PCS | Mod: CPTII,S$GLB,, | Performed by: ORTHOPAEDIC SURGERY

## 2021-11-16 PROCEDURE — 1159F MED LIST DOCD IN RCRD: CPT | Mod: CPTII,S$GLB,, | Performed by: ORTHOPAEDIC SURGERY

## 2021-11-17 ENCOUNTER — OFFICE VISIT (OUTPATIENT)
Dept: OTOLARYNGOLOGY | Facility: CLINIC | Age: 61
End: 2021-11-17
Payer: COMMERCIAL

## 2021-11-17 ENCOUNTER — CLINICAL SUPPORT (OUTPATIENT)
Dept: AUDIOLOGY | Facility: CLINIC | Age: 61
End: 2021-11-17
Payer: COMMERCIAL

## 2021-11-17 VITALS — BODY MASS INDEX: 30.43 KG/M2 | WEIGHT: 212.06 LBS

## 2021-11-17 DIAGNOSIS — R42 VERTIGO: ICD-10-CM

## 2021-11-17 DIAGNOSIS — R42 MIGRAINOUS DIZZINESS: ICD-10-CM

## 2021-11-17 DIAGNOSIS — H90.3 BILATERAL SENSORINEURAL HEARING LOSS: Primary | ICD-10-CM

## 2021-11-17 DIAGNOSIS — H81.8X9 OTHER DISORDERS OF VESTIBULAR FUNCTION, UNSPECIFIED EAR: Primary | ICD-10-CM

## 2021-11-17 DIAGNOSIS — R42 DIZZINESS AND GIDDINESS: Primary | ICD-10-CM

## 2021-11-17 PROCEDURE — 92567 PR TYMPA2METRY: ICD-10-PCS | Mod: S$GLB,,, | Performed by: AUDIOLOGIST

## 2021-11-17 PROCEDURE — 99205 PR OFFICE/OUTPT VISIT, NEW, LEVL V, 60-74 MIN: ICD-10-PCS | Mod: S$GLB,,, | Performed by: OTOLARYNGOLOGY

## 2021-11-17 PROCEDURE — 92557 PR COMPREHENSIVE HEARING TEST: ICD-10-PCS | Mod: S$GLB,,, | Performed by: AUDIOLOGIST

## 2021-11-17 PROCEDURE — 92567 TYMPANOMETRY: CPT | Mod: S$GLB,,, | Performed by: AUDIOLOGIST

## 2021-11-17 PROCEDURE — 92537 PR CALORIC VSTBLR TEST W/REC BITHERMAL: ICD-10-PCS | Mod: S$GLB,,, | Performed by: AUDIOLOGIST

## 2021-11-17 PROCEDURE — 3008F BODY MASS INDEX DOCD: CPT | Mod: CPTII,S$GLB,, | Performed by: OTOLARYNGOLOGY

## 2021-11-17 PROCEDURE — 92540 BASIC VESTIBULAR EVALUATION: CPT | Mod: S$GLB,,, | Performed by: AUDIOLOGIST

## 2021-11-17 PROCEDURE — 92537 CALORIC VSTBLR TEST W/REC: CPT | Mod: S$GLB,,, | Performed by: AUDIOLOGIST

## 2021-11-17 PROCEDURE — 99205 OFFICE O/P NEW HI 60 MIN: CPT | Mod: S$GLB,,, | Performed by: OTOLARYNGOLOGY

## 2021-11-17 PROCEDURE — 1159F PR MEDICATION LIST DOCUMENTED IN MEDICAL RECORD: ICD-10-PCS | Mod: CPTII,S$GLB,, | Performed by: OTOLARYNGOLOGY

## 2021-11-17 PROCEDURE — 99999 PR PBB SHADOW E&M-EST. PATIENT-LVL III: ICD-10-PCS | Mod: PBBFAC,,, | Performed by: OTOLARYNGOLOGY

## 2021-11-17 PROCEDURE — 1159F MED LIST DOCD IN RCRD: CPT | Mod: CPTII,S$GLB,, | Performed by: OTOLARYNGOLOGY

## 2021-11-17 PROCEDURE — 92557 COMPREHENSIVE HEARING TEST: CPT | Mod: S$GLB,,, | Performed by: AUDIOLOGIST

## 2021-11-17 PROCEDURE — 92540 PR VESTIBULAR EVAL NYSTAG FOVL&PERPH STIM OSCIL TRACKING: ICD-10-PCS | Mod: S$GLB,,, | Performed by: AUDIOLOGIST

## 2021-11-17 PROCEDURE — 3008F PR BODY MASS INDEX (BMI) DOCUMENTED: ICD-10-PCS | Mod: CPTII,S$GLB,, | Performed by: OTOLARYNGOLOGY

## 2021-11-17 PROCEDURE — 99999 PR PBB SHADOW E&M-EST. PATIENT-LVL III: CPT | Mod: PBBFAC,,, | Performed by: OTOLARYNGOLOGY

## 2021-11-17 RX ORDER — MECLIZINE HYDROCHLORIDE 25 MG/1
25 TABLET ORAL 3 TIMES DAILY PRN
Qty: 30 TABLET | Refills: 1 | Status: SHIPPED | OUTPATIENT
Start: 2021-11-17

## 2022-01-20 ENCOUNTER — PATIENT OUTREACH (OUTPATIENT)
Dept: ADMINISTRATIVE | Facility: OTHER | Age: 62
End: 2022-01-20
Payer: COMMERCIAL

## 2022-05-04 NOTE — TELEPHONE ENCOUNTER
Refill Routing Note   Medication(s) are not appropriate for processing by [facility] for the following reason(s):      - Required indication for medication not on problem list (GERD)    [facility] action(s):  Defer          Medication reconciliation completed: No     Appointments  past 12m or future 3m with PCP    Date Provider   Last Visit   11/15/2021 Socorro Payton MD   Next Visit   Visit date not found Socorro Payton MD   ED visits in past 90 days: 0        Note composed:3:00 PM 05/04/2022

## 2022-05-04 NOTE — TELEPHONE ENCOUNTER
No new care gaps identified.  [facility] Embedded Care Gaps. Reference number: [id number]. 5/04/2022   8:23:15 AM CDT

## 2022-05-06 RX ORDER — PANTOPRAZOLE SODIUM 40 MG/1
TABLET, DELAYED RELEASE ORAL
Qty: 30 TABLET | Refills: 11 | Status: SHIPPED | OUTPATIENT
Start: 2022-05-06 | End: 2023-07-11 | Stop reason: SDUPTHER

## 2022-09-14 ENCOUNTER — HOSPITAL ENCOUNTER (EMERGENCY)
Facility: HOSPITAL | Age: 62
Discharge: HOME | End: 2022-09-14
Attending: EMERGENCY MEDICINE
Payer: COMMERCIAL

## 2022-09-14 ENCOUNTER — HOSPITAL ENCOUNTER (OUTPATIENT)
Dept: RADIOLOGY | Facility: HOSPITAL | Age: 62
Discharge: HOME | End: 2022-09-14
Attending: FAMILY MEDICINE
Payer: COMMERCIAL

## 2022-09-14 ENCOUNTER — OFFICE VISIT (OUTPATIENT)
Dept: INTERNAL MEDICINE | Facility: CLINIC | Age: 62
End: 2022-09-14
Payer: COMMERCIAL

## 2022-09-14 VITALS
OXYGEN SATURATION: 98 % | HEART RATE: 60 BPM | SYSTOLIC BLOOD PRESSURE: 166 MMHG | HEIGHT: 70 IN | WEIGHT: 210 LBS | DIASTOLIC BLOOD PRESSURE: 85 MMHG | TEMPERATURE: 98 F | RESPIRATION RATE: 18 BRPM | BODY MASS INDEX: 30.06 KG/M2

## 2022-09-14 VITALS
HEART RATE: 66 BPM | WEIGHT: 210.13 LBS | SYSTOLIC BLOOD PRESSURE: 134 MMHG | DIASTOLIC BLOOD PRESSURE: 86 MMHG | BODY MASS INDEX: 30.08 KG/M2 | OXYGEN SATURATION: 94 % | HEIGHT: 70 IN

## 2022-09-14 DIAGNOSIS — R10.9 ABDOMINAL PAIN, UNSPECIFIED ABDOMINAL LOCATION: ICD-10-CM
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
## 2022-09-14 DIAGNOSIS — N20.1 URETEROLITHIASIS: ICD-10-CM

## 2022-09-14 DIAGNOSIS — R10.9 ABDOMINAL PAIN, UNSPECIFIED ABDOMINAL LOCATION: Primary | ICD-10-CM

## 2022-09-14 DIAGNOSIS — S22.32XA CLOSED FRACTURE OF ONE RIB OF LEFT SIDE, INITIAL ENCOUNTER: Primary | ICD-10-CM

## 2022-09-14 DIAGNOSIS — R03.0 ELEVATED BLOOD PRESSURE READING: ICD-10-CM

## 2022-09-14 LAB
ALBUMIN SERPL BCP-MCNC: 4 G/DL (ref 3.5–5.2)
ALP SERPL-CCNC: 109 U/L (ref 55–135)
ALT SERPL W/O P-5'-P-CCNC: 33 U/L (ref 10–44)
ANION GAP SERPL CALC-SCNC: 11 MMOL/L (ref 8–16)
AST SERPL-CCNC: 22 U/L (ref 10–40)
BASOPHILS # BLD AUTO: 0.11 K/UL (ref 0–0.2)
BASOPHILS NFR BLD: 1 % (ref 0–1.9)
BILIRUB SERPL-MCNC: 0.6 MG/DL (ref 0.1–1)
BILIRUB UR QL STRIP: NEGATIVE
BUN SERPL-MCNC: 15 MG/DL (ref 8–23)
CALCIUM SERPL-MCNC: 10 MG/DL (ref 8.7–10.5)
CHLORIDE SERPL-SCNC: 105 MMOL/L (ref 95–110)
CLARITY UR: CLEAR
CO2 SERPL-SCNC: 25 MMOL/L (ref 23–29)
COLOR UR: YELLOW
CREAT SERPL-MCNC: 1.2 MG/DL (ref 0.5–1.4)
DIFFERENTIAL METHOD: ABNORMAL
EOSINOPHIL # BLD AUTO: 0.7 K/UL (ref 0–0.5)
EOSINOPHIL NFR BLD: 6.2 % (ref 0–8)
ERYTHROCYTE [DISTWIDTH] IN BLOOD BY AUTOMATED COUNT: 12.7 % (ref 11.5–14.5)
EST. GFR  (NO RACE VARIABLE): >60 ML/MIN/1.73 M^2
GLUCOSE SERPL-MCNC: 93 MG/DL (ref 70–110)
GLUCOSE UR QL STRIP: NEGATIVE
HCT VFR BLD AUTO: 46.6 % (ref 40–54)
HGB BLD-MCNC: 15.8 G/DL (ref 14–18)
HGB UR QL STRIP: NEGATIVE
IMM GRANULOCYTES # BLD AUTO: 0.08 K/UL (ref 0–0.04)
IMM GRANULOCYTES NFR BLD AUTO: 0.7 % (ref 0–0.5)
KETONES UR QL STRIP: NEGATIVE
LEUKOCYTE ESTERASE UR QL STRIP: NEGATIVE
LYMPHOCYTES # BLD AUTO: 2.3 K/UL (ref 1–4.8)
LYMPHOCYTES NFR BLD: 20.4 % (ref 18–48)
MCH RBC QN AUTO: 28.7 PG (ref 27–31)
MCHC RBC AUTO-ENTMCNC: 33.9 G/DL (ref 32–36)
MCV RBC AUTO: 85 FL (ref 82–98)
MONOCYTES # BLD AUTO: 1 K/UL (ref 0.3–1)
MONOCYTES NFR BLD: 8.7 % (ref 4–15)
NEUTROPHILS # BLD AUTO: 7.1 K/UL (ref 1.8–7.7)
NEUTROPHILS NFR BLD: 63 % (ref 38–73)
NITRITE UR QL STRIP: NEGATIVE
NRBC BLD-RTO: 0 /100 WBC
PH UR STRIP: 6 [PH] (ref 5–8)
PLATELET # BLD AUTO: 247 K/UL (ref 150–450)
PMV BLD AUTO: 10.1 FL (ref 9.2–12.9)
POTASSIUM SERPL-SCNC: 4.5 MMOL/L (ref 3.5–5.1)
PROT SERPL-MCNC: 8.3 G/DL (ref 6–8.4)
PROT UR QL STRIP: NEGATIVE
RBC # BLD AUTO: 5.5 M/UL (ref 4.6–6.2)
SODIUM SERPL-SCNC: 141 MMOL/L (ref 136–145)
SP GR UR STRIP: 1.03 (ref 1–1.03)
URN SPEC COLLECT METH UR: NORMAL
UROBILINOGEN UR STRIP-ACNC: NEGATIVE EU/DL
WBC # BLD AUTO: 11.33 K/UL (ref 3.9–12.7)

## 2022-09-14 PROCEDURE — 99284 EMERGENCY DEPT VISIT MOD MDM: CPT | Mod: 25

## 2022-09-14 PROCEDURE — 85025 COMPLETE CBC W/AUTO DIFF WBC: CPT | Performed by: EMERGENCY MEDICINE

## 2022-09-14 PROCEDURE — 80053 COMPREHEN METABOLIC PANEL: CPT | Performed by: EMERGENCY MEDICINE

## 2022-09-14 PROCEDURE — 1160F RVW MEDS BY RX/DR IN RCRD: CPT | Mod: CPTII,S$GLB,, | Performed by: FAMILY MEDICINE

## 2022-09-14 PROCEDURE — 3008F BODY MASS INDEX DOCD: CPT | Mod: CPTII,S$GLB,, | Performed by: FAMILY MEDICINE

## 2022-09-14 PROCEDURE — 3079F PR MOST RECENT DIASTOLIC BLOOD PRESSURE 80-89 MM HG: ICD-10-PCS | Mod: CPTII,S$GLB,, | Performed by: FAMILY MEDICINE

## 2022-09-14 PROCEDURE — 1159F MED LIST DOCD IN RCRD: CPT | Mod: CPTII,S$GLB,, | Performed by: FAMILY MEDICINE

## 2022-09-14 PROCEDURE — 36000 PLACE NEEDLE IN VEIN: CPT

## 2022-09-14 PROCEDURE — 1160F PR REVIEW ALL MEDS BY PRESCRIBER/CLIN PHARMACIST DOCUMENTED: ICD-10-PCS | Mod: CPTII,S$GLB,, | Performed by: FAMILY MEDICINE

## 2022-09-14 PROCEDURE — 71100 X-RAY EXAM RIBS UNI 2 VIEWS: CPT | Mod: TC,LT

## 2022-09-14 PROCEDURE — 3079F DIAST BP 80-89 MM HG: CPT | Mod: CPTII,S$GLB,, | Performed by: FAMILY MEDICINE

## 2022-09-14 PROCEDURE — 1159F PR MEDICATION LIST DOCUMENTED IN MEDICAL RECORD: ICD-10-PCS | Mod: CPTII,S$GLB,, | Performed by: FAMILY MEDICINE

## 2022-09-14 PROCEDURE — 99999 PR PBB SHADOW E&M-EST. PATIENT-LVL IV: ICD-10-PCS | Mod: PBBFAC,,, | Performed by: FAMILY MEDICINE

## 2022-09-14 PROCEDURE — 81003 URINALYSIS AUTO W/O SCOPE: CPT | Performed by: EMERGENCY MEDICINE

## 2022-09-14 PROCEDURE — 3008F PR BODY MASS INDEX (BMI) DOCUMENTED: ICD-10-PCS | Mod: CPTII,S$GLB,, | Performed by: FAMILY MEDICINE

## 2022-09-14 PROCEDURE — 3075F SYST BP GE 130 - 139MM HG: CPT | Mod: CPTII,S$GLB,, | Performed by: FAMILY MEDICINE

## 2022-09-14 PROCEDURE — 71100 X-RAY EXAM RIBS UNI 2 VIEWS: CPT | Mod: 26,LT,, | Performed by: RADIOLOGY

## 2022-09-14 PROCEDURE — 3075F PR MOST RECENT SYSTOLIC BLOOD PRESS GE 130-139MM HG: ICD-10-PCS | Mod: CPTII,S$GLB,, | Performed by: FAMILY MEDICINE

## 2022-09-14 PROCEDURE — 71100 XR RIBS 2 VIEW LEFT: ICD-10-PCS | Mod: 26,LT,, | Performed by: RADIOLOGY

## 2022-09-14 PROCEDURE — 99214 PR OFFICE/OUTPT VISIT, EST, LEVL IV, 30-39 MIN: ICD-10-PCS | Mod: S$GLB,,, | Performed by: FAMILY MEDICINE

## 2022-09-14 PROCEDURE — 99214 OFFICE O/P EST MOD 30 MIN: CPT | Mod: S$GLB,,, | Performed by: FAMILY MEDICINE

## 2022-09-14 PROCEDURE — 99999 PR PBB SHADOW E&M-EST. PATIENT-LVL IV: CPT | Mod: PBBFAC,,, | Performed by: FAMILY MEDICINE

## 2022-09-14 RX ORDER — HYDROCODONE BITARTRATE AND ACETAMINOPHEN 5; 325 MG/1; MG/1
2 TABLET ORAL EVERY 4 HOURS PRN
Qty: 15 TABLET | Refills: 0 | Status: SHIPPED | OUTPATIENT
Start: 2022-09-14 | End: 2022-09-16 | Stop reason: CLARIF

## 2022-09-14 NOTE — FIRST PROVIDER EVALUATION
"Medical screening examination initiated.  I have conducted a focused provider triage encounter, findings are as follows:    Brief history of present illness:  61 yo with severe L rib and L side/flank pain. Fell off a tractor on Friday and hit bluntly on L side at lower part of ribs. Saw pcp today who ordered outpt CT a/p and he had rib xray that was negative.   In the car on the way home from pcp, he sneezed and felt sudden severe pop in L ribs and pain has increased since then.   No chest pain, sob, mid back pain, numbness, tingling or weakness.     Vitals:    09/14/22 1828   BP: (!) 158/88   Pulse: 69   Resp: 17   Temp: 98.2 °F (36.8 °C)   TempSrc: Oral   SpO2: 97%   Weight: 95.3 kg (210 lb)   Height: 5' 10" (1.778 m)       Pertinent physical exam:  calm. Polite. In pain with movement. No resp distress, no tachypnea, positive ttp to left lateral abdomen and lower ribs.     Brief workup plan:   I have introduced myself and initiated work up but have not assumed care. Pt understands that there is a wait for a bed in the ED at this time and once a bed is available, he will be moved to the back and cared for by a provider and nursing care team.      Preliminary workup initiated; this workup will be continued and followed by the physician or advanced practice provider that is assigned to the patient when roomed.  "

## 2022-09-14 NOTE — PROGRESS NOTES
"Subjective:       Patient ID: Pierre Grossman is a 62 y.o. male.    Chief Complaint: Fall    HPI    Pierre Grossman is a 62 y.o. male for prob focused visit.    Fell on Friday out of Bobcat and hit left side rib cage / lt abdominal pain. Denies appetite change, n/v, fever, changes with bowel habits or urine. Bruising in left side of abd. Hurts when laying on that side and then taking deep breath but otherwise is not feeling. Has not used any thing for pain.     Review of Systems   Gastrointestinal:  Positive for abdominal pain. Negative for constipation, diarrhea, nausea and vomiting.       Past Medical History:   Diagnosis Date    Head injury 1986    Nephrolithiasis         Prior to Admission medications    Medication Sig Start Date End Date Taking? Authorizing Provider   aspirin (ECOTRIN) 81 MG EC tablet Take 1 tablet (81 mg total) by mouth 2 (two) times daily. 10/3/18 10/12/21  Dejah Martínez PA-C   ibuprofen (ADVIL,MOTRIN) 200 MG tablet Take 200 mg by mouth every 6 (six) hours as needed for Pain.    Historical Provider   meclizine (ANTIVERT) 25 mg tablet Take 1 tablet (25 mg total) by mouth 3 (three) times daily as needed. 5/3/18   Socorro Payton MD   meclizine (ANTIVERT) 25 mg tablet Take 1 tablet (25 mg total) by mouth 3 (three) times daily as needed for Dizziness. 11/17/21   Raf Santo MD   methylPREDNISolone (MEDROL DOSEPACK) 4 mg tablet use as directed 11/15/21   Socorro Payton MD   pantoprazole (PROTONIX) 40 MG tablet TAKE ONE TABLET BY MOUTH ONCE DAILY 5/6/22   Socorro Payton MD        Past medical history, surgical history, and family medical history reviewed and updated as appropriate.    Medications and allergies reviewed.     Objective:          Vitals:    09/14/22 1544   BP: 134/86   BP Location: Left arm   Patient Position: Sitting   BP Method: Medium (Manual)   Pulse: 66   SpO2: (!) 94%   Weight: 95.3 kg (210 lb 1.6 oz)   Height: 5' 10" (1.778 m)     Body mass index is 30.15 " kg/m².  Physical Exam  Vitals and nursing note reviewed.   Constitutional:       General: He is not in acute distress.  Cardiovascular:      Rate and Rhythm: Normal rate.   Pulmonary:      Effort: Pulmonary effort is normal. No respiratory distress.   Abdominal:          Comments: Superficial bruising and tenderness in noted region but tenderness with palpation really across left side from lower rib cage to lower quadrant   Neurological:      Mental Status: He is alert.       Lab Results   Component Value Date    WBC 10.97 01/20/2020    HGB 15.8 01/20/2020    HCT 48.4 01/20/2020     01/20/2020    CHOL 244 (H) 01/20/2020    TRIG 95 01/20/2020    HDL 45 01/20/2020    ALT 37 01/20/2020    AST 24 01/20/2020     01/20/2020    K 4.9 01/20/2020     01/20/2020    CREATININE 1.3 01/20/2020    BUN 14 01/20/2020    CO2 25 01/20/2020    TSH 1.948 01/20/2020    PSA 1.8 01/20/2020    INR 1.0 09/24/2018    HGBA1C 5.1 01/20/2020       Assessment:       1. Abdominal pain, unspecified abdominal location          Plan:   1. Abdominal pain, unspecified abdominal location  -     X-Ray Ribs 2 View Left  -     CT Abdomen Pelvis  Without Contrast    Rec tylenol for pain    Follow up should symptoms persist or worsen. If symptoms become severe, please report immediately to urgent care or emergency room for further evaluation. Patient voiced understanding.      No follow-ups on file.    Ze Brooks MD  Family Medicine / Primary Care  [facility]  9/14/2022

## 2022-09-15 ENCOUNTER — TELEPHONE (OUTPATIENT)
Dept: FAMILY MEDICINE | Facility: CLINIC | Age: 62
End: 2022-09-15

## 2022-09-15 NOTE — DISCHARGE INSTRUCTIONS
Please follow-up with your primary care doctor.  Your blood pressure was elevated today but this is likely due to pain.  Have your doctor re-evaluated.  Return to the emergency department for any change or concerning symptoms.  Use your peak flow device several times a day.  You need to follow-up with her primary care doctor regarding the findings in your right lung.

## 2022-09-15 NOTE — TELEPHONE ENCOUNTER
----- Message from Juventino Sandoval sent at 9/15/2022 10:02 AM CDT -----  Type: Patient Call Back    Who called:Wife/ Eben    What is the request in detail: Pt has been seen in Urgent care and the ED. Wife states pt received a CT Scan and abnormalities were found. Please call wife.    Can the clinic reply by [facility]? no    Would the patient rather a call back or a response via [facility]? Call back    Best call back number: [phone]

## 2022-09-15 NOTE — TELEPHONE ENCOUNTER
Returned call to wife.  She is very concerned about lesion seen on CT scan in ER yetserday and also that  is still in extreme pain despite being on pain meds.  Visit scheduled tomorrow with NP but pt wife would like to know what else they need to do.

## 2022-09-15 NOTE — ED PROVIDER NOTES
"Encounter Date: 9/14/2022    SCRIBE #1 NOTE: I, Sherri Simmons, am scribing for, and in the presence of,  JAIRON Venegas. I have scribed the following portions of the note - Other sections scribed: HPI, ROS.     History     Chief Complaint   Patient presents with    Rib Injury     63 yo male to triage for left sided rib pain and left sided back pain. Pt says he had a rib injury on Friday of last week and while in the car he sneezed a felt a pop in his back. VSS, NAD, AAox4    Back Pain     Pierre Grossman is a 62 y.o. male, with no pertinent PMHx, who presents to the ED with rib injury onset prior to arrival. Patient reports the pain is constant, burning, and sore. Patient notes he fell off of his bobcat and his mask hit him in his left rib 5 days ago. He states he went to get X-rays done earlier today and everything came back normal. Patient endorses he sneezed in his car on the way home and felt like "something ripped." No other exacerbating or alleviating factors. Denies shortness of breath, chest pain, nausea, vomiting, diarrhea, constipation or other associated symptoms.      The history is provided by the patient.   Review of patient's allergies indicates:   Allergen Reactions    Iodinated contrast media Shortness Of Breath and Swelling     Past Medical History:   Diagnosis Date    Head injury 1986    Nephrolithiasis      Past Surgical History:   Procedure Laterality Date    CERVICAL FUSION      COLONOSCOPY N/A 5/30/2018    Procedure: COLONOSCOPY;  Surgeon: Zakia Mirza MD;  Location: [facility];  Service: Endoscopy;  Laterality: N/A;    EYE SURGERY      cataract    HERNIA REPAIR      TOTAL REPLACEMENT OF HIP JOINT USING COMPUTER-ASSISTED NAVIGATION Right 10/3/2018    Procedure: REPLACEMENT-HIP-TOTAL WITH NAVIGATION;  Surgeon: [facility] Jr., MD;  Location: [address];  Service: Orthopedics;  Laterality: Right;     Family History   Problem Relation Age of Onset    Aneurysm Mother         " brain    Pancreatitis Father     Heart attack Neg Hx     Hypertension Neg Hx      Social History     Tobacco Use    Smoking status: Never    Smokeless tobacco: Never   Substance Use Topics    Alcohol use: No    Drug use: No     Review of Systems   Constitutional:  Negative for fever.   HENT:  Negative for congestion, sore throat and trouble swallowing.    Respiratory:  Negative for cough and shortness of breath.    Cardiovascular:  Negative for chest pain.   Gastrointestinal:  Negative for abdominal pain, constipation, diarrhea, nausea and vomiting.   Genitourinary:  Negative for dysuria, flank pain, frequency and urgency.   Musculoskeletal:  Negative for back pain.        Positive for left rib injury.    Skin:  Negative for rash.   Neurological:  Negative for headaches.   All other systems reviewed and are negative.    Physical Exam     Initial Vitals [09/14/22 1828]   BP Pulse Resp Temp SpO2   (!) 158/88 69 17 98.2 °F (36.8 °C) 97 %      MAP       --         Physical Exam    Nursing note and vitals reviewed.  Constitutional: He appears well-developed and well-nourished. He is not diaphoretic. No distress.   HENT:   Head: Normocephalic and atraumatic.   Right Ear: External ear normal.   Left Ear: External ear normal.   Nose: Nose normal.   Mouth/Throat: Oropharynx is clear and moist.   Eyes: EOM are normal. Pupils are equal, round, and reactive to light.   Neck: Neck supple.   Normal range of motion.  Cardiovascular:  Normal rate.           Pulmonary/Chest: Breath sounds normal. No respiratory distress. He has no wheezes. He has no rhonchi. He has no rales.           Abdominal: Abdomen is soft. Bowel sounds are normal. He exhibits no distension. There is no abdominal tenderness. There is no rebound and no guarding.   Musculoskeletal:         General: No tenderness or edema. Normal range of motion.      Cervical back: Normal range of motion and neck supple.     Neurological: He is alert and oriented to person,  place, and time.   Skin: Skin is warm. Capillary refill takes less than 2 seconds.       ED Course   Procedures  Labs Reviewed   CBC W/ AUTO DIFFERENTIAL - Abnormal; Notable for the following components:       Result Value    Immature Granulocytes 0.7 (*)     Immature Grans (Abs) 0.08 (*)     Eos # 0.7 (*)     All other components within normal limits   URINALYSIS, REFLEX TO URINE CULTURE    Narrative:     Specimen Source->Urine   COMPREHENSIVE METABOLIC PANEL          Imaging Results              X-Ray Ribs 2 View Left (Final result)  Result time 09/14/22 20:05:49      Final result by Jaguar Hinkle MD (09/14/22 20:05:49)                   Impression:      1. No acute cardiopulmonary process, no acute displaced left rib fracture.      Electronically signed by: Jaguar Hinkle MD  Date:    09/14/2022  Time:    20:05               Narrative:    EXAMINATION:  XR CHEST 1 VIEW; XR RIBS 2 VIEW LEFT    CLINICAL HISTORY:  Unspecified fall, initial encounter    TECHNIQUE:  Single frontal view of the chest was performed.  Three views left ribs.    COMPARISON:  09/14/2022    FINDINGS:  Single-view chest, three views left ribs.    The cardiomediastinal silhouette is not enlarged.  There is no pleural effusion.  The trachea is midline.  The lungs are symmetrically expanded bilaterally with mildly coarse interstitial attenuation accentuated by shallow inspiratory effort..  No large focal consolidation seen.  There is no pneumothorax.  The osseous structures are remarkable for degenerative changes.  No acute displaced left rib fracture.  The left shoulder is intact..                                       X-Ray Chest 1 View (Final result)  Result time 09/14/22 20:05:49      Final result by Jaguar Hinkle MD (09/14/22 20:05:49)                   Impression:      1. No acute cardiopulmonary process, no acute displaced left rib fracture.      Electronically signed by: Jaguar Hinkle MD  Date:    09/14/2022  Time:    20:05                Narrative:    EXAMINATION:  XR CHEST 1 VIEW; XR RIBS 2 VIEW LEFT    CLINICAL HISTORY:  Unspecified fall, initial encounter    TECHNIQUE:  Single frontal view of the chest was performed.  Three views left ribs.    COMPARISON:  09/14/2022    FINDINGS:  Single-view chest, three views left ribs.    The cardiomediastinal silhouette is not enlarged.  There is no pleural effusion.  The trachea is midline.  The lungs are symmetrically expanded bilaterally with mildly coarse interstitial attenuation accentuated by shallow inspiratory effort..  No large focal consolidation seen.  There is no pneumothorax.  The osseous structures are remarkable for degenerative changes.  No acute displaced left rib fracture.  The left shoulder is intact..                                        CT Abdomen Pelvis  Without Contrast (Final result)  Result time 09/14/22 19:53:38      Final result by Yina Campos MD (09/14/22 19:53:38)                   Impression:      Nondisplaced left posterolateral 10th rib fracture.    3 mm right proximal ureteral calculus.  No hydronephrosis.    Small area of ground-glass opacity in the right upper quadrant.  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    Colonic diverticulosis.    1.4 cm right middle lobe irregular scarring versus lesion.  Recommend additional evaluation to exclude neoplasia.    This report was flagged in Epic as abnormal.      Electronically signed by: Yina Campos  Date:    09/14/2022  Time:    19:53               Narrative:    EXAMINATION:  CT ABDOMEN PELVIS WITHOUT    CLINICAL HISTORY:  Left-sided and left flank pain.  Fell off a tractor.    TECHNIQUE:  5 mm unenhanced axial images from the lung bases through the greater trochanters were performed.  Coronal and sagittal reformatted images were provided.    COMPARISON:  None.    FINDINGS:  There is a nondisplaced left posterolateral 10th rib fracture (series 602 sagittal image 188 and series 601 coronal image  111).    Within the limits of a noncontrast examination, the liver, spleen, pancreas, gallbladder and adrenal glands are atraumatic.    Bilateral nephrolithiasis is present.  A 3 mm nonobstructing calculus is seen in the right upper ureter.  The right largest calculus measures 6 mm at the lower pole.  There is a punctate left renal calculus (series 2 axial image 84).    Small area of ground-glass opacity is seen in the right upper quadrant mesentery.    There is no gross abdominal adenopathy or ascites.    Evaluation the pelvis is limited by streak artifact from right hip arthroplasty.  There are no pelvic masses or adenopathy.  There is no free pelvic fluid.  There is colonic diverticulosis.  There arm moderate bilateral fat containing inguinal hernias left greater than right.    At the lung bases, there is mild bibasilar atelectatic change.  There is a 1.4 cm questionable area of irregular scarring or lesion in the right middle lobe.  There is moderate bibasilar atelectatic changes.  There are subcentimeter nodular appearing densities abutting the fissures in the right lower lobe, which may represent perifissural lymph nodes or part of the normal pleural reflection.                                    X-Rays:   Independently Interpreted Readings:   Other Readings:  Chest x-ray and x-ray of the left ribs revealed no abnormalities.    CT abdomen pelvis reveals a nondisplaced left 10th rib fracture, a 3 mm right proximal ureteral calculus.  Colonic diverticulosis.  There is also 1.4 cm middle lobe irregular scarring versus lesion noted.  Medications - No data to display  Medical Decision Making:   History:   Old Medical Records: I decided to obtain old medical records.  Clinical Tests:   Lab Tests: Ordered and Reviewed  Radiological Study: Ordered and Reviewed     APC / Resident Notes:   This is an urgent evaluation of a 62-year-old male who presents to the emergency department after fall.      The patient is  currently afebrile and nontoxic in appearance.  Vital signs are stable.  On physical exam, there is tenderness along the left flank area.  However, there are clear breath sounds noted in all lobes of the lung.  The remaining physical exam is unremarkable.  He denies hitting his head, losing consciousness.  He denies any neck or back pain.      A x-ray of the chest and left lateral ribs were performed which revealed no abnormalities.  However CT abdomen pelvis revealed a left 10th rib fracture.  There is also evidence of a ureteral calculus and colonic diverticulosis.  There is also an abnormality noted in the right lobe of the middle lung.  All this will need to be re-evaluated.  At this time I will treat him with pain medications, muscle relaxers give him a peak flow.  Education was given regarding this.  Return precautions were also reviewed.  Signs and symptoms of worsening were thoroughly reviewed with the patient verbalized understanding and agreement.  He is currently safe and stable for discharge with PCP follow-up.  In addition his blood pressure was also mildly elevated.  This is likely secondary to pain but will need to be followed up by his primary care doctor.   Scribe Attestation:   Scribe #1: I performed the above scribed service and the documentation accurately describes the services I performed. I attest to the accuracy of the note.                   Clinical Impression:   Final diagnoses:  [W19.XXXA] Fall  [S22.32XA] Closed fracture of one rib of left side, initial encounter (Primary)  [N20.1] Ureterolithiasis  [R03.0] Elevated blood pressure reading        ED Disposition Condition    Discharge Stable        I, Katheryn Ramos PA-C, personally performed the services described in this documentation. All medical record entries made by the scribe were at my direction and in my presence. I have reviewed the chart and agree that the record reflects my personal performance and is accurate and complete.    ED Prescriptions       Medication Sig Dispense Start Date End Date Auth. Provider    HYDROcodone-acetaminophen (NORCO) 5-325 mg per tablet Take 2 tablets by mouth every 4 (four) hours as needed for Pain. 15 tablet 9/14/2022 9/17/2022 Katheryn Ramos PA-C          Follow-up Information       Follow up With Specialties Details Why Contact Info    Socorro Payton MD Family Medicine   [address] [facility]  [place] [address]  [phone]               Katheryn Ramos PA-C  09/14/22 [id number]

## 2022-09-16 ENCOUNTER — OFFICE VISIT (OUTPATIENT)
Dept: FAMILY MEDICINE | Facility: CLINIC | Age: 62
End: 2022-09-16
Payer: COMMERCIAL

## 2022-09-16 VITALS
HEIGHT: 70 IN | OXYGEN SATURATION: 96 % | DIASTOLIC BLOOD PRESSURE: 86 MMHG | HEART RATE: 75 BPM | WEIGHT: 212.5 LBS | SYSTOLIC BLOOD PRESSURE: 110 MMHG | TEMPERATURE: 98 F | RESPIRATION RATE: 16 BRPM | BODY MASS INDEX: 30.42 KG/M2

## 2022-09-16 DIAGNOSIS — K59.00 ACUTE CONSTIPATION: ICD-10-CM

## 2022-09-16 DIAGNOSIS — R93.5 ABNORMAL CT OF THE ABDOMEN: ICD-10-CM

## 2022-09-16 DIAGNOSIS — S22.32XD CLOSED FRACTURE OF ONE RIB OF LEFT SIDE WITH ROUTINE HEALING, SUBSEQUENT ENCOUNTER: Primary | ICD-10-CM

## 2022-09-16 PROCEDURE — 3008F BODY MASS INDEX DOCD: CPT | Mod: CPTII,S$GLB,, | Performed by: NURSE PRACTITIONER

## 2022-09-16 PROCEDURE — 1160F RVW MEDS BY RX/DR IN RCRD: CPT | Mod: CPTII,S$GLB,, | Performed by: NURSE PRACTITIONER

## 2022-09-16 PROCEDURE — 3079F DIAST BP 80-89 MM HG: CPT | Mod: CPTII,S$GLB,, | Performed by: NURSE PRACTITIONER

## 2022-09-16 PROCEDURE — 1159F MED LIST DOCD IN RCRD: CPT | Mod: CPTII,S$GLB,, | Performed by: NURSE PRACTITIONER

## 2022-09-16 PROCEDURE — 1159F PR MEDICATION LIST DOCUMENTED IN MEDICAL RECORD: ICD-10-PCS | Mod: CPTII,S$GLB,, | Performed by: NURSE PRACTITIONER

## 2022-09-16 PROCEDURE — 3008F PR BODY MASS INDEX (BMI) DOCUMENTED: ICD-10-PCS | Mod: CPTII,S$GLB,, | Performed by: NURSE PRACTITIONER

## 2022-09-16 PROCEDURE — 99999 PR PBB SHADOW E&M-EST. PATIENT-LVL V: CPT | Mod: PBBFAC,,, | Performed by: NURSE PRACTITIONER

## 2022-09-16 PROCEDURE — 1160F PR REVIEW ALL MEDS BY PRESCRIBER/CLIN PHARMACIST DOCUMENTED: ICD-10-PCS | Mod: CPTII,S$GLB,, | Performed by: NURSE PRACTITIONER

## 2022-09-16 PROCEDURE — 3079F PR MOST RECENT DIASTOLIC BLOOD PRESSURE 80-89 MM HG: ICD-10-PCS | Mod: CPTII,S$GLB,, | Performed by: NURSE PRACTITIONER

## 2022-09-16 PROCEDURE — 99214 PR OFFICE/OUTPT VISIT, EST, LEVL IV, 30-39 MIN: ICD-10-PCS | Mod: S$GLB,,, | Performed by: NURSE PRACTITIONER

## 2022-09-16 PROCEDURE — 3074F PR MOST RECENT SYSTOLIC BLOOD PRESSURE < 130 MM HG: ICD-10-PCS | Mod: CPTII,S$GLB,, | Performed by: NURSE PRACTITIONER

## 2022-09-16 PROCEDURE — 99999 PR PBB SHADOW E&M-EST. PATIENT-LVL V: ICD-10-PCS | Mod: PBBFAC,,, | Performed by: NURSE PRACTITIONER

## 2022-09-16 PROCEDURE — 3074F SYST BP LT 130 MM HG: CPT | Mod: CPTII,S$GLB,, | Performed by: NURSE PRACTITIONER

## 2022-09-16 PROCEDURE — 99214 OFFICE O/P EST MOD 30 MIN: CPT | Mod: S$GLB,,, | Performed by: NURSE PRACTITIONER

## 2022-09-16 RX ORDER — METHOCARBAMOL 750 MG/1
750 TABLET, FILM COATED ORAL
Qty: 60 TABLET | Refills: 0 | Status: SHIPPED | OUTPATIENT
Start: 2022-09-16

## 2022-09-16 RX ORDER — ONDANSETRON 8 MG/1
8 TABLET, ORALLY DISINTEGRATING ORAL EVERY 8 HOURS PRN
Qty: 30 TABLET | Refills: 0 | Status: SHIPPED | OUTPATIENT
Start: 2022-09-16

## 2022-09-16 RX ORDER — OXYCODONE AND ACETAMINOPHEN 7.5; 325 MG/1; MG/1
1 TABLET ORAL EVERY 4 HOURS PRN
Qty: 30 TABLET | Refills: 0 | Status: SHIPPED | OUTPATIENT
Start: 2022-09-16

## 2022-09-16 NOTE — PROGRESS NOTES
"Subjective:      Patient ID: Pierre Grossman is a 62 y.o. male.  New to me but seen previously in clinic by a fellow provider. Pt presents to clinic for ER f/u, see course below. Reports persistent left flank pain with worsening GERD and constipation from pain meds. Did have abnormal CT and is concerned about possible neoplasm.       ER VISIT 9/14/2022:  · Rib Injury      63 yo male to triage for left sided rib pain and left sided back pain. Pt says he had a rib injury on Friday of last week and while in the car he sneezed a felt a pop in his back. VSS, NAD, AAox4  · Back Pain     Pierre Grossman is a 62 y.o. male, with no pertinent PMHx, who presents to the ED with rib injury onset prior to arrival. Patient reports the pain is constant, burning, and sore. Patient notes he fell off of his bobcat and his mask hit him in his left rib 5 days ago. He states he went to get X-rays done earlier today and everything came back normal. Patient endorses he sneezed in his car on the way home and felt like "something ripped." No other exacerbating or alleviating factors. Denies shortness of breath, chest pain, nausea, vomiting, diarrhea, constipation or other associated symptoms.      Imaging Results     CT Abdomen Pelvis  Without Contrast (Final result)  Result time 09/14/22 19:53:38    Impression:  Nondisplaced left posterolateral 10th rib fracture.  3 mm right proximal ureteral calculus.  No hydronephrosis.  Small area of ground-glass opacity in the right upper quadrant.  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  Colonic diverticulosis.  1.4 cm right middle lobe irregular scarring versus lesion.  Recommend additional evaluation to exclude neoplasia.    FINDINGS:  There is a nondisplaced left posterolateral 10th rib fracture (series 602 sagittal image 188 and series 601 coronal image 111).  Within the limits of a noncontrast examination, the liver, spleen, pancreas, gallbladder and adrenal glands are atraumatic.  Bilateral " nephrolithiasis is present.  A 3 mm nonobstructing calculus is seen in the right upper ureter.  The right largest calculus measures 6 mm at the lower pole.  There is a punctate left renal calculus (series 2 axial image 84).  Small area of ground-glass opacity is seen in the right upper quadrant mesentery.  There is no gross abdominal adenopathy or ascites.  Evaluation the pelvis is limited by streak artifact from right hip arthroplasty.  There are no pelvic masses or adenopathy.  There is no free pelvic fluid.  There is colonic diverticulosis.  There arm moderate bilateral fat containing inguinal hernias left greater than right.  At the lung bases, there is mild bibasilar atelectatic change.  There is a 1.4 cm questionable area of irregular scarring or lesion in the right middle lobe.  There is moderate bibasilar atelectatic changes.  There are subcentimeter nodular appearing densities abutting the fissures in the right lower lobe, which may represent perifissural lymph nodes or part of the normal pleural reflection.      X-Rays:   Independently Interpreted Readings:   Other Readings:  Chest x-ray and x-ray of the left ribs revealed no abnormalities.     CT abdomen pelvis reveals a nondisplaced left 10th rib fracture, a 3 mm right proximal ureteral calculus.  Colonic diverticulosis.  There is also 1.4 cm middle lobe irregular scarring versus lesion noted.  Medications - No data to display  Medical Decision Making:   History:   Old Medical Records: I decided to obtain old medical records.  Clinical Tests:   Lab Tests: Ordered and Reviewed  Radiological Study: Ordered and Reviewed     APC / Resident Notes:   This is an urgent evaluation of a 62-year-old male who presents to the emergency department after fall.       The patient is currently afebrile and nontoxic in appearance.  Vital signs are stable.  On physical exam, there is tenderness along the left flank area.  However, there are clear breath sounds noted in all  lobes of the lung.  The remaining physical exam is unremarkable.  He denies hitting his head, losing consciousness.  He denies any neck or back pain.       A x-ray of the chest and left lateral ribs were performed which revealed no abnormalities.  However CT abdomen pelvis revealed a left 10th rib fracture.  There is also evidence of a ureteral calculus and colonic diverticulosis.  There is also an abnormality noted in the right lobe of the middle lung.  All this will need to be re-evaluated.  At this time I will treat him with pain medications, muscle relaxers give him a peak flow.  Education was given regarding this.  Return precautions were also reviewed.  Signs and symptoms of worsening were thoroughly reviewed with the patient verbalized understanding and agreement.  He is currently safe and stable for discharge with PCP follow-up.  In addition his blood pressure was also mildly elevated.  This is likely secondary to pain but will need to be followed up by his primary care doctor.     Review of Systems   Constitutional:  Negative for activity change, appetite change, fever and unexpected weight change.   HENT:  Negative for nasal congestion, ear pain, postnasal drip, rhinorrhea, sneezing, sore throat and voice change.    Eyes:  Negative for pain and visual disturbance.   Respiratory:  Positive for shortness of breath (due to pain). Negative for cough, chest tightness and wheezing.    Cardiovascular:  Negative for chest pain, palpitations, leg swelling and claudication.   Gastrointestinal:  Positive for abdominal distention, change in bowel habit, constipation, reflux and change in bowel habit. Negative for abdominal pain, anal bleeding, blood in stool, diarrhea, nausea, rectal pain, vomiting and fecal incontinence.   Endocrine: Negative.    Genitourinary:  Negative for difficulty urinating.   Musculoskeletal:  Positive for arthralgias, back pain and myalgias. Negative for gait problem, joint swelling, leg pain,  "neck pain, neck stiffness and joint deformity.   Integumentary:  Positive for color change (bruising). Negative for rash and wound.   Allergic/Immunologic: Negative.    Neurological:  Negative for speech difficulty and headaches.   Hematological: Negative.    Psychiatric/Behavioral: Negative.     All other systems reviewed and are negative.      Objective:     Vitals:    09/16/22 0927   BP: 110/86   Pulse: 75   Resp: 16   Temp: 97.7 °F (36.5 °C)   TempSrc: Oral   SpO2: 96%   Weight: 96.4 kg (212 lb 8.4 oz)   Height: 5' 10" (1.778 m)     Physical Exam  Vitals and nursing note reviewed.   Constitutional:       General: He is not in acute distress.     Appearance: Normal appearance. He is well-developed, well-groomed and overweight. He is not ill-appearing.   HENT:      Head: Normocephalic and atraumatic.      Right Ear: External ear normal.      Left Ear: External ear normal.      Nose: Nose normal.      Mouth/Throat:      Lips: Pink.      Mouth: Mucous membranes are moist.   Eyes:      General: Lids are normal. Vision grossly intact. Gaze aligned appropriately.      Conjunctiva/sclera: Conjunctivae normal.      Pupils: Pupils are equal, round, and reactive to light.   Neck:      Trachea: Phonation normal.   Cardiovascular:      Rate and Rhythm: Normal rate and regular rhythm.      Heart sounds: Normal heart sounds.   Pulmonary:      Effort: Pulmonary effort is normal. No accessory muscle usage or respiratory distress.      Breath sounds: Normal air entry. No decreased air movement. Examination of the right-lower field reveals decreased breath sounds. Examination of the left-lower field reveals decreased breath sounds. Decreased breath sounds present. No wheezing, rhonchi or rales.   Abdominal:      General: Abdomen is flat. Bowel sounds are decreased. There is distension (mildly).      Palpations: Abdomen is soft.      Tenderness: There is abdominal tenderness in the left upper quadrant. There is left CVA tenderness " and guarding. There is no right CVA tenderness or rebound.   Musculoskeletal:      Cervical back: Neck supple.      Right lower leg: No edema.      Left lower leg: No edema.   Skin:     General: Skin is warm and dry.      Findings: Bruising (left flank) present. No rash.   Neurological:      General: No focal deficit present.      Mental Status: He is alert and oriented to person, place, and time. Mental status is at baseline.      Sensory: Sensation is intact.      Motor: Motor function is intact.      Coordination: Coordination is intact.      Gait: Gait is intact.   Psychiatric:         Attention and Perception: Attention and perception normal.         Mood and Affect: Mood and affect normal.         Speech: Speech normal.         Behavior: Behavior normal. Behavior is cooperative.         Thought Content: Thought content normal.         Cognition and Memory: Cognition and memory normal.         Judgment: Judgment normal.     Assessment and Plan:     1. Closed fracture of one rib of left side with routine healing, subsequent encounter  No acute respiratory distress  Home care discussed and written education provided  - oxyCODONE-acetaminophen (PERCOCET) 7.5-325 mg per tablet; Take 1 tablet by mouth every 4 (four) hours as needed for Pain.  Dispense: 30 tablet; Refill: 0  - methocarbamoL (ROBAXIN) 750 MG Tab; Take 1 tablet (750 mg total) by mouth every 6 to 8 hours as needed (MUSCLE SPASM).  Dispense: 60 tablet; Refill: 0    2. Acute constipation  2/2 to narcotic use  Education about constipation causes, emergency precautions, medications, home care and treatment discussed.  - ondansetron (ZOFRAN-ODT) 8 MG TbDL; Take 1 tablet (8 mg total) by mouth every 8 (eight) hours as needed (NAUSEA).  Dispense: 30 tablet; Refill: 0  - linaCLOtide (LINZESS) 72 mcg Cap capsule; Take 1 capsule (72 mcg total) by mouth before breakfast.  Dispense: 30 capsule; Refill: 1    3. Abnormal CT of the abdomen  Asymptomatic incidental  findings, pt to f/u with GI an Pulm for further evaluation   - Ambulatory referral/consult to Gastroenterology; Future  - Ambulatory referral/consult to Pulmonology; Future      HARLEY Thompson, FNP-C  Family/Internal Medicine  [facility]

## 2022-09-23 NOTE — TELEPHONE ENCOUNTER
55 yo F with PMHx of HTN, DM, prior stroke in 2017 with residual left sided weakness, who presented with RLE cellulitis. Stroke code called on 8/5 for unresponsiveness.  MR brain showed scattered punctate acute ischemic strokes in multiple territories with encephalopathy .    # acute ischemic infarcts  # high suspicion for vasculitis  - continue prednisone  - MRI brain wwo contrast when stable  - Routine EEG   Needs a urine culture and renal ultrasound due to blood noted in urine   55 yo F with PMHx of HTN, DM, prior stroke in 2017 with residual left sided weakness, who presented with RLE cellulitis. Stroke code called on 8/5 for unresponsiveness.  MR brain showed scattered punctate acute ischemic strokes in multiple territories with encephalopathy .    # acute ischemic infarcts  # high suspicion for vasculitis  - continue prednisone taper  - MRI brain wwo contrast when stable  - Routine EEG   55 yo F with PMHx of HTN, DM, prior stroke in 2017 with residual left sided weakness, who presented with RLE cellulitis. Stroke code called on 8/5 for unresponsiveness.  MR brain showed scattered punctate acute ischemic strokes in multiple territories with encephalopathy. 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 Currently on prednisone taper. Medical status complicated by Multiple bleeding, Kidney failure and reintubation.     # acute ischemic infarcts suspected for for vasculitis  - Received IV steroid 3 days and placed on prednisone 60 mg   - continue prednisone taper down to 40 mg for one week   - Repeat CT head showed no acute changes   - MRI brain wwo contrast when stable  - Routine EEG  - Will follow.

## 2022-09-28 ENCOUNTER — OFFICE VISIT (OUTPATIENT)
Dept: GASTROENTEROLOGY | Facility: CLINIC | Age: 62
End: 2022-09-28
Payer: COMMERCIAL

## 2022-09-28 VITALS
WEIGHT: 210.56 LBS | DIASTOLIC BLOOD PRESSURE: 98 MMHG | HEART RATE: 67 BPM | BODY MASS INDEX: 30.14 KG/M2 | SYSTOLIC BLOOD PRESSURE: 159 MMHG | HEIGHT: 70 IN

## 2022-09-28 DIAGNOSIS — K21.9 GASTROESOPHAGEAL REFLUX DISEASE, UNSPECIFIED WHETHER ESOPHAGITIS PRESENT: Primary | ICD-10-CM

## 2022-09-28 DIAGNOSIS — R93.5 ABNORMAL CT OF THE ABDOMEN: ICD-10-CM

## 2022-09-28 PROCEDURE — 1160F PR REVIEW ALL MEDS BY PRESCRIBER/CLIN PHARMACIST DOCUMENTED: ICD-10-PCS | Mod: CPTII,S$GLB,, | Performed by: INTERNAL MEDICINE

## 2022-09-28 PROCEDURE — 99203 PR OFFICE/OUTPT VISIT, NEW, LEVL III, 30-44 MIN: ICD-10-PCS | Mod: S$GLB,,, | Performed by: INTERNAL MEDICINE

## 2022-09-28 PROCEDURE — 3008F PR BODY MASS INDEX (BMI) DOCUMENTED: ICD-10-PCS | Mod: CPTII,S$GLB,, | Performed by: INTERNAL MEDICINE

## 2022-09-28 PROCEDURE — 3008F BODY MASS INDEX DOCD: CPT | Mod: CPTII,S$GLB,, | Performed by: INTERNAL MEDICINE

## 2022-09-28 PROCEDURE — 1159F MED LIST DOCD IN RCRD: CPT | Mod: CPTII,S$GLB,, | Performed by: INTERNAL MEDICINE

## 2022-09-28 PROCEDURE — 3077F SYST BP >= 140 MM HG: CPT | Mod: CPTII,S$GLB,, | Performed by: INTERNAL MEDICINE

## 2022-09-28 PROCEDURE — 1160F RVW MEDS BY RX/DR IN RCRD: CPT | Mod: CPTII,S$GLB,, | Performed by: INTERNAL MEDICINE

## 2022-09-28 PROCEDURE — 1159F PR MEDICATION LIST DOCUMENTED IN MEDICAL RECORD: ICD-10-PCS | Mod: CPTII,S$GLB,, | Performed by: INTERNAL MEDICINE

## 2022-09-28 PROCEDURE — 99999 PR PBB SHADOW E&M-EST. PATIENT-LVL V: CPT | Mod: PBBFAC,,, | Performed by: INTERNAL MEDICINE

## 2022-09-28 PROCEDURE — 3080F PR MOST RECENT DIASTOLIC BLOOD PRESSURE >= 90 MM HG: ICD-10-PCS | Mod: CPTII,S$GLB,, | Performed by: INTERNAL MEDICINE

## 2022-09-28 PROCEDURE — 99203 OFFICE O/P NEW LOW 30 MIN: CPT | Mod: S$GLB,,, | Performed by: INTERNAL MEDICINE

## 2022-09-28 PROCEDURE — 99999 PR PBB SHADOW E&M-EST. PATIENT-LVL V: ICD-10-PCS | Mod: PBBFAC,,, | Performed by: INTERNAL MEDICINE

## 2022-09-28 PROCEDURE — 3080F DIAST BP >= 90 MM HG: CPT | Mod: CPTII,S$GLB,, | Performed by: INTERNAL MEDICINE

## 2022-09-28 PROCEDURE — 3077F PR MOST RECENT SYSTOLIC BLOOD PRESSURE >= 140 MM HG: ICD-10-PCS | Mod: CPTII,S$GLB,, | Performed by: INTERNAL MEDICINE

## 2022-09-28 NOTE — PROGRESS NOTES
Subjective:       Patient ID: Pierre Grossman is a 62 y.o. male.    Chief Complaint:  recent CT was abnormal    HPI    62-year-old gentleman.  Referred because of abnormal imaging on CT scan.  Recently had a CT scan because of flank pain.  And indeed it did find a kidney stone.  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    He does not have any gastrointestinal symptoms other than a longstanding history of reflux.  He describes typical pyrosis.  It would be present on most days if it were not for pantoprazole.  He takes pantoprazole on most days.  And this gives him good relief.  But if he misses a dose he will probably have recurrence of reflux.  Reflux can wake him at night.  He denies regurgitation.  He denies dysphagia.  No epigastric or abdominal pain.  He typically has normal bowel movements 1 every day.  No change in his bowels.  No blood in the stool.      Family history  Father had pancreatic cancer no other members of the family have had pancreatic cancer    Colonoscopy the patient did have a normal colonoscopy in 2018 and was encouraged to have follow-up in 10 years.    Past Medical History:   Diagnosis Date    Head injury 1986    Nephrolithiasis        Review of patient's allergies indicates:   Allergen Reactions    Iodinated contrast media Shortness Of Breath and Swelling        Family History   Problem Relation Age of Onset    Aneurysm Mother         brain    Pancreatitis Father     Heart attack Neg Hx     Hypertension Neg Hx     Colon cancer Neg Hx     Esophageal cancer Neg Hx        Social History     Tobacco Use    Smoking status: Never    Smokeless tobacco: Never   Substance Use Topics    Alcohol use: No    Drug use: No        Review of Systems   Constitutional:  Negative for activity change, appetite change, chills, diaphoresis, fatigue, fever and unexpected weight change.   HENT:  Negative for nasal congestion, ear  pain, mouth sores, rhinorrhea, sinus pressure/congestion, sneezing, sore throat, trouble swallowing and voice change.    Eyes:  Negative for pain.   Respiratory:  Negative for cough and shortness of breath.    Cardiovascular:  Negative for chest pain, palpitations and leg swelling.   Genitourinary:  Negative for difficulty urinating and flank pain.   Musculoskeletal:  Negative for arthralgias, back pain, gait problem, joint swelling, myalgias and neck pain.   Integumentary:  Negative for rash.   Neurological:  Negative for dizziness, tremors, syncope, numbness and headaches.   Hematological:  Negative for adenopathy. Does not bruise/bleed easily.   Psychiatric/Behavioral:  Negative for agitation, behavioral problems, confusion, decreased concentration and dysphoric mood.      CMP   Lab Results   Component Value Date     09/14/2022    K 4.5 09/14/2022     09/14/2022    CO2 25 09/14/2022    GLU 93 09/14/2022    BUN 15 09/14/2022    CREATININE 1.2 09/14/2022    CALCIUM 10.0 09/14/2022    PROT 8.3 09/14/2022    ALBUMIN 4.0 09/14/2022    BILITOT 0.6 09/14/2022    ALKPHOS 109 09/14/2022    AST 22 09/14/2022    ALT 33 09/14/2022    ANIONGAP 11 09/14/2022    and CBC   Lab Results   Component Value Date    WBC 11.33 09/14/2022    HGB 15.8 09/14/2022    HCT 46.6 09/14/2022     09/14/2022       No results found for this or any previous visit from the past 365 days.             Objective:      Physical Exam  Constitutional:       General: He is not in acute distress.     Appearance: Normal appearance. He is well-developed. He is not diaphoretic.   HENT:      Right Ear: External ear normal.      Left Ear: External ear normal.      Nose: Nose normal.      Mouth/Throat:      Pharynx: No oropharyngeal exudate.   Eyes:      General: No scleral icterus.     Conjunctiva/sclera: Conjunctivae normal.      Pupils: Pupils are equal, round, and reactive to light.   Neck:      Thyroid: No thyromegaly.   Cardiovascular:       Rate and Rhythm: Normal rate.      Heart sounds: Normal heart sounds. No murmur heard.    No gallop.   Pulmonary:      Effort: Pulmonary effort is normal.      Breath sounds: Normal breath sounds. No wheezing.   Abdominal:      General: Bowel sounds are normal. There is no distension.      Palpations: Abdomen is soft. Abdomen is not rigid. There is no fluid wave or mass.      Tenderness: There is no abdominal tenderness. There is no guarding or rebound.   Genitourinary:     Comments: recent  Musculoskeletal:         General: No tenderness. Normal range of motion.      Cervical back: Normal range of motion and neck supple.   Lymphadenopathy:      Cervical: No cervical adenopathy.   Skin:     General: Skin is warm.      Findings: No rash.   Neurological:      Mental Status: He is alert and oriented to person, place, and time.      Cranial Nerves: No cranial nerve deficit.      Deep Tendon Reflexes: Reflexes are normal and symmetric.   Psychiatric:         Behavior: Behavior normal.         Thought Content: Thought content normal.       Assessment & Plan:       Gastroesophageal reflux disease, unspecified whether esophagitis present  -     Ambulatory referral/consult to Endo Procedure ; Future; Expected date: 09/29/2022    Abnormal CT of the abdomen  -     Ambulatory referral/consult to Gastroenterology       Assessment.  1. Gastroesophageal reflux.  Because the longstanding symptoms I recommended EGD just to rule out esophagitis and/or Shankar's esophagus.  For now he can continue his pantoprazole the way he is taking it  2. Abnormal CT scan.  The very isolated in focal area of possible mesenteric panniculitis is not of clinical concern.  If in the years to come there is any more evolution of symptoms such that there is significant abdominal pain, then we can consider reimaging the abdomen.  But for now I do not recommend any specific treatment or workup    This note was created with voice recognition  dictation technology.  There may be errors that I did not see, detect or correct.    Alan Malcolm MD

## 2022-10-20 NOTE — TELEPHONE ENCOUNTER
----- Message from Halie Mckeon RN sent at 9/28/2018  8:56 AM CDT -----  I see a UA and cx ordered and an ekg.  Do you want patient to get that done today before the US at 4:15.    Halie  ----- Message -----  From: Halie Mckeon RN  Sent: 9/25/2018   9:28 AM  To: [facility] Staff    Patient had UA done yesterday(ordered by anesthesia).  Anesthesia would like you to review and indicate if any further workup is required.    Halie Mckeon RN  [facility]  [id number]       room air

## 2023-01-25 ENCOUNTER — CLINICAL SUPPORT (OUTPATIENT)
Dept: ENDOSCOPY | Facility: HOSPITAL | Age: 63
End: 2023-01-25
Attending: INTERNAL MEDICINE
Payer: COMMERCIAL

## 2023-01-25 DIAGNOSIS — K21.9 GASTROESOPHAGEAL REFLUX DISEASE, UNSPECIFIED WHETHER ESOPHAGITIS PRESENT: ICD-10-CM

## 2023-01-25 NOTE — PLAN OF CARE
We attempted to reach you for your scheduled PAT appointment x 2 calls but you were not available. Please contact Endoscopy Scheduling at # [phone] or visit your [facility] portal to reschedule your PAT appointment.

## 2023-02-17 ENCOUNTER — OFFICE VISIT (OUTPATIENT)
Dept: FAMILY MEDICINE | Facility: CLINIC | Age: 63
End: 2023-02-17
Payer: COMMERCIAL

## 2023-02-17 VITALS
SYSTOLIC BLOOD PRESSURE: 138 MMHG | HEIGHT: 70 IN | BODY MASS INDEX: 30.62 KG/M2 | TEMPERATURE: 98 F | DIASTOLIC BLOOD PRESSURE: 80 MMHG | RESPIRATION RATE: 17 BRPM | OXYGEN SATURATION: 97 % | WEIGHT: 213.88 LBS | HEART RATE: 70 BPM

## 2023-02-17 DIAGNOSIS — L60.0 INGROWN LEFT GREATER TOENAIL: Primary | ICD-10-CM

## 2023-02-17 PROCEDURE — 1160F RVW MEDS BY RX/DR IN RCRD: CPT | Mod: CPTII,S$GLB,, | Performed by: NURSE PRACTITIONER

## 2023-02-17 PROCEDURE — 1160F PR REVIEW ALL MEDS BY PRESCRIBER/CLIN PHARMACIST DOCUMENTED: ICD-10-PCS | Mod: CPTII,S$GLB,, | Performed by: NURSE PRACTITIONER

## 2023-02-17 PROCEDURE — 99999 PR PBB SHADOW E&M-EST. PATIENT-LVL IV: CPT | Mod: PBBFAC,,, | Performed by: NURSE PRACTITIONER

## 2023-02-17 PROCEDURE — 99999 PR PBB SHADOW E&M-EST. PATIENT-LVL IV: ICD-10-PCS | Mod: PBBFAC,,, | Performed by: NURSE PRACTITIONER

## 2023-02-17 PROCEDURE — 3008F BODY MASS INDEX DOCD: CPT | Mod: CPTII,S$GLB,, | Performed by: NURSE PRACTITIONER

## 2023-02-17 PROCEDURE — 3079F PR MOST RECENT DIASTOLIC BLOOD PRESSURE 80-89 MM HG: ICD-10-PCS | Mod: CPTII,S$GLB,, | Performed by: NURSE PRACTITIONER

## 2023-02-17 PROCEDURE — 3075F PR MOST RECENT SYSTOLIC BLOOD PRESS GE 130-139MM HG: ICD-10-PCS | Mod: CPTII,S$GLB,, | Performed by: NURSE PRACTITIONER

## 2023-02-17 PROCEDURE — 99214 OFFICE O/P EST MOD 30 MIN: CPT | Mod: S$GLB,,, | Performed by: NURSE PRACTITIONER

## 2023-02-17 PROCEDURE — 3008F PR BODY MASS INDEX (BMI) DOCUMENTED: ICD-10-PCS | Mod: CPTII,S$GLB,, | Performed by: NURSE PRACTITIONER

## 2023-02-17 PROCEDURE — 3079F DIAST BP 80-89 MM HG: CPT | Mod: CPTII,S$GLB,, | Performed by: NURSE PRACTITIONER

## 2023-02-17 PROCEDURE — 1159F MED LIST DOCD IN RCRD: CPT | Mod: CPTII,S$GLB,, | Performed by: NURSE PRACTITIONER

## 2023-02-17 PROCEDURE — 1159F PR MEDICATION LIST DOCUMENTED IN MEDICAL RECORD: ICD-10-PCS | Mod: CPTII,S$GLB,, | Performed by: NURSE PRACTITIONER

## 2023-02-17 PROCEDURE — 3075F SYST BP GE 130 - 139MM HG: CPT | Mod: CPTII,S$GLB,, | Performed by: NURSE PRACTITIONER

## 2023-02-17 PROCEDURE — 99214 PR OFFICE/OUTPT VISIT, EST, LEVL IV, 30-39 MIN: ICD-10-PCS | Mod: S$GLB,,, | Performed by: NURSE PRACTITIONER

## 2023-02-17 RX ORDER — CLINDAMYCIN HYDROCHLORIDE 300 MG/1
300 CAPSULE ORAL EVERY 8 HOURS
Qty: 30 CAPSULE | Refills: 0 | Status: SHIPPED | OUTPATIENT
Start: 2023-02-17

## 2023-02-23 ENCOUNTER — TELEPHONE (OUTPATIENT)
Dept: ADMINISTRATIVE | Facility: HOSPITAL | Age: 63
End: 2023-02-23
Payer: COMMERCIAL

## 2023-02-23 NOTE — TELEPHONE ENCOUNTER
Attempted to contact patient (3 attempts) for scheduling a podiatry appointment/ no answer and voicemail box is full

## 2023-03-02 NOTE — PROGRESS NOTES
"Subjective:      Patient ID: Pierre Grossman is a 63 y.o. male.  Pt presents to clinic with recurrent left great toe pain, has long hx of ingrown toe nails, today with worsening pain and swelling. Denies trauma, injury or fall.     Review of Systems   Constitutional:  Negative for activity change, appetite change, fever and unexpected weight change.   HENT:  Negative for nasal congestion, ear pain, postnasal drip, rhinorrhea, sneezing, sore throat and voice change.    Eyes:  Negative for pain and visual disturbance.   Respiratory:  Negative for cough, chest tightness and shortness of breath.    Cardiovascular:  Negative for chest pain, palpitations and leg swelling.   Gastrointestinal:  Negative for abdominal pain, change in bowel habit, constipation, diarrhea, nausea, vomiting and change in bowel habit.   Endocrine: Negative.    Genitourinary:  Negative for difficulty urinating.   Musculoskeletal:  Positive for gait problem, joint swelling and joint deformity. Negative for arthralgias, back pain, leg pain, myalgias, neck pain and neck stiffness.   Integumentary:  Positive for color change. Negative for rash.   Allergic/Immunologic: Negative.    Neurological:  Negative for speech difficulty and headaches.   Hematological: Negative.    Psychiatric/Behavioral: Negative.     All other systems reviewed and are negative.      Objective:     Vitals:    02/17/23 1141   BP: 138/80   BP Location: Left arm   Patient Position: Sitting   BP Method: Large (Manual)   Pulse: 70   Resp: 17   Temp: 98 °F (36.7 °C)   TempSrc: Oral   SpO2: 97%   Weight: 97 kg (213 lb 13.5 oz)   Height: 5' 10" (1.778 m)     Physical Exam  Vitals and nursing note reviewed.   Constitutional:       General: He is not in acute distress.     Appearance: Normal appearance. He is well-developed and well-groomed. He is not ill-appearing.   HENT:      Head: Normocephalic and atraumatic.      Right Ear: External ear normal.      Left Ear: External ear normal.     " EXAM DESCRIPTION:  CT - Abdomen   Pelvis W Contrast - 8/4/2022 7:19 pm

 

CLINICAL HISTORY:  abd pain

 

COMPARISON:  No comparisons

 

TECHNIQUE:  Biphasic, helical CT imaging of the abdomen and pelvis was performed following 100 ml non
-ionic IV contrast.

 

No oral contrast administered.

 

All CT scans are performed using dose optimization technique as appropriate and may include automated
 exposure control or mA/KV adjustment according to patient size.

 

FINDINGS:  No suspicious findings in the lung bases.

 

The liver, spleen, and pancreas show no suspicious findings. Gallbladder and biliary tree are also wi
thout suspicious finding.

 

Symmetric renal function is seen with no hydronephrosis or suspicious renal mass. No pyelonephritis o
r acute parenchymal process. No bladder abnormalities. No adrenal abnormalities. Uterus and ovaries s
how no suspicious findings. There is a physiologic quantity of free fluid in the cul de sac.

 

No dilated bowel loops or bowel wall thickening. Moderate stool volume is seen filling but not dilati
ng most of the colon. No direct or indirect evidence for appendicitis. No free air or pneumatosis.  N
o hernia, mass or bulky lymphadenopathy.

 

No suspicious bony findings.

 

 

IMPRESSION:  Contrast enhanced CT abdomen and pelvis showing no acute or emergent finding.  Nose: Nose normal.      Mouth/Throat:      Lips: Pink.      Mouth: Mucous membranes are moist.   Eyes:      General: Lids are normal. Vision grossly intact. Gaze aligned appropriately.      Conjunctiva/sclera: Conjunctivae normal.      Pupils: Pupils are equal, round, and reactive to light.   Neck:      Trachea: Phonation normal.   Cardiovascular:      Rate and Rhythm: Normal rate and regular rhythm.      Heart sounds: Normal heart sounds.   Pulmonary:      Effort: Pulmonary effort is normal. No accessory muscle usage or respiratory distress.      Breath sounds: Normal breath sounds and air entry. No wheezing or rales.   Abdominal:      General: Abdomen is flat. Bowel sounds are normal. There is no distension.      Palpations: Abdomen is soft.      Tenderness: There is no abdominal tenderness.   Musculoskeletal:      Cervical back: Neck supple.      Right lower leg: No edema.      Left lower leg: No edema.        Feet:    Feet:      Right foot:      Toenail Condition: Right toenails are ingrown.      Left foot:      Toenail Condition: Left toenails are ingrown.   Skin:     General: Skin is warm and dry.      Findings: No rash.   Neurological:      General: No focal deficit present.      Mental Status: He is alert and oriented to person, place, and time. Mental status is at baseline.      Sensory: Sensation is intact.      Motor: Motor function is intact.      Coordination: Coordination is intact.      Gait: Gait is intact.   Psychiatric:         Attention and Perception: Attention and perception normal.         Mood and Affect: Mood and affect normal.         Speech: Speech normal.         Behavior: Behavior normal. Behavior is cooperative.         Thought Content: Thought content normal.         Cognition and Memory: Cognition and memory normal.         Judgment: Judgment normal.     Assessment and Plan:     1. Ingrown left greater toenail  Discussed home foot care and f/u with podiatry for further eval  -  Ambulatory referral/consult to Podiatry; Future  - clindamycin (CLEOCIN) 300 MG capsule; Take 1 capsule (300 mg total) by mouth every 8 (eight) hours.  Dispense: 30 capsule; Refill: 0           HARLEY Thompson, FNP-C  Family/Internal Medicine  [facility]

## 2023-03-17 ENCOUNTER — PATIENT MESSAGE (OUTPATIENT)
Dept: RESEARCH | Facility: HOSPITAL | Age: 63
End: 2023-03-17
Payer: COMMERCIAL

## 2023-05-17 ENCOUNTER — PATIENT MESSAGE (OUTPATIENT)
Dept: ENDOSCOPY | Facility: HOSPITAL | Age: 63
End: 2023-05-17

## 2023-07-12 RX ORDER — PANTOPRAZOLE SODIUM 40 MG/1
40 TABLET, DELAYED RELEASE ORAL DAILY
Qty: 30 TABLET | Refills: 11 | Status: SHIPPED | OUTPATIENT
Start: 2023-07-12

## 2023-07-12 NOTE — TELEPHONE ENCOUNTER
Refill Routing Note   Medication(s) are not appropriate for processing by [facility] for the following reason(s):      Patient not seen by provider within 15 months  Patient seen in ED/Hospital since LOV with provider    [facility] action(s):  Defer Care Due:  None identified              Appointments  past 12m or future 3m with PCP    Date Provider   Last Visit   11/15/2021 Socorro Payton MD   Next Visit   Visit date not found Socorro Payton MD   ED visits in past 90 days: 0        Note composed:11:27 AM 07/12/2023

## 2023-07-12 NOTE — TELEPHONE ENCOUNTER
No care due was identified.  [facility] Embedded Care Due Messages. Reference number: [id number].   7/11/2023 11:27:31 PM CDT

## 2024-08-19 NOTE — TELEPHONE ENCOUNTER
Care Due:                  Date            Visit Type   Department     Provider  --------------------------------------------------------------------------------    Last Visit: None Found      None         None Found  Next Visit: None Scheduled  None         None Found                                                            Last  Test          Frequency    Reason                     Performed    Due Date  --------------------------------------------------------------------------------    Office Visit  15 months..  pantoprazole.............  Not Found    Overdue    [employer] Embedded Care Due Messages. Reference number: [id number].   8/19/2024 6:52:54 PM CDT

## 2024-08-20 RX ORDER — PANTOPRAZOLE SODIUM 40 MG/1
40 TABLET, DELAYED RELEASE ORAL DAILY
Qty: 30 TABLET | Refills: 11 | OUTPATIENT
Start: 2024-08-20

## 2024-09-04 ENCOUNTER — OFFICE VISIT (OUTPATIENT)
Dept: FAMILY MEDICINE | Facility: CLINIC | Age: 64
End: 2024-09-04
Payer: COMMERCIAL

## 2024-09-04 VITALS
TEMPERATURE: 98 F | HEIGHT: 70 IN | BODY MASS INDEX: 29.95 KG/M2 | OXYGEN SATURATION: 97 % | SYSTOLIC BLOOD PRESSURE: 138 MMHG | DIASTOLIC BLOOD PRESSURE: 82 MMHG | HEART RATE: 67 BPM | WEIGHT: 209.19 LBS

## 2024-09-04 DIAGNOSIS — R42 VERTIGO: ICD-10-CM

## 2024-09-04 DIAGNOSIS — K21.9 GASTROESOPHAGEAL REFLUX DISEASE WITHOUT ESOPHAGITIS: ICD-10-CM

## 2024-09-04 DIAGNOSIS — H65.91 RIGHT OTITIS MEDIA WITH EFFUSION: ICD-10-CM

## 2024-09-04 DIAGNOSIS — G47.33 OSA (OBSTRUCTIVE SLEEP APNEA): ICD-10-CM

## 2024-09-04 DIAGNOSIS — Z12.5 ENCOUNTER FOR PROSTATE CANCER SCREENING: ICD-10-CM

## 2024-09-04 DIAGNOSIS — R03.0 ELEVATED BLOOD PRESSURE READING IN OFFICE WITHOUT DIAGNOSIS OF HYPERTENSION: ICD-10-CM

## 2024-09-04 DIAGNOSIS — M25.552 LEFT HIP PAIN: ICD-10-CM

## 2024-09-04 DIAGNOSIS — Z00.00 ANNUAL PHYSICAL EXAM: Primary | ICD-10-CM

## 2024-09-04 DIAGNOSIS — Z96.641 HISTORY OF RIGHT HIP REPLACEMENT: ICD-10-CM

## 2024-09-04 DIAGNOSIS — E66.09 CLASS 1 OBESITY DUE TO EXCESS CALORIES WITHOUT SERIOUS COMORBIDITY WITH BODY MASS INDEX (BMI) OF 30.0 TO 30.9 IN ADULT: ICD-10-CM

## 2024-09-04 DIAGNOSIS — Z91.89 AT RISK FOR CAREGIVER ROLE STRAIN: ICD-10-CM

## 2024-09-04 DIAGNOSIS — Z23 ENCOUNTER FOR VACCINATION: ICD-10-CM

## 2024-09-04 PROCEDURE — 3079F DIAST BP 80-89 MM HG: CPT | Mod: CPTII,S$GLB,, | Performed by: NURSE PRACTITIONER

## 2024-09-04 PROCEDURE — 99396 PREV VISIT EST AGE 40-64: CPT | Mod: 25,S$GLB,, | Performed by: NURSE PRACTITIONER

## 2024-09-04 PROCEDURE — 3008F BODY MASS INDEX DOCD: CPT | Mod: CPTII,S$GLB,, | Performed by: NURSE PRACTITIONER

## 2024-09-04 PROCEDURE — 1159F MED LIST DOCD IN RCRD: CPT | Mod: CPTII,S$GLB,, | Performed by: NURSE PRACTITIONER

## 2024-09-04 PROCEDURE — 99999 PR PBB SHADOW E&M-EST. PATIENT-LVL IV: CPT | Mod: PBBFAC,,, | Performed by: NURSE PRACTITIONER

## 2024-09-04 PROCEDURE — 90471 IMMUNIZATION ADMIN: CPT | Mod: S$GLB,,, | Performed by: NURSE PRACTITIONER

## 2024-09-04 PROCEDURE — 90715 TDAP VACCINE 7 YRS/> IM: CPT | Mod: S$GLB,,, | Performed by: NURSE PRACTITIONER

## 2024-09-04 PROCEDURE — 1160F RVW MEDS BY RX/DR IN RCRD: CPT | Mod: CPTII,S$GLB,, | Performed by: NURSE PRACTITIONER

## 2024-09-04 PROCEDURE — 3075F SYST BP GE 130 - 139MM HG: CPT | Mod: CPTII,S$GLB,, | Performed by: NURSE PRACTITIONER

## 2024-09-04 RX ORDER — PANTOPRAZOLE SODIUM 40 MG/1
40 TABLET, DELAYED RELEASE ORAL DAILY
Qty: 30 TABLET | Refills: 11 | Status: SHIPPED | OUTPATIENT
Start: 2024-09-04

## 2024-09-04 RX ORDER — LEVOCETIRIZINE DIHYDROCHLORIDE 5 MG/1
5 TABLET, FILM COATED ORAL NIGHTLY
Qty: 30 TABLET | Refills: 11 | Status: SHIPPED | OUTPATIENT
Start: 2024-09-04 | End: 2025-09-04

## 2024-09-04 RX ORDER — MECLIZINE HYDROCHLORIDE 25 MG/1
25 TABLET ORAL 3 TIMES DAILY PRN
Qty: 30 TABLET | Refills: 1 | Status: SHIPPED | OUTPATIENT
Start: 2024-09-04

## 2024-09-04 RX ORDER — FLUTICASONE PROPIONATE 50 MCG
1 SPRAY, SUSPENSION (ML) NASAL DAILY
Qty: 16 EACH | Refills: 0 | Status: SHIPPED | OUTPATIENT
Start: 2024-09-04

## 2024-09-04 NOTE — PROGRESS NOTES
SUBJECTIVE     Chief Complaint   Patient presents with    Annual Exam     HPI  Pierre Grossman is a 64 y.o. male with multiple medical diagnoses as listed in the medical history and problem list that presents for annual exam. Pt has been doing well since his last visit. He has a good appetite and eats generally unhealthy diet. He does not exercise, active at work. He sleeps for ~7 hours nightly. Patient has DOMINIK. Does not wear CPAP. Pt does not take OTC supplements. He does have any current stressors, taking care of elderly aunt. Pt is UTD on age appropriate CA screening. Dental exam is not UTD. Eye exam is not UTD.     Ear fullness and intermittent periods of dizziness relieved by meclizine; however causes drowsiness.     ROS as listed.   PAST MEDICAL HISTORY:  Past Medical History:   Diagnosis Date    Head injury 1986    Nephrolithiasis        PAST SURGICAL HISTORY:  Past Surgical History:   Procedure Laterality Date    CERVICAL FUSION      COLONOSCOPY N/A 5/30/2018    Procedure: COLONOSCOPY;  Surgeon: Zakia Mirza MD;  Location: [facility];  Service: Endoscopy;  Laterality: N/A;    EYE SURGERY      cataract    HERNIA REPAIR      TOTAL REPLACEMENT OF HIP JOINT USING COMPUTER-ASSISTED NAVIGATION Right 10/3/2018    Procedure: REPLACEMENT-HIP-TOTAL WITH NAVIGATION;  Surgeon: [facility] Jr., MD;  Location: [address];  Service: Orthopedics;  Laterality: Right;       SOCIAL HISTORY:  Social History     Socioeconomic History    Marital status:    Tobacco Use    Smoking status: Never    Smokeless tobacco: Never   Substance and Sexual Activity    Alcohol use: No    Drug use: No    Sexual activity: Yes     Partners: Female       FAMILY HISTORY:  Family History   Problem Relation Name Age of Onset    Aneurysm Mother          brain    Pancreatitis Father      Heart attack Neg Hx      Hypertension Neg Hx      Colon cancer Neg Hx      Esophageal cancer Neg Hx         ALLERGIES AND MEDICATIONS: updated and  "reviewed.  Review of patient's allergies indicates:   Allergen Reactions    Iodinated contrast media Shortness Of Breath and Swelling    Iodine Itching     Current Outpatient Medications   Medication Sig Dispense Refill    aspirin (ECOTRIN) 81 MG EC tablet Take 1 tablet (81 mg total) by mouth 2 (two) times daily. 60 tablet 0    VITAMIN B COMPLEX ORAL Take by mouth.      ascorbic acid (VITAMIN C ORAL) Take by mouth.      clindamycin (CLEOCIN) 300 MG capsule Take 1 capsule (300 mg total) by mouth every 8 (eight) hours. 30 capsule 0    fluticasone propionate (FLONASE) 50 mcg/actuation nasal spray 1 spray (50 mcg total) by Each Nostril route once daily. 16 each 0    ibuprofen (ADVIL,MOTRIN) 200 MG tablet Take 200 mg by mouth every 6 (six) hours as needed for Pain. (Patient not taking: Reported on 9/4/2024)      levocetirizine (XYZAL) 5 MG tablet Take 1 tablet (5 mg total) by mouth every evening. 30 tablet 11    linaCLOtide (LINZESS) 72 mcg Cap capsule Take 1 capsule (72 mcg total) by mouth before breakfast. 30 capsule 1    meclizine (ANTIVERT) 25 mg tablet Take 1 tablet (25 mg total) by mouth 3 (three) times daily as needed for Dizziness. 30 tablet 1    methocarbamoL (ROBAXIN) 750 MG Tab Take 1 tablet (750 mg total) by mouth every 6 to 8 hours as needed (MUSCLE SPASM). 60 tablet 0    ondansetron (ZOFRAN-ODT) 8 MG TbDL Take 1 tablet (8 mg total) by mouth every 8 (eight) hours as needed (NAUSEA). 30 tablet 0    oxyCODONE-acetaminophen (PERCOCET) 7.5-325 mg per tablet Take 1 tablet by mouth every 4 (four) hours as needed for Pain. 30 tablet 0    pantoprazole (PROTONIX) 40 MG tablet Take 1 tablet (40 mg total) by mouth once daily. 30 tablet 11     No current facility-administered medications for this visit.       ROS  Review of Systems      OBJECTIVE     Physical Exam  Vitals:    09/04/24 1022   BP: 138/82   Pulse:    Temp:     Body mass index is 30.02 kg/m².  Weight: 94.9 kg (209 lb 3.5 oz)   Height: 5' 10" (177.8 cm) "     Physical Exam  Vitals and nursing note reviewed.   Constitutional:       General: He is not in acute distress.     Appearance: Normal appearance.   HENT:      Head: Normocephalic and atraumatic.      Right Ear: Tympanic membrane normal.      Left Ear: No tenderness. A middle ear effusion is present.      Nose: Nose normal.      Mouth/Throat:      Pharynx: No posterior oropharyngeal erythema.   Eyes:      Conjunctiva/sclera: Conjunctivae normal.      Pupils: Pupils are equal, round, and reactive to light.   Cardiovascular:      Rate and Rhythm: Normal rate and regular rhythm.      Heart sounds: Normal heart sounds. No murmur heard.  Pulmonary:      Effort: Pulmonary effort is normal. No respiratory distress.      Breath sounds: Normal breath sounds.   Abdominal:      General: Bowel sounds are normal.      Palpations: Abdomen is soft.      Tenderness: There is no abdominal tenderness.   Musculoskeletal:      Cervical back: Normal range of motion.      Right lower leg: No edema.      Left lower leg: No edema.   Skin:     General: Skin is warm and dry.      Findings: No rash.   Neurological:      Mental Status: He is alert and oriented to person, place, and time.   Psychiatric:         Mood and Affect: Mood normal.         Behavior: Behavior normal.           Health Maintenance         Date Due Completion Date    HIV Screening Never done ---    Shingles Vaccine (1 of 2) Never done ---    RSV Vaccine (Age 60+ and Pregnant patients) (1 - 1-dose 60+ series) Never done ---    PROSTATE-SPECIFIC ANTIGEN 01/20/2021 1/20/2020    Hemoglobin A1c (Diabetic Prevention Screening) 01/20/2023 1/20/2020    Influenza Vaccine (1) Never done ---    COVID-19 Vaccine (5 - 2023-24 season) 09/01/2024 4/22/2021    Lipid Panel 09/04/2025 9/4/2024    Colorectal Cancer Screening 05/30/2028 5/30/2018    TETANUS VACCINE 09/04/2034 9/4/2024              ASSESSMENT     64 y.o. male with     1. Annual physical exam    2. History of right hip  replacement    3. Left hip pain    4. DOMINIK (obstructive sleep apnea)    5. At risk for caregiver role strain    6. Encounter for prostate cancer screening    7. Gastroesophageal reflux disease without esophagitis    8. Vertigo    9. Encounter for vaccination    10. Elevated blood pressure reading in office without diagnosis of hypertension    11. Class 1 obesity due to excess calories without serious comorbidity with body mass index (BMI) of 30.0 to 30.9 in adult    12. Right otitis media with effusion        PLAN:     1. Annual physical exam  Counseled patient on importance of health prevention screening, immunizations, and overall wellness.   Tdap today.   Will return for Influenza vaccine.  - CBC Auto Differential; Future  - Comprehensive Metabolic Panel; Future  - Lipid Panel; Future  - Hemoglobin A1C; Future  - PSA, Screening; Future  - TESTOSTERONE PANEL; Future  - TSH; Future  - Uric Acid; Future  - HIV 1/2 Ag/Ab (4th Gen); Future    2. History of right hip replacement  Stable.    3. Left hip pain  Consider right hip xray and orthopedics referral.     4. DOMINIK (obstructive sleep apnea)  Recommend CPAP nightly.     5. At risk for caregiver role strain  Provided emotional support.     6. Encounter for prostate cancer screening  PSA screening.     7. Gastroesophageal reflux disease without esophagitis  - pantoprazole (PROTONIX) 40 MG tablet; Take 1 tablet (40 mg total) by mouth once daily.  Dispense: 30 tablet; Refill: 11    8. Vertigo  - meclizine (ANTIVERT) 25 mg tablet; Take 1 tablet (25 mg total) by mouth 3 (three) times daily as needed for Dizziness.  Dispense: 30 tablet; Refill: 1    9. Encounter for vaccination  - DIPH,PERTUSS(ACEL),TET VAC(PF)(ADULT)(ADACEL)(TDaP)    10. Elevated blood pressure reading in office without diagnosis of hypertension  Monitor BP readings at home and document on log.   Return to clinic in 2 weeks nurse visit.   Low sodium diet.   Daily exercise as tolerated.     11. Class 1  obesity due to excess calories without serious comorbidity with body mass index (BMI) of 30.0 to 30.9 in adult  Low calorie, Mediterranean diet.   Daily exercise as tolerated.     12. Right otitis media with effusion  - levocetirizine (XYZAL) 5 MG tablet; Take 1 tablet (5 mg total) by mouth every evening.  Dispense: 30 tablet; Refill: 11  - fluticasone propionate (FLONASE) 50 mcg/actuation nasal spray; 1 spray (50 mcg total) by Each Nostril route once daily.  Dispense: 16 each; Refill: 0        Jennifer Ledezma, GLORIA, APRN, FNP-C  Family Medicine  [facility]  09/04/2024 9:47 AM        Follow up in about 2 weeks (around 9/18/2024) for nurse visit for BP check.

## 2024-09-18 ENCOUNTER — TELEPHONE (OUTPATIENT)
Dept: FAMILY MEDICINE | Facility: CLINIC | Age: 64
End: 2024-09-18

## 2024-09-18 ENCOUNTER — CLINICAL SUPPORT (OUTPATIENT)
Dept: FAMILY MEDICINE | Facility: CLINIC | Age: 64
End: 2024-09-18
Payer: COMMERCIAL

## 2024-09-18 VITALS — DIASTOLIC BLOOD PRESSURE: 102 MMHG | HEART RATE: 54 BPM | SYSTOLIC BLOOD PRESSURE: 180 MMHG | OXYGEN SATURATION: 97 %

## 2024-09-18 DIAGNOSIS — M16.11 PRIMARY OSTEOARTHRITIS OF RIGHT HIP: ICD-10-CM

## 2024-09-18 DIAGNOSIS — I10 ESSENTIAL HYPERTENSION: ICD-10-CM

## 2024-09-18 DIAGNOSIS — R03.0 ELEVATED BLOOD PRESSURE READING IN OFFICE WITHOUT DIAGNOSIS OF HYPERTENSION: Primary | ICD-10-CM

## 2024-09-18 PROCEDURE — 99999 PR PBB SHADOW E&M-EST. PATIENT-LVL II: CPT | Mod: PBBFAC,,,

## 2024-09-18 RX ORDER — AMLODIPINE BESYLATE 5 MG/1
5 TABLET ORAL DAILY
Qty: 30 TABLET | Refills: 0 | Status: SHIPPED | OUTPATIENT
Start: 2024-09-18 | End: 2025-09-18

## 2024-09-18 NOTE — PROGRESS NOTES
Pierre Grossman 64 y.o. male is here today for Blood Pressure check.   History of HTN no.    Review of patient's allergies indicates:   Allergen Reactions    Iodinated contrast media Shortness Of Breath and Swelling    Iodine Itching     Creatinine   Date Value Ref Range Status   09/04/2024 1.1 0.5 - 1.4 mg/dL Final     Sodium   Date Value Ref Range Status   09/04/2024 139 136 - 145 mmol/L Final     Potassium   Date Value Ref Range Status   09/04/2024 4.3 3.5 - 5.1 mmol/L Final   ]  Patient verifies taking blood pressure medications on a regular basis at the same time of the day.     Current Outpatient Medications:     ascorbic acid (VITAMIN C ORAL), Take by mouth., Disp: , Rfl:     aspirin (ECOTRIN) 81 MG EC tablet, Take 1 tablet (81 mg total) by mouth 2 (two) times daily., Disp: 60 tablet, Rfl: 0    fluticasone propionate (FLONASE) 50 mcg/actuation nasal spray, 1 spray (50 mcg total) by Each Nostril route once daily., Disp: 16 each, Rfl: 0    ibuprofen (ADVIL,MOTRIN) 200 MG tablet, Take 200 mg by mouth every 6 (six) hours as needed for Pain. (Patient not taking: Reported on 9/4/2024), Disp: , Rfl:     levocetirizine (XYZAL) 5 MG tablet, Take 1 tablet (5 mg total) by mouth every evening., Disp: 30 tablet, Rfl: 11    meclizine (ANTIVERT) 25 mg tablet, Take 1 tablet (25 mg total) by mouth 3 (three) times daily as needed for Dizziness., Disp: 30 tablet, Rfl: 1    pantoprazole (PROTONIX) 40 MG tablet, Take 1 tablet (40 mg total) by mouth once daily., Disp: 30 tablet, Rfl: 11    VITAMIN B COMPLEX ORAL, Take by mouth., Disp: , Rfl:   Does patient have record of home blood pressure readings yes. Readings have been averaging 160's over 90's.     Patient is asymptomatic.     /102, pulse 60.    Blood pressure reading after 15 minutes was 180/102, Pulse 54.    Jennifer Ledezma, DNP notified.

## 2024-09-18 NOTE — TELEPHONE ENCOUNTER
Spoke w/ PT to notify of new Rx for Norvasc 5mg to start today. Continue to take blood pressure readings at home. Nurse visit for blood pressure check on Friday 9/27/24.  Notified of ortho referral for LT hip pain.  PT verbalized understanding.

## 2024-09-18 NOTE — PROGRESS NOTES
Will start amlodipine 5 mg po daily. Return to clinic in 1 week for BP check. He needs to continue checking BP at home.     Referral placed for orthopedics for hip pain.     Jennifer Ledezma, GLORIA, APRN, FNP-C

## 2024-09-18 NOTE — PROGRESS NOTES
While with patient for blood pressure check, he reported that he is in severe pain from his LT hip. He is not able to sleep at night. He does not want pain medication.   His pain begins in his hip and radiates to his ankle/shin area. His wife gave him her Indomethacin 50mg which gave him some relief.  He has a previous RT hip replacement and is aware that LT hip is not healthy per ortho provider.   Please advise.

## 2024-09-19 DIAGNOSIS — M16.11 PRIMARY OSTEOARTHRITIS OF RIGHT HIP: Primary | ICD-10-CM

## 2024-09-19 DIAGNOSIS — Z96.641 STATUS POST TOTAL HIP REPLACEMENT, RIGHT: ICD-10-CM

## 2024-09-23 ENCOUNTER — OFFICE VISIT (OUTPATIENT)
Dept: ORTHOPEDICS | Facility: CLINIC | Age: 64
End: 2024-09-23
Payer: COMMERCIAL

## 2024-09-23 VITALS — BODY MASS INDEX: 29.95 KG/M2 | WEIGHT: 209.19 LBS | HEIGHT: 70 IN

## 2024-09-23 DIAGNOSIS — M16.11 PRIMARY OSTEOARTHRITIS OF RIGHT HIP: ICD-10-CM

## 2024-09-23 DIAGNOSIS — M16.12 PRIMARY OSTEOARTHRITIS OF LEFT HIP: Primary | ICD-10-CM

## 2024-09-23 PROCEDURE — 3008F BODY MASS INDEX DOCD: CPT | Mod: CPTII,S$GLB,, | Performed by: ORTHOPAEDIC SURGERY

## 2024-09-23 PROCEDURE — 3044F HG A1C LEVEL LT 7.0%: CPT | Mod: CPTII,S$GLB,, | Performed by: ORTHOPAEDIC SURGERY

## 2024-09-23 PROCEDURE — 1159F MED LIST DOCD IN RCRD: CPT | Mod: CPTII,S$GLB,, | Performed by: ORTHOPAEDIC SURGERY

## 2024-09-23 PROCEDURE — 99204 OFFICE O/P NEW MOD 45 MIN: CPT | Mod: S$GLB,,, | Performed by: ORTHOPAEDIC SURGERY

## 2024-09-23 PROCEDURE — 99999 PR PBB SHADOW E&M-EST. PATIENT-LVL IV: CPT | Mod: PBBFAC,,, | Performed by: ORTHOPAEDIC SURGERY

## 2024-09-23 RX ORDER — MELOXICAM 15 MG/1
15 TABLET ORAL DAILY
Qty: 30 TABLET | Refills: 1 | Status: SHIPPED | OUTPATIENT
Start: 2024-09-23

## 2024-09-23 NOTE — PROGRESS NOTES
NEW PATIENT ORTHOPAEDIC: HIP    PRIMARY CARE PHYSICIAN: Socorro Payton MD   REFERRING PROVIDER: Jennifer Ledezma, [facility]  [address] [facility] [place] [address]     ASSESSMENT & PLAN:    Impression:  Left Hip Severe Degenerative Osteoarthritis, Primary  Right hip s/p MELISSA by Dr. [facility] 2018    Follow Up Plan: November for pre surgical planning    Surgery:     Pierre Grossman has radiograph and physical exam evidence of the aforementioned impression and wishes to pursue surgery. This patient appears to have sufficient symptoms to warrant surgical intervention and is an appropriate candidate for left Primary Total Hip Arthroplasty as evidenced by months of unsuccessful non-operative treatment as outlined in the HPI below and progressive symptoms.    We had a lengthy discussion regarding the risk and benefit of surgery, the alternatives, limitations and personnel involved. These included but were not limited to infection, persistent pain, instability, nerve injury, blood clots, dislocation, loosening, leg length inequality and medical complications. We also discussed the pre-operative course, surgery itself and rehabilitation.    Janet-operative blood management and transfusion issues were discussed, and options clearly outlined. The patient has consented to the use of the banked allogenic blood if medically necessary.    The patient has elected to schedule surgery at this time or intends to call the office with a surgical date. Shared decision making occurred while obtaining informed consent. The patient will be scheduled for a pre-operative education class at which time they will have their nasal swab completed and will be given CHG cloths along with the verbal and written instructions for their use.    We will plan for use of [employer] Accolade 2/Trident 2 components, with LEMUEL assistance.     Tentative Surgery Date: 12/4/24    Patient comes in with greater than 1 month history of having atraumatic onset  left hip pain.  This is groin pain.  He also has gluteal pain with some extension into his knee.  These are similar pains that he had on his right side prior to hip replacement in 2018.  He did really well after that hip surgery.  It alleviated his pains.  No tiffany operational issues.  He is interested in having a left hip replaced.  His pain has been refractory to anti-inflammatory medications.  He is having functional ADLs difficulty getting out of his truck.    The patient has been ordered:  CT (LEMUEL Protocol)  Pain Prescription    CONSULTS:   Anesthesia for pre-surgical optimization    ACTIVE PROBLEM LIST  Patient Active Problem List   Diagnosis    DOMINIK (obstructive sleep apnea)    Primary osteoarthritis of right hip    Status post total hip replacement, right 10/3/2018    Nephrolithiasis           SUBJECTIVE    CHIEF COMPLAINT: Hip Pain    HPI:   Pierre Grossman is a 64 y.o. male here for evaluation and management of left hip pain. There is not a specific incident that brought about this pain. he has had progressive problems with the hip(s) starting 1 months ago and is progressing which is now interfering with activities which include: walking, functional household ADL's, participating in family activities, enjoying hobbies, rising from a sitting position, standing for prolonged periods of time, getting in and out of a car, and climbing stairs     Currently the pain in the joint is moderate with activity.  The pain is constant and is located in buttocks, lateral hip, groin, and thigh.  The pain is described as aching, severe, and sharp. Relieving factors include rest, ice or heat, and over the counter medication . No associated Catching, Clicking, and Popping.     They do not report leg length inequality.     Pierre Grossman has no additional complaints.     PROGRESSIVE SYMPTOMS:  Pain impacting work  Pain worsened by weight bearing  Pain effecting living situation    FUNCTIONAL STATUS:   Participate in  recreational activities     PREVIOUS TREATMENTS:  Medical: OTC NSAIDS  Physical Therapy: Activities Modified  and Home Exercise Program  Previous Orthopaedic Surgery: Right MELISSA    REVIEW OF SYSTEMS:  PAIN ASSESSMENT:  See HPI.  MUSCULOSKELETAL: See HPI.  OTHER 10 point review of systems is negative except as stated in HPI above    PAST MEDICAL HISTORY   has a past medical history of Head injury (1986) and Nephrolithiasis.     PAST SURGICAL HISTORY   has a past surgical history that includes Hernia repair; Eye surgery; Cervical fusion; Colonoscopy (N/A, 5/30/2018); and Total replacement of hip joint using computer-assisted navigation (Right, 10/3/2018).     FAMILY HISTORY  family history includes Aneurysm in his mother; Pancreatitis in his father.     SOCIAL HISTORY   reports that he has never smoked. He has never used smokeless tobacco. He reports that he does not drink alcohol and does not use drugs.     ALLERGIES   Review of patient's allergies indicates:   Allergen Reactions    Iodinated contrast media Shortness Of Breath and Swelling    Iodine Itching        MEDICATIONS   Current Outpatient Medications on File Prior to Visit   Medication Sig Dispense Refill    amLODIPine (NORVASC) 5 MG tablet Take 1 tablet (5 mg total) by mouth once daily. 30 tablet 0    ascorbic acid (VITAMIN C ORAL) Take by mouth.      aspirin (ECOTRIN) 81 MG EC tablet Take 1 tablet (81 mg total) by mouth 2 (two) times daily. 60 tablet 0    fluticasone propionate (FLONASE) 50 mcg/actuation nasal spray 1 spray (50 mcg total) by Each Nostril route once daily. 16 each 0    ibuprofen (ADVIL,MOTRIN) 200 MG tablet Take 200 mg by mouth every 6 (six) hours as needed for Pain. (Patient not taking: Reported on 9/4/2024)      levocetirizine (XYZAL) 5 MG tablet Take 1 tablet (5 mg total) by mouth every evening. 30 tablet 11    meclizine (ANTIVERT) 25 mg tablet Take 1 tablet (25 mg total) by mouth 3 (three) times daily as needed for Dizziness. 30 tablet 1     pantoprazole (PROTONIX) 40 MG tablet Take 1 tablet (40 mg total) by mouth once daily. 30 tablet 11    VITAMIN B COMPLEX ORAL Take by mouth.       No current facility-administered medications on file prior to visit.          PHYSICAL EXAM   vitals were not taken for this visit.   There is no height or weight on file to calculate BMI.      All other systems deferred.  GENERAL:  No acute distress  HABITUS: Normal  GAIT: Antalgic  SKIN: Normal  and No erythema, warmth, fluctuance     HIP EXAM:    left:   ROM:   Flexion: 120 degrees    Internal Rotation: 30 degrees    External Rotation: 30 degrees    Abduction: 45 degrees    Adduction: 20 degrees  No apparent leg length discrepancy    Palpation: No tenderness over greater trochanter, SI joint, ilioposas, IT band, gluteal musculature   Pain is reproduced with IR or ER of the hip  Strength: 5/5 hip flexion, abduction, knee flexion and extension   Straight leg raise: Negative   Neurovascular Status: Sensation intact to light touch in Sural, saphenous, SPN, DPN, Tibial nerve distribution  2+ pulse DP/PT, normal capillary refill, foot has normal warmth    DATA:  Diagnostic tests reviewed for today's visit:     AP pelvis, hip, and lateral radiographs shows severe narrowing of the superior joint space with sclerosis and osteophyte formation. 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 The femoral neck is in netural position. The femoral head is spherical at superior margin. There is no signficant evidence of acetabular dysplasia and the femoral head remains covered.  Right hip hardware appears well-positioned without evidence of loosening or hardware compromise.

## 2024-09-26 DIAGNOSIS — H65.91 RIGHT OTITIS MEDIA WITH EFFUSION: ICD-10-CM

## 2024-09-26 RX ORDER — FLUTICASONE PROPIONATE 50 MCG
SPRAY, SUSPENSION (ML) NASAL
Qty: 48 ML | Refills: 1 | OUTPATIENT
Start: 2024-09-26

## 2024-09-26 NOTE — TELEPHONE ENCOUNTER
Spoke w/ PT. He does not need a 90 day supply and did not request this. He buys the Flonase OTC.    Also spoke w/ pharmacy. They were unaware of 90 day request.

## 2024-09-27 ENCOUNTER — CLINICAL SUPPORT (OUTPATIENT)
Dept: FAMILY MEDICINE | Facility: CLINIC | Age: 64
End: 2024-09-27
Payer: COMMERCIAL

## 2024-09-27 VITALS — HEART RATE: 66 BPM | SYSTOLIC BLOOD PRESSURE: 148 MMHG | OXYGEN SATURATION: 96 % | DIASTOLIC BLOOD PRESSURE: 82 MMHG

## 2024-09-27 DIAGNOSIS — Z01.30 BP CHECK: Primary | ICD-10-CM

## 2024-09-27 DIAGNOSIS — I10 ESSENTIAL HYPERTENSION: Primary | ICD-10-CM

## 2024-09-27 PROCEDURE — 99999 PR PBB SHADOW E&M-EST. PATIENT-LVL II: CPT | Mod: PBBFAC,,,

## 2024-09-27 RX ORDER — AMLODIPINE BESYLATE 10 MG/1
10 TABLET ORAL DAILY
Qty: 90 TABLET | Refills: 0 | Status: SHIPPED | OUTPATIENT
Start: 2024-09-27 | End: 2025-09-27

## 2024-09-27 NOTE — PROGRESS NOTES
Pierre Grossman 64 y.o. male is here today for Blood Pressure check.   History of HTN no.    Review of patient's allergies indicates:   Allergen Reactions    Iodinated contrast media Shortness Of Breath and Swelling    Iodine Itching     Creatinine   Date Value Ref Range Status   09/04/2024 1.1 0.5 - 1.4 mg/dL Final     Sodium   Date Value Ref Range Status   09/04/2024 139 136 - 145 mmol/L Final     Potassium   Date Value Ref Range Status   09/04/2024 4.3 3.5 - 5.1 mmol/L Final   ]  Patient verifies taking blood pressure medications on a regular basis at the same time of the day.     Current Outpatient Medications:     amLODIPine (NORVASC) 5 MG tablet, Take 1 tablet (5 mg total) by mouth once daily., Disp: 30 tablet, Rfl: 0    ascorbic acid (VITAMIN C ORAL), Take by mouth., Disp: , Rfl:     aspirin (ECOTRIN) 81 MG EC tablet, Take 1 tablet (81 mg total) by mouth 2 (two) times daily., Disp: 60 tablet, Rfl: 0    fluticasone propionate (FLONASE) 50 mcg/actuation nasal spray, 1 spray (50 mcg total) by Each Nostril route once daily., Disp: 16 each, Rfl: 0    ibuprofen (ADVIL,MOTRIN) 200 MG tablet, Take 200 mg by mouth every 6 (six) hours as needed for Pain. (Patient not taking: Reported on 9/4/2024), Disp: , Rfl:     levocetirizine (XYZAL) 5 MG tablet, Take 1 tablet (5 mg total) by mouth every evening., Disp: 30 tablet, Rfl: 11    meclizine (ANTIVERT) 25 mg tablet, Take 1 tablet (25 mg total) by mouth 3 (three) times daily as needed for Dizziness., Disp: 30 tablet, Rfl: 1    meloxicam (MOBIC) 15 MG tablet, Take 1 tablet (15 mg total) by mouth once daily., Disp: 30 tablet, Rfl: 1    pantoprazole (PROTONIX) 40 MG tablet, Take 1 tablet (40 mg total) by mouth once daily., Disp: 30 tablet, Rfl: 11    VITAMIN B COMPLEX ORAL, Take by mouth., Disp: , Rfl:   Does patient have record of home blood pressure readings yes. Readings have been averaging 150-160's/90's.   Last dose of blood pressure medication was taken at 0730.  Patient  is asymptomatic.       BP: (!) 152/84 , Pulse: 66 .    Blood pressure reading after 15 minutes was 148/82, Pulse 66.  Jennifer Ledezma NP notified.       normal...

## 2024-10-08 NOTE — ED TRIAGE NOTES
"61 yo male presents to ED with c/o rib pain to left side. 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 Pt states that it was raining when he stepped down from the machine and he slipped and fell, injuring his rib cage to the left side of his body. Pt reports that he visited a primary care physician today to address the injury and on his way home had sneezed and could not take the pain to the area. Pt rates the pain at a 10 on a PRS OF 0-10.  " ambulatory

## 2024-10-11 ENCOUNTER — CLINICAL SUPPORT (OUTPATIENT)
Dept: FAMILY MEDICINE | Facility: CLINIC | Age: 64
End: 2024-10-11
Payer: COMMERCIAL

## 2024-10-11 VITALS — SYSTOLIC BLOOD PRESSURE: 132 MMHG | DIASTOLIC BLOOD PRESSURE: 72 MMHG | HEART RATE: 77 BPM

## 2024-10-11 DIAGNOSIS — Z01.30 BP CHECK: Primary | ICD-10-CM

## 2024-10-11 PROCEDURE — 99999 PR PBB SHADOW E&M-EST. PATIENT-LVL I: CPT | Mod: PBBFAC,,,

## 2024-10-11 NOTE — PROGRESS NOTES
Pierre Grossman 64 y.o. male is here today for Blood Pressure check.   History of HTN yes.    Review of patient's allergies indicates:   Allergen Reactions    Iodinated contrast media Shortness Of Breath and Swelling    Iodine Itching     Creatinine   Date Value Ref Range Status   09/04/2024 1.1 0.5 - 1.4 mg/dL Final     Sodium   Date Value Ref Range Status   09/04/2024 139 136 - 145 mmol/L Final     Potassium   Date Value Ref Range Status   09/04/2024 4.3 3.5 - 5.1 mmol/L Final   ]  Patient verifies taking blood pressure medications on a regular basis at the same time of the day.     Current Outpatient Medications:     amLODIPine (NORVASC) 10 MG tablet, Take 1 tablet (10 mg total) by mouth once daily., Disp: 90 tablet, Rfl: 0    ascorbic acid (VITAMIN C ORAL), Take by mouth., Disp: , Rfl:     aspirin (ECOTRIN) 81 MG EC tablet, Take 1 tablet (81 mg total) by mouth 2 (two) times daily., Disp: 60 tablet, Rfl: 0    fluticasone propionate (FLONASE) 50 mcg/actuation nasal spray, 1 spray (50 mcg total) by Each Nostril route once daily., Disp: 16 each, Rfl: 0    ibuprofen (ADVIL,MOTRIN) 200 MG tablet, Take 200 mg by mouth every 6 (six) hours as needed for Pain. (Patient not taking: Reported on 9/4/2024), Disp: , Rfl:     levocetirizine (XYZAL) 5 MG tablet, Take 1 tablet (5 mg total) by mouth every evening., Disp: 30 tablet, Rfl: 11    meclizine (ANTIVERT) 25 mg tablet, Take 1 tablet (25 mg total) by mouth 3 (three) times daily as needed for Dizziness., Disp: 30 tablet, Rfl: 1    meloxicam (MOBIC) 15 MG tablet, Take 1 tablet (15 mg total) by mouth once daily., Disp: 30 tablet, Rfl: 1    pantoprazole (PROTONIX) 40 MG tablet, Take 1 tablet (40 mg total) by mouth once daily., Disp: 30 tablet, Rfl: 11    VITAMIN B COMPLEX ORAL, Take by mouth., Disp: , Rfl:   Does patient have record of home blood pressure readings no. Readings have been averaging 120's/70's.   Last dose of blood pressure medication was taken at 0800 and  2100.  Patient is asymptomatic.     Complains of hip pain and is taking meloxicam without relief unable to sleep at night with pain. Requesting another pain medication options.    BP: 132/72 , Pulse: 77 .    Dr. Payton notified.

## 2024-11-01 DIAGNOSIS — M16.12 PRIMARY OSTEOARTHRITIS OF LEFT HIP: Primary | ICD-10-CM

## 2024-11-04 ENCOUNTER — OFFICE VISIT (OUTPATIENT)
Dept: ORTHOPEDICS | Facility: CLINIC | Age: 64
End: 2024-11-04
Payer: COMMERCIAL

## 2024-11-04 VITALS — WEIGHT: 209.19 LBS | HEIGHT: 70 IN | BODY MASS INDEX: 29.95 KG/M2

## 2024-11-04 DIAGNOSIS — M16.12 PRIMARY OSTEOARTHRITIS OF LEFT HIP: Primary | ICD-10-CM

## 2024-11-04 PROCEDURE — 99213 OFFICE O/P EST LOW 20 MIN: CPT | Mod: S$GLB,,, | Performed by: ORTHOPAEDIC SURGERY

## 2024-11-04 PROCEDURE — 1159F MED LIST DOCD IN RCRD: CPT | Mod: CPTII,S$GLB,, | Performed by: ORTHOPAEDIC SURGERY

## 2024-11-04 PROCEDURE — 3008F BODY MASS INDEX DOCD: CPT | Mod: CPTII,S$GLB,, | Performed by: ORTHOPAEDIC SURGERY

## 2024-11-04 PROCEDURE — 99999 PR PBB SHADOW E&M-EST. PATIENT-LVL III: CPT | Mod: PBBFAC,,, | Performed by: ORTHOPAEDIC SURGERY

## 2024-11-04 PROCEDURE — 3044F HG A1C LEVEL LT 7.0%: CPT | Mod: CPTII,S$GLB,, | Performed by: ORTHOPAEDIC SURGERY

## 2024-11-04 NOTE — PROGRESS NOTES
EST PATIENT ORTHOPAEDIC: HIP    PRIMARY CARE PHYSICIAN: Socorro Payton MD   REFERRING PROVIDER: No referring provider defined for this encounter.     ASSESSMENT & PLAN:    Impression:  Left Hip Severe Degenerative Osteoarthritis, Primary  Right hip s/p MELISSA by Dr. [facility] 2018    Follow Up Plan: 3 weeks after surgery    Surgery:     Pierre Grossman has radiograph and physical exam evidence of the aforementioned impression and wishes to pursue surgery. This patient appears to have sufficient symptoms to warrant surgical intervention and is an appropriate candidate for left Primary Total Hip Arthroplasty as evidenced by months of unsuccessful non-operative treatment as outlined in the HPI below and progressive symptoms.    We had a lengthy discussion regarding the risk and benefit of surgery, the alternatives, limitations and personnel involved. These included but were not limited to infection, persistent pain, instability, nerve injury, blood clots, dislocation, loosening, leg length inequality and medical complications. We also discussed the pre-operative course, surgery itself and rehabilitation.    Janet-operative blood management and transfusion issues were discussed, and options clearly outlined. The patient has consented to the use of the banked allogenic blood if medically necessary.    The patient has elected to schedule surgery at this time or intends to call the office with a surgical date. Shared decision making occurred while obtaining informed consent. The patient will be scheduled for a pre-operative education class at which time they will have their nasal swab completed and will be given CHG cloths along with the verbal and written instructions for their use.    We will plan for use of [place] Accolade 2/Trident 2 components, with LEMUEL assistance.     Tentative Surgery Date: 12/4/24. Attempt same day    Patient comes in with greater than 1 month history of having atraumatic onset left hip pain.  This  is groin pain.  He also has gluteal pain with some extension into his knee.  These are similar pains that he had on his right side prior to hip replacement in 2018.  He did really well after that hip surgery.  It alleviated his pains.  No tiffany operational issues.  He is interested in having a left hip replaced.  His pain has been refractory to anti-inflammatory medications.  He is having functional ADLs difficulty getting out of his truck.    The patient has been ordered:  CT (LEMUEL Protocol)  Pain Prescription    CONSULTS:   Anesthesia for pre-surgical optimization    ACTIVE PROBLEM LIST  Patient Active Problem List   Diagnosis    DOMINIK (obstructive sleep apnea)    Primary osteoarthritis of right hip    Status post total hip replacement, right 10/3/2018    Nephrolithiasis           SUBJECTIVE    CHIEF COMPLAINT: Hip Pain    HPI:   Pierre Grossman is a 64 y.o. male here for evaluation and management of left hip pain. There is not a specific incident that brought about this pain. he has had progressive problems with the hip(s) starting 1 months ago and is progressing which is now interfering with activities which include: walking, functional household ADL's, participating in family activities, enjoying hobbies, rising from a sitting position, standing for prolonged periods of time, getting in and out of a car, and climbing stairs     Currently the pain in the joint is moderate with activity.  The pain is constant and is located in buttocks, lateral hip, groin, and thigh.  The pain is described as aching, severe, and sharp. Relieving factors include rest, ice or heat, and over the counter medication . No associated Catching, Clicking, and Popping.     They do not report leg length inequality.     Pierre Grossman has no additional complaints.     11/4/24: Here for pre surgical planning.     PROGRESSIVE SYMPTOMS:  Pain impacting work  Pain worsened by weight bearing  Pain effecting living situation    FUNCTIONAL STATUS:    Participate in recreational activities     PREVIOUS TREATMENTS:  Medical: OTC NSAIDS  Physical Therapy: Activities Modified  and Home Exercise Program  Previous Orthopaedic Surgery: Right MELISSA    REVIEW OF SYSTEMS:  PAIN ASSESSMENT:  See HPI.  MUSCULOSKELETAL: See HPI.  OTHER 10 point review of systems is negative except as stated in HPI above    PAST MEDICAL HISTORY   has a past medical history of Head injury (1986) and Nephrolithiasis.     PAST SURGICAL HISTORY   has a past surgical history that includes Hernia repair; Eye surgery; Cervical fusion; Colonoscopy (N/A, 5/30/2018); and Total replacement of hip joint using computer-assisted navigation (Right, 10/3/2018).     FAMILY HISTORY  family history includes Aneurysm in his mother; Pancreatitis in his father.     SOCIAL HISTORY   reports that he has never smoked. He has never used smokeless tobacco. He reports that he does not drink alcohol and does not use drugs.     ALLERGIES   Review of patient's allergies indicates:   Allergen Reactions    Iodinated contrast media Shortness Of Breath and Swelling    Iodine Itching        MEDICATIONS   Current Outpatient Medications on File Prior to Visit   Medication Sig Dispense Refill    amLODIPine (NORVASC) 10 MG tablet Take 1 tablet (10 mg total) by mouth once daily. 90 tablet 0    ascorbic acid (VITAMIN C ORAL) Take by mouth.      fluticasone propionate (FLONASE) 50 mcg/actuation nasal spray 1 spray (50 mcg total) by Each Nostril route once daily. 16 each 0    levocetirizine (XYZAL) 5 MG tablet Take 1 tablet (5 mg total) by mouth every evening. 30 tablet 11    meclizine (ANTIVERT) 25 mg tablet Take 1 tablet (25 mg total) by mouth 3 (three) times daily as needed for Dizziness. 30 tablet 1    meloxicam (MOBIC) 15 MG tablet Take 1 tablet (15 mg total) by mouth once daily. 30 tablet 1    pantoprazole (PROTONIX) 40 MG tablet Take 1 tablet (40 mg total) by mouth once daily. 30 tablet 11    VITAMIN B COMPLEX ORAL Take by  "mouth.      aspirin (ECOTRIN) 81 MG EC tablet Take 1 tablet (81 mg total) by mouth 2 (two) times daily. 60 tablet 0    ibuprofen (ADVIL,MOTRIN) 200 MG tablet Take 200 mg by mouth every 6 (six) hours as needed for Pain. (Patient not taking: Reported on 11/4/2024)       No current facility-administered medications on file prior to visit.          PHYSICAL EXAM   height is 5' 10" (1.778 m) and weight is 94.9 kg (209 lb 3.5 oz).   Body mass index is 30.02 kg/m².      All other systems deferred.  GENERAL:  No acute distress  HABITUS: Normal  GAIT: Antalgic  SKIN: Normal  and No erythema, warmth, fluctuance     HIP EXAM:    left:   ROM:   Flexion: 120 degrees    Internal Rotation: 30 degrees    External Rotation: 30 degrees    Abduction: 45 degrees    Adduction: 20 degrees  No apparent leg length discrepancy    Palpation: No tenderness over greater trochanter, SI joint, ilioposas, IT band, gluteal musculature   Pain is reproduced with IR or ER of the hip  Strength: 5/5 hip flexion, abduction, knee flexion and extension   Straight leg raise: Negative   Neurovascular Status: Sensation intact to light touch in Sural, saphenous, SPN, DPN, Tibial nerve distribution  2+ pulse DP/PT, normal capillary refill, foot has normal warmth    DATA:  Diagnostic tests reviewed for today's visit:     AP pelvis, hip, and lateral radiographs shows severe narrowing of the superior joint space with sclerosis and osteophyte formation. 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 The femoral neck is in netural position. The femoral head is spherical at superior margin. There is no signficant evidence of acetabular dysplasia and the femoral head remains covered.  Right hip hardware appears well-positioned without evidence of loosening or hardware compromise.      "

## 2024-11-07 ENCOUNTER — ANESTHESIA EVENT (OUTPATIENT)
Dept: SURGERY | Facility: HOSPITAL | Age: 64
End: 2024-11-07
Payer: COMMERCIAL

## 2024-11-15 ENCOUNTER — HOSPITAL ENCOUNTER (OUTPATIENT)
Dept: RADIOLOGY | Facility: HOSPITAL | Age: 64
Discharge: HOME | End: 2024-11-15
Attending: ORTHOPAEDIC SURGERY
Payer: COMMERCIAL

## 2024-11-15 DIAGNOSIS — M16.12 PRIMARY OSTEOARTHRITIS OF LEFT HIP: ICD-10-CM

## 2024-11-15 PROCEDURE — 73700 CT LOWER EXTREMITY W/O DYE: CPT | Mod: TC,LT

## 2024-11-15 PROCEDURE — 73700 CT LOWER EXTREMITY W/O DYE: CPT | Mod: 26,LT,, | Performed by: RADIOLOGY

## 2024-11-20 ENCOUNTER — HOSPITAL ENCOUNTER (OUTPATIENT)
Dept: PREADMISSION TESTING | Facility: HOSPITAL | Age: 64
Discharge: HOME | End: 2024-11-20
Attending: ORTHOPAEDIC SURGERY
Payer: COMMERCIAL

## 2024-11-20 ENCOUNTER — HOSPITAL ENCOUNTER (OUTPATIENT)
Dept: RADIOLOGY | Facility: HOSPITAL | Age: 64
Discharge: HOME | End: 2024-11-20
Attending: ORTHOPAEDIC SURGERY
Payer: COMMERCIAL

## 2024-11-20 DIAGNOSIS — Z01.818 PREOP TESTING: Primary | ICD-10-CM

## 2024-11-20 LAB
ALBUMIN SERPL BCP-MCNC: 3.8 G/DL (ref 3.5–5.2)
ALP SERPL-CCNC: 101 U/L (ref 40–150)
ALT SERPL W/O P-5'-P-CCNC: 31 U/L (ref 10–44)
ANION GAP SERPL CALC-SCNC: 11 MMOL/L (ref 8–16)
APTT PPP: 23.8 SEC (ref 21–32)
AST SERPL-CCNC: 23 U/L (ref 10–40)
BASOPHILS # BLD AUTO: 0.11 K/UL (ref 0–0.2)
BASOPHILS NFR BLD: 1.2 % (ref 0–1.9)
BILIRUB SERPL-MCNC: 0.5 MG/DL (ref 0.1–1)
BUN SERPL-MCNC: 18 MG/DL (ref 8–23)
CALCIUM SERPL-MCNC: 10 MG/DL (ref 8.7–10.5)
CHLORIDE SERPL-SCNC: 108 MMOL/L (ref 95–110)
CO2 SERPL-SCNC: 22 MMOL/L (ref 23–29)
CREAT SERPL-MCNC: 1.2 MG/DL (ref 0.5–1.4)
DIFFERENTIAL METHOD BLD: ABNORMAL
EOSINOPHIL # BLD AUTO: 0.5 K/UL (ref 0–0.5)
EOSINOPHIL NFR BLD: 4.8 % (ref 0–8)
ERYTHROCYTE [DISTWIDTH] IN BLOOD BY AUTOMATED COUNT: 12.4 % (ref 11.5–14.5)
EST. GFR  (NO RACE VARIABLE): >60 ML/MIN/1.73 M^2
GLUCOSE SERPL-MCNC: 79 MG/DL (ref 70–110)
HCT VFR BLD AUTO: 47.9 % (ref 40–54)
HGB BLD-MCNC: 15.5 G/DL (ref 14–18)
IMM GRANULOCYTES # BLD AUTO: 0.04 K/UL (ref 0–0.04)
IMM GRANULOCYTES NFR BLD AUTO: 0.4 % (ref 0–0.5)
INR PPP: 0.9 (ref 0.8–1.2)
LYMPHOCYTES # BLD AUTO: 2 K/UL (ref 1–4.8)
LYMPHOCYTES NFR BLD: 20.9 % (ref 18–48)
MCH RBC QN AUTO: 28.2 PG (ref 27–31)
MCHC RBC AUTO-ENTMCNC: 32.4 G/DL (ref 32–36)
MCV RBC AUTO: 87 FL (ref 82–98)
MONOCYTES # BLD AUTO: 1.2 K/UL (ref 0.3–1)
MONOCYTES NFR BLD: 12.6 % (ref 4–15)
NEUTROPHILS # BLD AUTO: 5.7 K/UL (ref 1.8–7.7)
NEUTROPHILS NFR BLD: 60.1 % (ref 38–73)
NRBC BLD-RTO: 0 /100 WBC
PLATELET # BLD AUTO: 225 K/UL (ref 150–450)
PMV BLD AUTO: 10.8 FL (ref 9.2–12.9)
POTASSIUM SERPL-SCNC: 4.8 MMOL/L (ref 3.5–5.1)
PROT SERPL-MCNC: 8.1 G/DL (ref 6–8.4)
PROTHROMBIN TIME: 10.3 SEC (ref 9–12.5)
RBC # BLD AUTO: 5.49 M/UL (ref 4.6–6.2)
SODIUM SERPL-SCNC: 141 MMOL/L (ref 136–145)
WBC # BLD AUTO: 9.46 K/UL (ref 3.9–12.7)

## 2024-11-20 PROCEDURE — 71046 X-RAY EXAM CHEST 2 VIEWS: CPT | Mod: TC,FY

## 2024-11-20 PROCEDURE — 71046 X-RAY EXAM CHEST 2 VIEWS: CPT | Mod: 26,,, | Performed by: RADIOLOGY

## 2024-11-20 PROCEDURE — 80053 COMPREHEN METABOLIC PANEL: CPT | Performed by: ORTHOPAEDIC SURGERY

## 2024-11-20 PROCEDURE — 85730 THROMBOPLASTIN TIME PARTIAL: CPT | Performed by: ORTHOPAEDIC SURGERY

## 2024-11-20 PROCEDURE — 85610 PROTHROMBIN TIME: CPT | Performed by: ORTHOPAEDIC SURGERY

## 2024-11-20 PROCEDURE — 85025 COMPLETE CBC W/AUTO DIFF WBC: CPT | Performed by: ORTHOPAEDIC SURGERY

## 2024-11-20 PROCEDURE — 93005 ELECTROCARDIOGRAM TRACING: CPT

## 2024-11-20 PROCEDURE — 93010 ELECTROCARDIOGRAM REPORT: CPT | Mod: ,,, | Performed by: INTERNAL MEDICINE

## 2024-11-20 NOTE — DISCHARGE INSTRUCTIONS
YOUR PROCEDURE WILL BE AT [facility] at [address] [place]. [address]                 Enter through the Main Entrance facing [address].                 Report to the Same Day Surgery Registration Desk in the hallway.(Just beside the Same Day Surgery Unit)      Your procedure  is scheduled for __12/4/2024________.    Call [phone] between 2pm and 5pm on _12/3/2024______to find out your arrival time for the day of surgery.    You may have two visitors.  No children under 12 years old.     You will be going to the Same Day Surgery Unit on the 2nd floor of the hospital.    Important instructions:  Do not eat anything after midnight.  You may have plain water, non carbonated.  You may also have Gatorade or Powerade after midnight.    Stop all fluids 2 hours before your surgery.    It is okay to brush your teeth.  Do not have gum, candy or mints.    SEE MEDICATION SHEET.   TAKE MEDICATIONS AS DIRECTED.      Do not take any diabetic medication on the morning of surgery unless instructed to do so by your doctor or pre op nurse.      All GLP-1 weekly diabetic/weight loss medications must not be taken for one week before your surgery, or your surgery could be canceled.      STOP taking for 7 days before surgery:    Aspirin           Voltaren (Diclofenac)  Ibuprofen  (Advil, Motrin)                Indomethocin  Mobic (meloxicam, celebrex)      Etodolac   Aleve (naproxen)          Toradol (ketoralac)  Fish oil, Krill oil and Vitamin E  Headache Powders (BC Powder, Goody's Powder, Stanback)                           You may take Tylenol if needed which is not a blood thinner.    Please shower the night before and the morning of your surgery.      Follow any Prep Instructions given by your surgeon.    Use Chlorhexidine soap as instructed by your pre op nurse.   Please place clean linens on your bed the night before surgery. Please wear fresh clean clothing after each shower.    No  shaving of procedural area at least 4-5 days before surgery due to increased risk of skin irritation and/or possible infection.    Female patients may be asked for a urine specimen on the morning of the surgery.  Please check with your nurse before using the restroom.    Contact lenses and removable denture work may not be worn during your procedure.    You may wear deodorant only. If you are having breast surgery, do not wear deodorant on the operative side.    Do not wear powder, body lotion, perfume/cologne or make-up.    Do not wear any jewelry or have any metal on your body.    You will be asked to remove any dentures or partials for the procedure.    If you are going home on the same day of surgery, you must arrange for a family member or a friend to drive you home.  Public transportation is prohibited.  You will not be able to drive home if you were given anesthesia or sedation.    Patients who want to have their Post-op prescriptions filled from our in-house [facility], bring a Credit/Debit Card or cash with you. A co-pay may be required.  The pharmacy closes at 5:30 pm.    Wear loose fitting clothes allowing for bandages.    Please leave money and valuables home.      You may bring your cell phone.    Call the doctor if fever or illness should occur before your surgery.    Call [phone] to contact us here if needed.                            CLOTHES ON DAY OF SURGERY    SHOULDER surgery:  you must have a very oversized shirt.  Very, Very large.  You will probably have a large sling on with your arm strapped to your chest.  You will not be able to put the arm of the operated shoulder into a sleeve.  You can put the arm of the un-operated shoulder into the sleeve, but the shirt will need to be draped over the operated shoulder.       ARM or HAND surgery:  make sure that your sleeves are large and loose enough to pass over large dressings or cast.      BREAST or UNDERARM surgery:  wear a loose, button  down shirt so that you can dress without raising your arms over your head.    ABDOMINAL surgery:  wear loose, comfortable clothing.  Nothing tight around the abdomen.  NO JEANS    PENIS or SCROTAL surgery:  loose comfortable clothing.  Large sweat pants, pajama pants or a robe.  ABSOLUTELY NO JEANS      LEG or FOOT surgery:  wear large loose pants that are able to pass over any large dressings or casts.  You could also wear loose shorts or a skirt.

## 2024-11-20 NOTE — ANESTHESIA PREPROCEDURE EVALUATION
11/20/2024  Pierre Grossman is a 64 y.o., male scheduled for  ARTHROPLASTY, HIP, TOTAL, USING COMPUTER-ASSISTED NAVIGATION (Left: Hip) on 12/4/2024.      Past Medical History:   Diagnosis Date    Essential (primary) hypertension     Head injury 1986    Nephrolithiasis          Past Surgical History:   Procedure Laterality Date    CERVICAL FUSION      COLONOSCOPY N/A 5/30/2018    Procedure: COLONOSCOPY;  Surgeon: Zakia Mirza MD;  [facility];  Service: Endoscopy;  Laterality: N/A;    EYE SURGERY      cataract    HERNIA REPAIR      TOTAL REPLACEMENT OF HIP JOINT USING COMPUTER-ASSISTED NAVIGATION Right 10/3/2018    Procedure: REPLACEMENT-HIP-TOTAL WITH NAVIGATION;  Surgeon: John L. [facility] Jr., MD;  Location: [address];  Service: Orthopedics;  Laterality: Right;           Pre-op Assessment    I have reviewed the Patient Summary Reports.     I have reviewed the Nursing Notes. I have reviewed the NPO Status.   I have reviewed the Medications.     Review of Systems  Anesthesia Hx:  No problems with previous Anesthesia             Denies Family Hx of Anesthesia complications.    Denies Personal Hx of Anesthesia complications.                    Social:  Non-Smoker, No Alcohol Use       Hematology/Oncology:  Hematology Normal   Oncology Normal                                   EENT/Dental:  EENT/Dental Normal           Cardiovascular:  Exercise tolerance: good   Hypertension                  Functional Capacity good / => 4 METS                         Pulmonary:        Sleep Apnea           Education provided regarding risk of obstructive sleep apnea            Renal/:  Renal/ Normal                 Hepatic/GI:  Hepatic/GI Normal                    Musculoskeletal:  Arthritis   L hip            Neurological:  Neurology Normal                                      Endocrine:        Obesity /  BMI > 30  Dermatological:  Skin Normal    Psych:  Psychiatric Normal                    Physical Exam  General: Well nourished, Cooperative, Alert and Oriented    Airway:  Mallampati: II   Mouth Opening: Normal  TM Distance: Normal  Tongue: Normal  Neck ROM: Normal ROM    Dental:  Intact    Chest/Lungs:  Normal Respiratory Rate    Heart:  Rate: Normal  Rhythm: Regular Rhythm        Anesthesia Plan  Type of Anesthesia, risks & benefits discussed:    Anesthesia Type: Spinal, Regional, MAC, Gen ETT  Intra-op Monitoring Plan: Standard ASA Monitors  Post Op Pain Control Plan: peripheral nerve block and multimodal analgesia  Induction:  IV  Airway Plan: Video, Post-Induction  Informed Consent: Informed consent signed with the Patient and all parties understand the risks and agree with anesthesia plan.  All questions answered. Patient consented to blood products? Yes  ASA Score: 2    Ready For Surgery From Anesthesia Perspective.     .

## 2024-11-21 LAB
OHS QRS DURATION: 108 MS
OHS QTC CALCULATION: 413 MS

## 2024-11-27 ENCOUNTER — LAB VISIT (OUTPATIENT)
Dept: LAB | Facility: HOSPITAL | Age: 64
End: 2024-11-27
Attending: ORTHOPAEDIC SURGERY
Payer: COMMERCIAL

## 2024-11-27 DIAGNOSIS — Z01.818 PREOP TESTING: ICD-10-CM

## 2024-11-27 LAB
ABO + RH BLD: NORMAL
BLD GP AB SCN CELLS X3 SERPL QL: NORMAL
SPECIMEN OUTDATE: NORMAL

## 2024-11-27 PROCEDURE — 86900 BLOOD TYPING SEROLOGIC ABO: CPT | Performed by: ORTHOPAEDIC SURGERY

## 2024-11-27 PROCEDURE — 36415 COLL VENOUS BLD VENIPUNCTURE: CPT | Performed by: ORTHOPAEDIC SURGERY

## 2024-12-03 ENCOUNTER — TELEPHONE (OUTPATIENT)
Dept: SURGERY | Facility: HOSPITAL | Age: 64
End: 2024-12-03
Payer: COMMERCIAL

## 2024-12-03 NOTE — H&P
EST PATIENT ORTHOPAEDIC: HIP    PRIMARY CARE PHYSICIAN: Socorro Payton MD   REFERRING PROVIDER: Mc Colby MD  [address] [place] [address]     ASSESSMENT & PLAN:    Impression:  Left Hip Severe Degenerative Osteoarthritis, Primary  Right hip s/p MELISSA by Dr. [facility] 2018    Follow Up Plan: 3 weeks after surgery    Surgery:     Pierre Grossman has radiograph and physical exam evidence of the aforementioned impression and wishes to pursue surgery. This patient appears to have sufficient symptoms to warrant surgical intervention and is an appropriate candidate for left Primary Total Hip Arthroplasty as evidenced by months of unsuccessful non-operative treatment as outlined in the HPI below and progressive symptoms.    We had a lengthy discussion regarding the risk and benefit of surgery, the alternatives, limitations and personnel involved. These included but were not limited to infection, persistent pain, instability, nerve injury, blood clots, dislocation, loosening, leg length inequality and medical complications. We also discussed the pre-operative course, surgery itself and rehabilitation.    Janet-operative blood management and transfusion issues were discussed, and options clearly outlined. The patient has consented to the use of the banked allogenic blood if medically necessary.    The patient has elected to schedule surgery at this time or intends to call the office with a surgical date. Shared decision making occurred while obtaining informed consent. The patient will be scheduled for a pre-operative education class at which time they will have their nasal swab completed and will be given CHG cloths along with the verbal and written instructions for their use.    We will plan for use of Yomi Accolade 2/Trident 2 components, with LEMUEL assistance.     Tentative Surgery Date: 12/4/24. Attempt same day    Patient comes in with greater than 1 month history of having atraumatic onset left hip  pain.  This is groin pain.  He also has gluteal pain with some extension into his knee.  These are similar pains that he had on his right side prior to hip replacement in 2018.  He did really well after that hip surgery.  It alleviated his pains.  No tiffany operational issues.  He is interested in having a left hip replaced.  His pain has been refractory to anti-inflammatory medications.  He is having functional ADLs difficulty getting out of his truck.    The patient has been ordered:  CT (LEMUEL Protocol)  Pain Prescription    CONSULTS:   Anesthesia for pre-surgical optimization    ACTIVE PROBLEM LIST  Patient Active Problem List   Diagnosis    DOMINIK (obstructive sleep apnea)    Primary osteoarthritis of right hip    Status post total hip replacement, right 10/3/2018    Nephrolithiasis           SUBJECTIVE    CHIEF COMPLAINT: Hip Pain    HPI:   Pierre Grossman is a 64 y.o. male here for evaluation and management of left hip pain. There is not a specific incident that brought about this pain. he has had progressive problems with the hip(s) starting 1 months ago and is progressing which is now interfering with activities which include: walking, functional household ADL's, participating in family activities, enjoying hobbies, rising from a sitting position, standing for prolonged periods of time, getting in and out of a car, and climbing stairs     Currently the pain in the joint is moderate with activity.  The pain is constant and is located in buttocks, lateral hip, groin, and thigh.  The pain is described as aching, severe, and sharp. Relieving factors include rest, ice or heat, and over the counter medication . No associated Catching, Clicking, and Popping.     They do not report leg length inequality.     Pierre Grossman has no additional complaints.     11/4/24: Here for pre surgical planning.     PROGRESSIVE SYMPTOMS:  Pain impacting work  Pain worsened by weight bearing  Pain effecting living situation    FUNCTIONAL  STATUS:   Participate in recreational activities     PREVIOUS TREATMENTS:  Medical: OTC NSAIDS  Physical Therapy: Activities Modified  and Home Exercise Program  Previous Orthopaedic Surgery: Right MELISSA    REVIEW OF SYSTEMS:  PAIN ASSESSMENT:  See HPI.  MUSCULOSKELETAL: See HPI.  OTHER 10 point review of systems is negative except as stated in HPI above    PAST MEDICAL HISTORY   has a past medical history of Essential (primary) hypertension, Head injury (1986), and Nephrolithiasis.     PAST SURGICAL HISTORY   has a past surgical history that includes Hernia repair; Eye surgery; Cervical fusion; Colonoscopy (N/A, 5/30/2018); and Total replacement of hip joint using computer-assisted navigation (Right, 10/3/2018).     FAMILY HISTORY  family history includes Aneurysm in his mother; Pancreatitis in his father.     SOCIAL HISTORY   reports that he has never smoked. He has never used smokeless tobacco. He reports that he does not drink alcohol and does not use drugs.     ALLERGIES   Review of patient's allergies indicates:   Allergen Reactions    Iodinated contrast media Shortness Of Breath and Swelling    Iodine Itching        MEDICATIONS   No current facility-administered medications on file prior to encounter.     Current Outpatient Medications on File Prior to Encounter   Medication Sig Dispense Refill    ascorbic acid (VITAMIN C ORAL) Take by mouth.      aspirin (ECOTRIN) 81 MG EC tablet Take 1 tablet (81 mg total) by mouth 2 (two) times daily. 60 tablet 0    fluticasone propionate (FLONASE) 50 mcg/actuation nasal spray 1 spray (50 mcg total) by Each Nostril route once daily. 16 each 0    ibuprofen (ADVIL,MOTRIN) 200 MG tablet Take 200 mg by mouth every 6 (six) hours as needed for Pain.      levocetirizine (XYZAL) 5 MG tablet Take 1 tablet (5 mg total) by mouth every evening. 30 tablet 11    meclizine (ANTIVERT) 25 mg tablet Take 1 tablet (25 mg total) by mouth 3 (three) times daily as needed for Dizziness. 30 tablet  1    meloxicam (MOBIC) 15 MG tablet Take 1 tablet (15 mg total) by mouth once daily. 30 tablet 1    pantoprazole (PROTONIX) 40 MG tablet Take 1 tablet (40 mg total) by mouth once daily. 30 tablet 11    VITAMIN B COMPLEX ORAL Take by mouth.            PHYSICAL EXAM   weight is 94.9 kg (209 lb 4.8 oz).   Body mass index is 30.03 kg/m².      All other systems deferred.  GENERAL:  No acute distress  HABITUS: Normal  GAIT: Antalgic  SKIN: Normal  and No erythema, warmth, fluctuance   CV: RRR  Pulm: Non labored breathing    HIP EXAM:    left:   ROM:   Flexion: 120 degrees    Internal Rotation: 30 degrees    External Rotation: 30 degrees    Abduction: 45 degrees    Adduction: 20 degrees  No apparent leg length discrepancy    Palpation: No tenderness over greater trochanter, SI joint, ilioposas, IT band, gluteal musculature   Pain is reproduced with IR or ER of the hip  Strength: 5/5 hip flexion, abduction, knee flexion and extension   Straight leg raise: Negative   Neurovascular Status: Sensation intact to light touch in Sural, saphenous, SPN, DPN, Tibial nerve distribution  2+ pulse DP/PT, normal capillary refill, foot has normal warmth    DATA:  Diagnostic tests reviewed for today's visit:     AP pelvis, hip, and lateral radiographs shows severe narrowing of the superior joint space with sclerosis and osteophyte formation. 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 The femoral neck is in netural position. The femoral head is spherical at superior margin. There is no signficant evidence of acetabular dysplasia and the femoral head remains covered.  Right hip hardware appears well-positioned without evidence of loosening or hardware compromise.

## 2024-12-03 NOTE — PRE ADMISSION SCREENING
Message sent to Dr Colby to notify him that the H/P on this pt needs to be completed surgery 12/4/24.

## 2024-12-04 ENCOUNTER — HOSPITAL ENCOUNTER (OUTPATIENT)
Facility: HOSPITAL | Age: 64
Discharge: HOME HEALTH | End: 2024-12-04
Attending: ORTHOPAEDIC SURGERY | Admitting: ORTHOPAEDIC SURGERY
Payer: COMMERCIAL

## 2024-12-04 ENCOUNTER — ANESTHESIA (OUTPATIENT)
Dept: SURGERY | Facility: HOSPITAL | Age: 64
End: 2024-12-04
Payer: COMMERCIAL

## 2024-12-04 VITALS
SYSTOLIC BLOOD PRESSURE: 115 MMHG | DIASTOLIC BLOOD PRESSURE: 69 MMHG | BODY MASS INDEX: 30.03 KG/M2 | TEMPERATURE: 98 F | HEART RATE: 56 BPM | WEIGHT: 209.31 LBS | OXYGEN SATURATION: 93 % | RESPIRATION RATE: 20 BRPM

## 2024-12-04 DIAGNOSIS — M16.12 PRIMARY OSTEOARTHRITIS OF LEFT HIP: Primary | ICD-10-CM

## 2024-12-04 DIAGNOSIS — M16.12 ARTHRITIS OF LEFT HIP: ICD-10-CM

## 2024-12-04 PROCEDURE — 0055T BONE SRGRY CMPTR CT/MRI IMAG: CPT | Mod: ,,, | Performed by: ORTHOPAEDIC SURGERY

## 2024-12-04 PROCEDURE — 63600175 PHARM REV CODE 636 W HCPCS: Performed by: NURSE ANESTHETIST, CERTIFIED REGISTERED

## 2024-12-04 PROCEDURE — 25000003 PHARM REV CODE 250: Performed by: ORTHOPAEDIC SURGERY

## 2024-12-04 PROCEDURE — 97162 PT EVAL MOD COMPLEX 30 MIN: CPT

## 2024-12-04 PROCEDURE — 36000712 HC OR TIME LEV V 1ST 15 MIN: Performed by: ORTHOPAEDIC SURGERY

## 2024-12-04 PROCEDURE — 37000008 HC ANESTHESIA 1ST 15 MINUTES: Performed by: ORTHOPAEDIC SURGERY

## 2024-12-04 PROCEDURE — 71000033 HC RECOVERY, INTIAL HOUR: Performed by: ORTHOPAEDIC SURGERY

## 2024-12-04 PROCEDURE — 27200688 HC TRAY, SPINAL-HYPER/ ISOBARIC: Performed by: STUDENT IN AN ORGANIZED HEALTH CARE EDUCATION/TRAINING PROGRAM

## 2024-12-04 PROCEDURE — 63600175 PHARM REV CODE 636 W HCPCS: Performed by: STUDENT IN AN ORGANIZED HEALTH CARE EDUCATION/TRAINING PROGRAM

## 2024-12-04 PROCEDURE — 71000015 HC POSTOP RECOV 1ST HR: Performed by: ORTHOPAEDIC SURGERY

## 2024-12-04 PROCEDURE — 37000009 HC ANESTHESIA EA ADD 15 MINS: Performed by: ORTHOPAEDIC SURGERY

## 2024-12-04 PROCEDURE — 36000713 HC OR TIME LEV V EA ADD 15 MIN: Performed by: ORTHOPAEDIC SURGERY

## 2024-12-04 PROCEDURE — 97165 OT EVAL LOW COMPLEX 30 MIN: CPT

## 2024-12-04 PROCEDURE — 64450 NJX AA&/STRD OTHER PN/BRANCH: CPT | Performed by: STUDENT IN AN ORGANIZED HEALTH CARE EDUCATION/TRAINING PROGRAM

## 2024-12-04 PROCEDURE — 25000003 PHARM REV CODE 250: Performed by: STUDENT IN AN ORGANIZED HEALTH CARE EDUCATION/TRAINING PROGRAM

## 2024-12-04 PROCEDURE — 27201423 OPTIME MED/SURG SUP & DEVICES STERILE SUPPLY: Performed by: ORTHOPAEDIC SURGERY

## 2024-12-04 PROCEDURE — 71000016 HC POSTOP RECOV ADDL HR: Performed by: ORTHOPAEDIC SURGERY

## 2024-12-04 PROCEDURE — C1776 JOINT DEVICE (IMPLANTABLE): HCPCS | Performed by: ORTHOPAEDIC SURGERY

## 2024-12-04 PROCEDURE — 97535 SELF CARE MNGMENT TRAINING: CPT

## 2024-12-04 PROCEDURE — 63600175 PHARM REV CODE 636 W HCPCS: Performed by: ORTHOPAEDIC SURGERY

## 2024-12-04 PROCEDURE — 27130 TOTAL HIP ARTHROPLASTY: CPT | Mod: LT,,, | Performed by: ORTHOPAEDIC SURGERY

## 2024-12-04 PROCEDURE — 27200750 HC INSULATED NEEDLE/ STIMUPLEX: Performed by: STUDENT IN AN ORGANIZED HEALTH CARE EDUCATION/TRAINING PROGRAM

## 2024-12-04 PROCEDURE — 71000039 HC RECOVERY, EACH ADD'L HOUR: Performed by: ORTHOPAEDIC SURGERY

## 2024-12-04 DEVICE — TRIDENT X3 0 DEGREE POLYETHYLENE INSERT
Type: IMPLANTABLE DEVICE | Site: HIP | Status: FUNCTIONAL
Brand: TRIDENT X3 INSERT

## 2024-12-04 DEVICE — 127 DEGREE NECK ANGLE HIP STEM
Type: IMPLANTABLE DEVICE | Site: HIP | Status: FUNCTIONAL
Brand: ACCOLADE

## 2024-12-04 DEVICE — TRIDENT II TRITANIUM CLUSTER 54E
Type: IMPLANTABLE DEVICE | Site: HIP | Status: FUNCTIONAL
Brand: TRIDENT II

## 2024-12-04 DEVICE — CERAMIC V40 FEMORAL HEAD
Type: IMPLANTABLE DEVICE | Site: HIP | Status: FUNCTIONAL
Brand: BIOLOX

## 2024-12-04 RX ORDER — OXYCODONE HYDROCHLORIDE 5 MG/1
5 TABLET ORAL
Status: DISCONTINUED | OUTPATIENT
Start: 2024-12-04 | End: 2024-12-04 | Stop reason: HOSPADM

## 2024-12-04 RX ORDER — ACETAMINOPHEN 10 MG/ML
1000 INJECTION, SOLUTION INTRAVENOUS ONCE
Status: DISCONTINUED | OUTPATIENT
Start: 2024-12-04 | End: 2024-12-04 | Stop reason: HOSPADM

## 2024-12-04 RX ORDER — CELECOXIB 200 MG/1
200 CAPSULE ORAL 2 TIMES DAILY
Qty: 14 CAPSULE | Refills: 0 | Status: SHIPPED | OUTPATIENT
Start: 2024-12-04

## 2024-12-04 RX ORDER — MUPIROCIN 20 MG/G
1 OINTMENT TOPICAL 2 TIMES DAILY
Status: DISCONTINUED | OUTPATIENT
Start: 2024-12-04 | End: 2024-12-04 | Stop reason: HOSPADM

## 2024-12-04 RX ORDER — PROPOFOL 10 MG/ML
VIAL (ML) INTRAVENOUS CONTINUOUS PRN
Status: DISCONTINUED | OUTPATIENT
Start: 2024-12-04 | End: 2024-12-04

## 2024-12-04 RX ORDER — ONDANSETRON HYDROCHLORIDE 2 MG/ML
4 INJECTION, SOLUTION INTRAVENOUS DAILY PRN
Status: DISCONTINUED | OUTPATIENT
Start: 2024-12-04 | End: 2024-12-04 | Stop reason: HOSPADM

## 2024-12-04 RX ORDER — SODIUM CHLORIDE 0.9 % (FLUSH) 0.9 %
10 SYRINGE (ML) INJECTION
Status: DISCONTINUED | OUTPATIENT
Start: 2024-12-04 | End: 2024-12-04 | Stop reason: HOSPADM

## 2024-12-04 RX ORDER — PROCHLORPERAZINE EDISYLATE 5 MG/ML
5 INJECTION INTRAMUSCULAR; INTRAVENOUS EVERY 6 HOURS PRN
Status: DISCONTINUED | OUTPATIENT
Start: 2024-12-04 | End: 2024-12-04 | Stop reason: HOSPADM

## 2024-12-04 RX ORDER — MIDAZOLAM HYDROCHLORIDE 1 MG/ML
INJECTION INTRAMUSCULAR; INTRAVENOUS
Status: DISCONTINUED | OUTPATIENT
Start: 2024-12-04 | End: 2024-12-04

## 2024-12-04 RX ORDER — METHOCARBAMOL 750 MG/1
750 TABLET, FILM COATED ORAL 3 TIMES DAILY
Status: DISCONTINUED | OUTPATIENT
Start: 2024-12-04 | End: 2024-12-04 | Stop reason: HOSPADM

## 2024-12-04 RX ORDER — MORPHINE SULFATE 4 MG/ML
2 INJECTION, SOLUTION INTRAMUSCULAR; INTRAVENOUS
Status: DISCONTINUED | OUTPATIENT
Start: 2024-12-04 | End: 2024-12-04 | Stop reason: HOSPADM

## 2024-12-04 RX ORDER — NALOXONE HCL 0.4 MG/ML
0.02 VIAL (ML) INJECTION
Status: DISCONTINUED | OUTPATIENT
Start: 2024-12-04 | End: 2024-12-04 | Stop reason: HOSPADM

## 2024-12-04 RX ORDER — FENTANYL CITRATE 50 UG/ML
25 INJECTION, SOLUTION INTRAMUSCULAR; INTRAVENOUS EVERY 5 MIN PRN
Status: DISCONTINUED | OUTPATIENT
Start: 2024-12-04 | End: 2024-12-04 | Stop reason: HOSPADM

## 2024-12-04 RX ORDER — METHOCARBAMOL 500 MG/1
500 TABLET, FILM COATED ORAL ONCE AS NEEDED
Status: COMPLETED | OUTPATIENT
Start: 2024-12-04 | End: 2024-12-04

## 2024-12-04 RX ORDER — TRANEXAMIC ACID 10 MG/ML
1000 INJECTION, SOLUTION INTRAVENOUS
Status: COMPLETED | OUTPATIENT
Start: 2024-12-04 | End: 2024-12-04

## 2024-12-04 RX ORDER — SODIUM CHLORIDE 9 MG/ML
INJECTION, SOLUTION INTRAVENOUS CONTINUOUS
Status: DISCONTINUED | OUTPATIENT
Start: 2024-12-04 | End: 2024-12-04 | Stop reason: HOSPADM

## 2024-12-04 RX ORDER — ASPIRIN 81 MG/1
81 TABLET ORAL 2 TIMES DAILY
Status: DISCONTINUED | OUTPATIENT
Start: 2024-12-04 | End: 2024-12-04 | Stop reason: HOSPADM

## 2024-12-04 RX ORDER — TRANEXAMIC ACID 10 MG/ML
1000 INJECTION, SOLUTION INTRAVENOUS
Status: DISCONTINUED | OUTPATIENT
Start: 2024-12-04 | End: 2024-12-04 | Stop reason: HOSPADM

## 2024-12-04 RX ORDER — OXYCODONE HYDROCHLORIDE 5 MG/1
10 TABLET ORAL
Status: DISCONTINUED | OUTPATIENT
Start: 2024-12-04 | End: 2024-12-04 | Stop reason: HOSPADM

## 2024-12-04 RX ORDER — ROPIVACAINE/EPI/CLONIDINE/KET 2.46-0.005
SYRINGE (ML) INJECTION
Status: DISCONTINUED | OUTPATIENT
Start: 2024-12-04 | End: 2024-12-04 | Stop reason: HOSPADM

## 2024-12-04 RX ORDER — ACETAMINOPHEN 500 MG
1000 TABLET ORAL
Status: DISCONTINUED | OUTPATIENT
Start: 2024-12-04 | End: 2024-12-04 | Stop reason: HOSPADM

## 2024-12-04 RX ORDER — OXYCODONE HYDROCHLORIDE 5 MG/1
5-10 TABLET ORAL EVERY 4 HOURS PRN
Qty: 40 TABLET | Refills: 0 | Status: SHIPPED | OUTPATIENT
Start: 2024-12-04

## 2024-12-04 RX ORDER — ACETAMINOPHEN 500 MG
1000 TABLET ORAL
Status: COMPLETED | OUTPATIENT
Start: 2024-12-04 | End: 2024-12-04

## 2024-12-04 RX ORDER — LIDOCAINE HYDROCHLORIDE 20 MG/ML
INJECTION INTRAVENOUS
Status: DISCONTINUED | OUTPATIENT
Start: 2024-12-04 | End: 2024-12-04

## 2024-12-04 RX ORDER — CELECOXIB 100 MG/1
200 CAPSULE ORAL DAILY
Status: DISCONTINUED | OUTPATIENT
Start: 2024-12-04 | End: 2024-12-04 | Stop reason: HOSPADM

## 2024-12-04 RX ORDER — MUPIROCIN 20 MG/G
1 OINTMENT TOPICAL
Status: COMPLETED | OUTPATIENT
Start: 2024-12-04 | End: 2024-12-04

## 2024-12-04 RX ORDER — AMOXICILLIN 250 MG
1 CAPSULE ORAL 2 TIMES DAILY
Status: DISCONTINUED | OUTPATIENT
Start: 2024-12-04 | End: 2024-12-04 | Stop reason: HOSPADM

## 2024-12-04 RX ORDER — HYDROMORPHONE HYDROCHLORIDE 2 MG/ML
0.2 INJECTION, SOLUTION INTRAMUSCULAR; INTRAVENOUS; SUBCUTANEOUS EVERY 5 MIN PRN
Status: DISCONTINUED | OUTPATIENT
Start: 2024-12-04 | End: 2024-12-04 | Stop reason: HOSPADM

## 2024-12-04 RX ORDER — PREGABALIN 75 MG/1
75 CAPSULE ORAL 2 TIMES DAILY
Qty: 28 CAPSULE | Refills: 0 | Status: SHIPPED | OUTPATIENT
Start: 2024-12-04 | End: 2024-12-18

## 2024-12-04 RX ORDER — METHOCARBAMOL 500 MG/1
500 TABLET, FILM COATED ORAL 4 TIMES DAILY
Qty: 40 TABLET | Refills: 0 | Status: SHIPPED | OUTPATIENT
Start: 2024-12-04 | End: 2024-12-14

## 2024-12-04 RX ORDER — FENTANYL CITRATE 50 UG/ML
INJECTION, SOLUTION INTRAMUSCULAR; INTRAVENOUS
Status: DISCONTINUED | OUTPATIENT
Start: 2024-12-04 | End: 2024-12-04

## 2024-12-04 RX ORDER — ACETAMINOPHEN 500 MG
1000 TABLET ORAL EVERY 6 HOURS
Status: DISCONTINUED | OUTPATIENT
Start: 2024-12-04 | End: 2024-12-04 | Stop reason: HOSPADM

## 2024-12-04 RX ORDER — SODIUM CHLORIDE 0.9 % (FLUSH) 0.9 %
10 SYRINGE (ML) INJECTION ONCE
Status: DISCONTINUED | OUTPATIENT
Start: 2024-12-04 | End: 2024-12-04 | Stop reason: HOSPADM

## 2024-12-04 RX ORDER — PHENYLEPHRINE HYDROCHLORIDE 10 MG/ML
INJECTION INTRAVENOUS
Status: DISCONTINUED | OUTPATIENT
Start: 2024-12-04 | End: 2024-12-04

## 2024-12-04 RX ORDER — FAMOTIDINE 20 MG/1
20 TABLET, FILM COATED ORAL 2 TIMES DAILY
Status: DISCONTINUED | OUTPATIENT
Start: 2024-12-04 | End: 2024-12-04 | Stop reason: HOSPADM

## 2024-12-04 RX ORDER — CELECOXIB 100 MG/1
400 CAPSULE ORAL ONCE
Status: COMPLETED | OUTPATIENT
Start: 2024-12-04 | End: 2024-12-04

## 2024-12-04 RX ORDER — LIDOCAINE HYDROCHLORIDE 10 MG/ML
1 INJECTION, SOLUTION EPIDURAL; INFILTRATION; INTRACAUDAL; PERINEURAL
Status: DISCONTINUED | OUTPATIENT
Start: 2024-12-04 | End: 2024-12-04 | Stop reason: HOSPADM

## 2024-12-04 RX ORDER — ROPIVACAINE HYDROCHLORIDE 5 MG/ML
INJECTION, SOLUTION EPIDURAL; INFILTRATION; PERINEURAL
Status: COMPLETED | OUTPATIENT
Start: 2024-12-04 | End: 2024-12-04

## 2024-12-04 RX ORDER — ONDANSETRON HYDROCHLORIDE 2 MG/ML
4 INJECTION, SOLUTION INTRAVENOUS EVERY 8 HOURS PRN
Status: DISCONTINUED | OUTPATIENT
Start: 2024-12-04 | End: 2024-12-04 | Stop reason: HOSPADM

## 2024-12-04 RX ORDER — CEFAZOLIN 2 G/1
2 INJECTION, POWDER, FOR SOLUTION INTRAMUSCULAR; INTRAVENOUS
Status: COMPLETED | OUTPATIENT
Start: 2024-12-04 | End: 2024-12-04

## 2024-12-04 RX ORDER — SODIUM CHLORIDE 9 MG/ML
INJECTION, SOLUTION INTRAVENOUS
Status: DISCONTINUED | OUTPATIENT
Start: 2024-12-04 | End: 2024-12-04 | Stop reason: HOSPADM

## 2024-12-04 RX ORDER — GLUCAGON 1 MG
1 KIT INJECTION
Status: DISCONTINUED | OUTPATIENT
Start: 2024-12-04 | End: 2024-12-04 | Stop reason: HOSPADM

## 2024-12-04 RX ORDER — ACETAMINOPHEN 500 MG
1000 TABLET ORAL EVERY 8 HOURS PRN
Qty: 42 TABLET | Refills: 0 | Status: SHIPPED | OUTPATIENT
Start: 2024-12-04

## 2024-12-04 RX ORDER — CEFAZOLIN 2 G/1
2 INJECTION, POWDER, FOR SOLUTION INTRAMUSCULAR; INTRAVENOUS
Status: DISCONTINUED | OUTPATIENT
Start: 2024-12-04 | End: 2024-12-04 | Stop reason: HOSPADM

## 2024-12-04 RX ORDER — DOCUSATE SODIUM 100 MG/1
100 CAPSULE, LIQUID FILLED ORAL 2 TIMES DAILY
Qty: 30 CAPSULE | Refills: 0 | Status: SHIPPED | OUTPATIENT
Start: 2024-12-04

## 2024-12-04 RX ORDER — LIDOCAINE HYDROCHLORIDE 10 MG/ML
1 INJECTION, SOLUTION EPIDURAL; INFILTRATION; INTRACAUDAL; PERINEURAL ONCE
Status: DISCONTINUED | OUTPATIENT
Start: 2024-12-04 | End: 2024-12-04 | Stop reason: HOSPADM

## 2024-12-04 RX ORDER — ASPIRIN 81 MG/1
81 TABLET ORAL 2 TIMES DAILY
Qty: 60 TABLET | Refills: 0 | Status: SHIPPED | OUTPATIENT
Start: 2024-12-04 | End: 2025-01-03

## 2024-12-04 RX ORDER — POLYETHYLENE GLYCOL 3350 17 G/17G
17 POWDER, FOR SOLUTION ORAL DAILY
Status: DISCONTINUED | OUTPATIENT
Start: 2024-12-04 | End: 2024-12-04 | Stop reason: HOSPADM

## 2024-12-04 RX ADMIN — ACETAMINOPHEN 1000 MG: 500 TABLET ORAL at 12:12

## 2024-12-04 RX ADMIN — SENNOSIDES AND DOCUSATE SODIUM 1 TABLET: 50; 8.6 TABLET ORAL at 10:12

## 2024-12-04 RX ADMIN — MUPIROCIN 1 G: 20 OINTMENT TOPICAL at 06:12

## 2024-12-04 RX ADMIN — POLYETHYLENE GLYCOL 3350 17 G: 17 POWDER, FOR SOLUTION ORAL at 12:12

## 2024-12-04 RX ADMIN — OXYCODONE 10 MG: 5 TABLET ORAL at 10:12

## 2024-12-04 RX ADMIN — FENTANYL CITRATE 25 MCG: 50 INJECTION, SOLUTION INTRAMUSCULAR; INTRAVENOUS at 10:12

## 2024-12-04 RX ADMIN — CELECOXIB 400 MG: 100 CAPSULE ORAL at 06:12

## 2024-12-04 RX ADMIN — PREGABALIN 75 MG: 50 CAPSULE ORAL at 06:12

## 2024-12-04 RX ADMIN — TRANEXAMIC ACID 1000 MG: 10 INJECTION, SOLUTION INTRAVENOUS at 07:12

## 2024-12-04 RX ADMIN — FENTANYL CITRATE 50 MCG: 50 INJECTION, SOLUTION INTRAMUSCULAR; INTRAVENOUS at 08:12

## 2024-12-04 RX ADMIN — SODIUM CHLORIDE, SODIUM LACTATE, POTASSIUM CHLORIDE, AND CALCIUM CHLORIDE: .6; .31; .03; .02 INJECTION, SOLUTION INTRAVENOUS at 07:12

## 2024-12-04 RX ADMIN — MIDAZOLAM HYDROCHLORIDE 2 MG: 1 INJECTION INTRAMUSCULAR; INTRAVENOUS at 07:12

## 2024-12-04 RX ADMIN — HYDROMORPHONE HYDROCHLORIDE 0.2 MG: 2 INJECTION INTRAMUSCULAR; INTRAVENOUS; SUBCUTANEOUS at 09:12

## 2024-12-04 RX ADMIN — MEPIVACAINE HYDROCHLORIDE 3 ML: 15 INJECTION, SOLUTION EPIDURAL; INFILTRATION at 07:12

## 2024-12-04 RX ADMIN — ONDANSETRON 4 MG: 2 INJECTION INTRAMUSCULAR; INTRAVENOUS at 12:12

## 2024-12-04 RX ADMIN — TRANEXAMIC ACID 1000 MG: 10 INJECTION, SOLUTION INTRAVENOUS at 08:12

## 2024-12-04 RX ADMIN — FENTANYL CITRATE 50 MCG: 50 INJECTION, SOLUTION INTRAMUSCULAR; INTRAVENOUS at 07:12

## 2024-12-04 RX ADMIN — ACETAMINOPHEN 1000 MG: 500 TABLET ORAL at 06:12

## 2024-12-04 RX ADMIN — FAMOTIDINE 20 MG: 20 TABLET ORAL at 10:12

## 2024-12-04 RX ADMIN — ROPIVACAINE HYDROCHLORIDE 20 ML: 5 INJECTION, SOLUTION EPIDURAL; INFILTRATION; PERINEURAL at 07:12

## 2024-12-04 RX ADMIN — METHOCARBAMOL 500 MG: 500 TABLET ORAL at 10:12

## 2024-12-04 RX ADMIN — CEFAZOLIN 2 G: 2 INJECTION, POWDER, FOR SOLUTION INTRAMUSCULAR; INTRAVENOUS at 07:12

## 2024-12-04 RX ADMIN — PHENYLEPHRINE HYDROCHLORIDE 150 MCG: 10 INJECTION INTRAVENOUS at 08:12

## 2024-12-04 RX ADMIN — ASPIRIN 81 MG: 81 TABLET, COATED ORAL at 10:12

## 2024-12-04 RX ADMIN — SODIUM CHLORIDE, SODIUM LACTATE, POTASSIUM CHLORIDE, AND CALCIUM CHLORIDE: .6; .31; .03; .02 INJECTION, SOLUTION INTRAVENOUS at 08:12

## 2024-12-04 RX ADMIN — LIDOCAINE HYDROCHLORIDE 100 MG: 20 INJECTION, SOLUTION INTRAVENOUS at 07:12

## 2024-12-04 RX ADMIN — PROPOFOL 70 MCG/KG/MIN: 10 INJECTION, EMULSION INTRAVENOUS at 07:12

## 2024-12-04 NOTE — OP NOTE
OPERATIVE NOTE     PATIENT NAME: Pierre Grossman   MRN: [id number]      Surgery Date: 12/4/24  Surgeon(s) and Assistant(s): Mc Colby MD. Val [facility]      Procedure(s): left Primary Total Hip Arthroplasty     BMI:  Body mass index is 30.03 kg/m².      Anesthesia:  Spinal        Attestation:   I was present for all of the critical portions of the operation and performed all critical portions.  The medical assistant performed the superficial closure under supervision, and assisted during the operation. I was immediately available for the duration of the entire case.      Preoperative Diagnosis:  left  Hip Arthritis     Postoperative Diagnosis: Same     Findings:  See Full Operative Report    Complications: None     EBL: 200cc     Implants:   Implant Name Type Inv. Item Serial No.  Lot No. LRB No. Used Action   SHELL TRIDENT II ACET E 54MM - [id number]  SHELL TRIDENT II ACET E 54MM  [employer]. [id number] Left 1 Implanted   INSERT TRIDENT X3 ID 36MM 0 DE - [id number]  INSERT TRIDENT X3 ID 36MM 0 DE  [employer]. [id number] Left 1 Implanted   STEM FEM ACCOLADE2 SZ6 [id number] - [id number]  STEM FEM ACCOLADE2 SZ6 35X111  [employer] CLAUDIA. [id number] Left 1 Implanted   HEAD FEM DELTA V40 36MM BIO - [id number]  HEAD FEM DELTA V40 36MM BIO  [employer]. [id number] Left 1 Implanted       Drains: None     Problem List:   Problem List Items Addressed This Visit    None  Visit Diagnoses       Primary osteoarthritis of left hip    -  Primary    Relevant Medications    oxyCODONE (ROXICODONE) 5 MG immediate release tablet    pregabalin (LYRICA) 75 MG capsule    Other Relevant Orders    Place in Outpatient (Completed)    Vital signs    Notify Physician - Potential Need of Opioid Reversal    Notify Anesthesiologist if patient on home insulin pump    Vital signs - notify MD    Diet NPO    Activity as tolerated     Lifting restrictions    Weight bearing as tolerated    No driving, operating heavy equipment or signing legal documents while taking pain medication    Call MD for:  temperature >100.4    Call MD for:  persistent nausea and vomiting    Call MD for:  severe uncontrolled pain    Call MD for:  difficulty breathing, headache or visual disturbances    Call MD for:  redness, tenderness, or signs of infection (pain, swelling, redness, odor or green/yellow discharge around incision site)    Call MD for:  hives    Call MD for:  persistent dizziness or light-headedness    Call MD for:  extreme fatigue    Insert peripheral IV    FOOT COMPRESSION PUMP    Place foot compression device    Chlorhexidine (CHG) 2% Wipes    Ortho Pathway Patient    WALKER FOR HOME USE    Arthritis of left hip                   OPERATIVE INDICATIONS: The patient has a history of progressive left hip pain and arthritis. Their hip pain is severe with activity and has progressed significantly. X-rays reveal eburnation of articular cartilage on the superior weight-bearing surface of the hip with joint space narrowing and acetabular and femoral neck osteophytes consistent with advanced hip osteoarthritis. Non-operative treatment has been attempted, but has not improved or controlled symptoms during normal daily activities. Motion has become limited and rotation severely restricted. A total hip arthroplasty was recommended at this time. The risks, benefits and potential complications of the arthroplasty surgery were discussed with the patient in detail. Specific details of the procedure, hospitalization, recovery, rehabilitation, and long-term precautions were also provided. Pre-operative teaching was provided. Implant/prosthesis selection was outlined, and the many options available were explained; the final choice will be made at the time of the procedure to match the anatomy and condition of the bone, ligaments, tendons, and muscles. Understanding of all  topics was conveyed to me by the patient, and consent was given to proceed with a total hip arthroplasty.      The patient was seen by Internal Medicine/Anesthesia for pre-operative optimization. Janet-operative blood management and the potential for blood transfusion were discussed with risks and options clearly outlined. We discussed the risks of the procedure in detail, which included but is not limited to infection, bleeding, scarring, injury to blood vessels, nerves, muscular structures. We discussed specifically instability/dislocation, fracture, and leg-length complications.      OPERATIVE PROCEDURE: The patient was identified and brought into the Operating Room by the anesthesia and nursing team. Spinal anesthesia was successfully performed.  Intravenous antibiotic of Cefazolin prophylaxis dosing was confirmed. The patient was then positioned in the lateral decubitus on the hip pegboard. An axillary roll was placed, and all other pressure points were checked and padded. The hip was then examined and restrictions noted. A relative leg length assessment was carried out and markers were placed for intra-operative assessment. The leg was then prepped and draped in the usual sterile fashion.     A surgical time-out was performed immediately preceding the incision with all personnel in the operating room; the patient identity was again confirmed, the surgical site and extremity were identified and confirmed, X-rays were reviewed, and availability of the appropriate surgical equipment was established.      The left hip was then exposed through a limited skin incision centered over the greater trochanter. Dissection was carried down through skin and subcutaneous tissue to the gluteal fascia. The gluteus sabino was split posteriorly over the trochanter in the direction of its fibers preserving the ITB.  Bleeders were controlled with electrocautery. A direct superior approach to the hip was accomplished, reflecting  the piriformis.  A T-capsulotomy was performed for later repair.  The remainder of the capsule was not disrupted. The labrum was split and the femoral head mobilized. An in situ neck cut was made and the femoral head was removed. Assessment of the femoral head revealed severe eburnation of articular cartilage with complete loss of weight bearing chondral surface and formation of cysts in the head and neck. Marginal osteophytes were present surrounding the femoral neck.     LEMUEL pins were placed superiorly. Array placed. Registration completed. Single reaming with planned cup was completed. The reamers created an excellent hemispherical bed of bleeding cancellous bone.  The cup was inserted with an excellent press fit. The press-fit was firm, stable, and apically seated. The Polyethylene bearing/liner was then impacted into place and checked for stability. LEMUEL pins were removed.      Attention was then turned to the femur. The leg was positioned so access did not result in soft-tissue injury. The medullary cavity of the femur was entered and opened with hand reamers. broaches were then employed in an incremental fashion up to the final size. The final broach was then fully seated, had good rotational and axial stability, and was seated at the appropriate height in relation to the greater trochanter and the template. Trial reduction was done. Excellent stability and range of motion was achieved without impingement at any position. Leg lengths were re-created within millimeters based on the markers and relative measurement. The trials were then dislocated and removed. The wound was copiously irrigated, and the permanent femoral stem was then impacted down in approximately 20 degrees of anteversion. The press-fit was firm, and stable to axial and rotational force in all planes. The permanent femoral head was then impacted on the clean trunion. The socket and wound were irrigated, suctioned, and inspected for debris.  The final reduction was performed, and again the hip was stable in all planes without impingement when stressed to the extremes.      The capsule was repaired. The wound was irrigated. Instrument and sponge count was completed and confirmed correct. The gluteal fascia was closed with interrupted Stratafix suture. Deep subcutaneous tissue was closed with a running #1 Vicryl, and more superficial with interrupted 2-0 Vicryl. A 3-0 Monocryl stitch was used for the skin. Dermabond adhesive was applied. A sterile silver-impregnated dressing was placed. PAS stockings were placed. The patient was then returned to the supine position on the operating room table. After stability was confirmed they were transferred to a hospital bed and taken to Recovery/PACU.       POST-OPERATIVE PLAN:  Patient will be transferred to the floor once in stable condition and after radiographs confirm no immediate complications. The patient will be treated with multimodal pain management protocols. They will be placed on anticoagulation of ASA 81 mg to start on POD1. The patient will be weight bearing as tolerated with posterior hip precautions for 6 weeks. They will work with physical therapy, have a graduated diet, and once medially stable and safe for home can be discharged. Patient will follow up with me 3 weeks post operatively. Dressing should be removed by patient 7 days post operatively.

## 2024-12-04 NOTE — PLAN OF CARE
Problem: Occupational Therapy  Goal: Occupational Therapy Goal  Outcome: Adequate for Care Transition     The patient verbalized understanding of Posterior hIP prec and was able to perform functional mobility with CGA/SBA. The patient was limited by c/o nausea.    The patient states he has DME as needed art home from his previous surgery and his spouse will assist as needed at home.

## 2024-12-04 NOTE — PLAN OF CARE
Discharge Recommendations: Low Intensity and RW  Problem: Physical Therapy  Goal: Physical Therapy Goal  Description: PT Eval and Discharge  Outcome: Met

## 2024-12-04 NOTE — TRANSFER OF CARE
Anesthesia Transfer of Care Note    Patient: Pierre Grossman    Procedure(s) Performed: Procedure(s) (LRB):  ARTHROPLASTY, HIP, TOTAL, USING COMPUTER-ASSISTED NAVIGATION (Left)    Patient location: PACU    Anesthesia Type: general    Transport from OR: Transported from OR on room air with adequate spontaneous ventilation    Post assessment: tolerated procedure well and no apparent anesthetic complications    Post vital signs: stable    Level of consciousness: awake    Nausea/Vomiting: no nausea/vomiting    Complications: none    Transfer of care protocol was followed      Last vitals: Visit Vitals  BP (!) 93/55   Pulse 63   Temp 36.8 °C (98.3 °F)   Resp 18   Wt 94.9 kg (209 lb 4.8 oz)   SpO2 (!) 94%   BMI 30.03 kg/m²

## 2024-12-04 NOTE — OR NURSING
0715 - Time out done for Block per Dr Boyd and Dr Krishnamurthy  Pt on monitors   0720 - Block complete - pt tolerated well

## 2024-12-04 NOTE — PT/OT/SLP EVAL
Physical Therapy Evaluation and Discharge Note    Patient Name:  Pierre Grossman   MRN:  [id number]    Recommendations:     Discharge Recommendations: Low Intensity Therapy  Discharge Equipment Recommendations: walker, rolling   Barriers to discharge: None    Assessment:     Pierre Grossman is a 64 y.o. male admitted with a medical diagnosis of <principal problem not specified>. .  At this time, patient is functioning at their prior level of function and does not require further acute PT services.     BP Vitals taken 2 times in seated position due to c/o dizziness and they remained WNL    Recent Surgery: Procedure(s) (LRB):  ARTHROPLASTY, HIP, TOTAL, USING COMPUTER-ASSISTED NAVIGATION (Left) Day of Surgery    Plan:     During this hospitalization, patient does not require further acute PT services.  Please re-consult if situation changes.      Subjective     Chief Complaint: Pt states that he is dizzy and nauseated. He states that he does not react well to pain medication. Nursing administered Zofran during Tx  Patient/Family Comments/goals: Return home  Pain/Comfort:  Pain Rating 1: 0/10    Patients cultural, spiritual, Mandaeism conflicts given the current situation: no    Living Environment:  Lives at home with wife, no CORINNE  Prior to admission, patients level of function was independent.  Equipment used at home: none.  DME owned (not currently used): none.  Upon discharge, patient will have assistance from wife.    Objective:     Communicated with nursing prior to session.  Patient found HOB elevated with telemetry upon PT entry to room.    General Precautions: Standard, fall    Orthopedic Precautions:LLE weight bearing as tolerated   Braces: N/A  Respiratory Status: Room air    Exams:  Cognitive Exam:  Patient is oriented to Person, Place, Time, and Situation  Gross Motor Coordination:  WFL  Postural Exam:  Patient presented with the following abnormalities:    -       No postural abnormalities identified  RLE  ROM: WNL  RLE Strength: WNL  LLE ROM: WFL except hip  LLE Strength: 3/5    Functional Mobility:  Bed Mobility:     Scooting: supervision  Bridging: modified independence  Supine to Sit: minimum assistance  Sit to Supine: minimum assistance  Transfers:     Sit to Stand:  stand by assistance with rolling walker  Gait: 20 ft, RW, CGA to mod I - did not walk further due to pt c/o nausea and overall ill feeling  Balance: Static Sit and Stand Good     AM-PAC 6 CLICK MOBILITY  Total Score:23       Treatment and Education:  Eval Only    AM-PAC 6 CLICK MOBILITY  Total Score:23     Patient left sitting edge of bed with all lines intact and OT present.    GOALS:   Multidisciplinary Problems       Physical Therapy Goals       Not on file              Multidisciplinary Problems (Resolved)          Problem: Physical Therapy    Goal Priority Disciplines Outcome Interventions   Physical Therapy Goal   (Resolved)     PT, PT/OT Met    Description: PT Eval and Discharge                       History:     Past Medical History:   Diagnosis Date    Essential (primary) hypertension     Head injury 1986    Nephrolithiasis        Past Surgical History:   Procedure Laterality Date    CERVICAL FUSION      COLONOSCOPY N/A 5/30/2018    Procedure: COLONOSCOPY;  Surgeon: Zakia Mirza MD;  Location: [facility];  Service: Endoscopy;  Laterality: N/A;    EYE SURGERY      cataract    HERNIA REPAIR      TOTAL REPLACEMENT OF HIP JOINT USING COMPUTER-ASSISTED NAVIGATION Right 10/3/2018    Procedure: REPLACEMENT-HIP-TOTAL WITH NAVIGATION;  Surgeon: John L. Ochsner Jr., MD;  Location: [address];  Service: Orthopedics;  Laterality: Right;       Time Tracking:     PT Received On:    PT Start Time: 1200     PT Stop Time: 1220  PT Total Time (min): 20 min     Billable Minutes: Evaluation 1      12/04/2024

## 2024-12-04 NOTE — PT/OT/SLP EVAL
"Occupational Therapy   Evaluation and Discharge Note    Name: Pierre Grossman  MRN: [id number]  Admitting Diagnosis: <principal problem not specified>  Recent Surgery: Procedure(s) (LRB):  ARTHROPLASTY, HIP, TOTAL, USING COMPUTER-ASSISTED NAVIGATION (Left) Day of Surgery    Recommendations:     Discharge Recommendations: Low Intensity Therapy  Discharge Equipment Recommendations: walker, rolling  Barriers to discharge:  None    Assessment:     Pierre Grossman is a 64 y.o. male with a medical diagnosis of <principal problem not specified>.   The patient verbalized understanding of Posterior hIP prec and was able to perform functional mobility with CGA/SBA. The patient was limited by c/o nausea.     The patient states he has DME as needed art home from his previous surgery and his spouse will assist as needed at home.  Plan:     During this hospitalization, patient does not require further acute OT services.  Please re-consult if situation changes.    Plan of Care Reviewed with: patient    Subjective     Chief Complaint: some dizziness and nausea  Patient/Family Comments/goals: agreeable to OT    Occupational Profile:  Living Environment: lives with his spouse in a [facility] with a [facility] with a built in bench  Previous level of function: (I) self care and amb, no AD  Roles and Routines: drives  Equipment Used at home: cane, straight  Assistance upon Discharge: spouse    Pain/Comfort:  Pain Rating 1: 0/10 ("slight burning")  Location - Side 1: Left  Location 1: hip    Patients cultural, spiritual, Zoroastrianism conflicts given the current situation: no    Objective:     Communicated with: nurse prior to session.  Patient found HOB elevated with telemetry upon OT entry to room.    General Precautions: Standard, fall  Orthopedic Precautions: LLE posterior precautions, LLE weight bearing as tolerated  Braces: N/A  Respiratory Status: Room air     Occupational Performance:    Bed Mobility:    Patient completed Scooting/Bridging with " stand by assistance  Patient completed Supine to Sit with contact guard assistance  Patient completed Sit to Supine with stand by assistance, contact guard assistance, and with leg lift    Functional Mobility/Transfers:  Patient completed Sit <> Stand Transfer with stand by assistance  with  rolling walker and VC for hand placement on RW and transfer surface   Functional Mobility: The patient amb ~20' with a RW and CGA/SBA. The patient c/o nausea upon return to bed. Nurse was notified and nausea meds were received    Activities of Daily Living:  Upper Body Dressing: contact guard assistance to don back gown  Lower Body Dressing: dependence    Cognitive/Visual Perceptual:  Cognitive/Psychosocial Skills:     -       Oriented to: Person, Place, Time, and Situation   -       Follows Commands/attention:Follows two-step commands  -       Communication: clear/fluent  -       Memory: No Deficits noted  -       Safety awareness/insight to disability: impaired   -       Mood/Affect/Coping skills/emotional control: Appropriate to situation  Visual/Perceptual:      -Intact      Physical Exam:  Balance: -       fair+  Postural examination/scapula alignment:    -       No postural abnormalities identified  Skin integrity: Visible skin intact and left hip incision covered by dressing  Edema:  None noted  Sensation:    -       Intact  Dominant hand: -       right  Upper Extremity Range of Motion:     -       Right Upper Extremity: WNL  -       Left Upper Extremity: WNL  Upper Extremity Strength:    -       Right Upper Extremity: WNL  -       Left Upper Extremity: WNL   Strength:    -       Right Upper Extremity: WNL  -       Left Upper Extremity: WNL    AMPAC 6 Click ADL:  AMPAC Total Score: 21    Treatment & Education:  The patient was educated re: OT role and POC  The patient was educated re: LB dressing s/p MELISSA with use of a hip kit  Educated the patient RE: posterior hip prec R/T car transfer, bed mobility and toilet  transfer. The patient was given handouts for reference.    Patient left HOB elevated with all lines intact and call button in reach    GOALS:   Multidisciplinary Problems       Occupational Therapy Goals          Problem: Occupational Therapy    Goal Priority Disciplines Outcome Interventions   Occupational Therapy Goal     OT, PT/OT Adequate for Care Transition                        History:     Past Medical History:   Diagnosis Date    Essential (primary) hypertension     Head injury 1986    Nephrolithiasis          Past Surgical History:   Procedure Laterality Date    CERVICAL FUSION      COLONOSCOPY N/A 5/30/2018    Procedure: COLONOSCOPY;  Surgeon: Zakia Mirza MD;  Location: [facility];  Service: Endoscopy;  Laterality: N/A;    EYE SURGERY      cataract    HERNIA REPAIR      TOTAL REPLACEMENT OF HIP JOINT USING COMPUTER-ASSISTED NAVIGATION Right 10/3/2018    Procedure: REPLACEMENT-HIP-TOTAL WITH NAVIGATION;  Surgeon: John L. Ochsner Jr., MD;  Location: [address];  Service: Orthopedics;  Laterality: Right;       Time Tracking:     OT Date of Treatment: 12/04/24  OT Start Time: 1211  OT Stop Time: 1228  OT Total Time (min): 17 min    Billable Minutes:Evaluation 8 (with PT)  Self Care/Home Management 9  Total Time 17    12/4/2024

## 2024-12-04 NOTE — ANESTHESIA PROCEDURE NOTES
Peripheral Block    Patient location during procedure: pre-op   Block not for primary anesthetic.  Reason for block: at surgeon's request and post-op pain management   Post-op Pain Location: left hip   Start time: 12/4/2024 7:17 AM  Timeout: 12/4/2024 7:17 AM   End time: 12/4/2024 7:20 AM    Staffing  Authorizing Provider: Marco Boyd MD  Performing Provider: Marco Boyd MD    Staffing  Other anesthesia staff: Semaj Cerda Jr., MD  Performed by: Marco Boyd MD  Authorized by: Marco Boyd MD    Preanesthetic Checklist  Completed: patient identified, IV checked, site marked, risks and benefits discussed, surgical consent, monitors and equipment checked, pre-op evaluation and timeout performed  Peripheral Block  Patient position: supine  Prep: ChloraPrep  Patient monitoring: heart rate, continuous pulse ox and cardiac monitor  Block type: PENG  Laterality: left  Injection technique: single shot  Needle  Needle type: Stimuplex   Needle gauge: 21 G  Needle length: 4 in  Needle localization: ultrasound guidance   -ultrasound image captured on disc.  Assessment  Injection assessment: negative aspiration and negative parasthesia  Paresthesia pain: none  Heart rate change: no  Slow fractionated injection: yes  Pain Tolerance: comfortable throughout block and no complaints  Medications:    Medications: ropivacaine (NAROPIN) injection 0.5% - Perineural   20 mL - 12/4/2024 7:20:00 AM

## 2024-12-04 NOTE — PLAN OF CARE
Orthopedic Discharge Orders: Home Jono         Expected Discharge Date: POD1    Diagnoses:  Post-op hip replacement    Patient is homebound due to:   Pt requires home health services due to taxing effort to leave the home as a result of immobility from Post-op hip replacement    Weight Bearing Status:   full weight bearing    Posterior Hip Precautions for 6 weeks, AVOID:  -Avoid greater than 90 degrees of flexion, internal rotation, and adduction  -Avoid extension, external rotation, and abduction  -Must sleep with hip abduction pillow or regular pillow b/t legs    Physical Therapy   3 times a week for 2 weeks (Call for further orders)  - Ambulate with a rolling walker  - Progress to cane  -Instruct on ROM and strengthening of knee  -Set up for outpt once staples removed and MOD 1 with cane    Wound Care:   If patient is discharged with aqua jacinda/silver dressing, leave on for 7-10 days unless saturated border to border, then follow instructions below:  Cleanse with wound cleanser or normal saline and apply island dressing.  If island dressing not available apply gauze and tegaderm.  Change surgical dressing 3 times a week and PRN  in standing position. Pt may shower if surgical dressing is waterproof. Removal and replacement of dressing after shower only needed if incision is suspected to have gotten wet during shower.  Otherwise change as previously described depending on dressing/drainage. No soaking in the tub or hot tub for 6 weeks.    Wear  TEDS Bilateral Knee High Stockings for 3  Weeks. OK to remove stockings 1-2 hours/day max if desired.    Cold therapy/Ice: encouraged at least 20 minutes 2-3 times daily or more if desired.  Incision must be kept waterproof while icing.      Contact:  Please contact [phone] with questions or concerns    BLOOD THINNER:    If sent home on Lovenox : 28 days post-op for TKR /MELISSA  If sent home home on ASA:    81mg   BID x 4 weeks  If on Plavix, will resume home dosing  regimen and stopping hospital anticoagulants after 5 days post op   Once finished with prescribed blood thinner, patients can return to pre-surgical ASA dosage if they took ASA before surgery.       Outpatient Therapy Preference: [facility] 2 weeks after operation  [address]  [place] [id number]    DME:  - Per PT/OT Recommendation

## 2024-12-04 NOTE — DISCHARGE SUMMARY
"[facility] - Surgery  Discharge Note  Short Stay    Procedure(s) (LRB):  ARTHROPLASTY, HIP, TOTAL, USING COMPUTER-ASSISTED NAVIGATION (Left)      OUTCOME: Patient tolerated treatment/procedure well without complication and is now ready for discharge.    DISPOSITION: Home-Health Care [facility] [place]    FINAL DIAGNOSIS:  left hip oa    FOLLOWUP: In clinic    DISCHARGE INSTRUCTIONS:      Justification of Rolling Walker:  The mobility limitation can not be sufficiently resolved by the use of a cane.  Patient's functional mobility deficit can be sufficiently resolved with the use of a rolling walker.  Patient has mobility limitation significantly impairs their ability to participate in 1 or more activities of daily living.  The use of the rolling walker we will significantly improve the patient's ability to participate in MRADLS and the patient will use it on a regular basis in the home.    Discharge Procedure Orders   WALKER FOR HOME USE     Order Specific Question Answer Comments   Type of Walker: Adult (5'4"-6'6")    With wheels? Yes    Height: 5'10    Weight: 94kg    Length of need (1-99 months): 99    Please check all that apply: Walker will be used for gait training.      Activity as tolerated     Lifting restrictions   Order Comments: No strenuous exercise or lifting of > 10 lbs     Weight bearing as tolerated     No driving, operating heavy equipment or signing legal documents while taking pain medication     Call MD for:  temperature >100.4     Call MD for:  persistent nausea and vomiting     Call MD for:  severe uncontrolled pain     Call MD for:  difficulty breathing, headache or visual disturbances     Call MD for:  redness, tenderness, or signs of infection (pain, swelling, redness, odor or green/yellow discharge around incision site)     Call MD for:  hives     Call MD for:  persistent dizziness or light-headedness     Call MD for:  extreme fatigue        TIME SPENT ON DISCHARGE: 20 minutes  "

## 2024-12-04 NOTE — ANESTHESIA PROCEDURE NOTES
Spinal    Diagnosis: Arthritis of hip  Patient location during procedure: OR    Staffing  Authorizing Provider: Marco Boyd MD  Performing Provider: Marco Boyd MD    Staffing  Performed by: Marco Boyd MD  Authorized by: Marco Boyd MD    Preanesthetic Checklist  Completed: patient identified, IV checked, site marked, risks and benefits discussed, surgical consent, monitors and equipment checked, pre-op evaluation and timeout performed  Spinal Block  Patient position: sitting  Prep: ChloraPrep  Patient monitoring: continuous pulse ox, frequent blood pressure checks and heart rate  Approach: midline  Location: L3-4  Injection technique: single shot  CSF Fluid: clear free-flowing CSF  Needle  Needle type: pencil-tip   Needle gauge: 25 G  Needle length: 3.5 in  Additional Documentation: incremental injection, no paresthesia on injection and negative aspiration for heme  Needle localization: anatomical landmarks  Assessment  Sensory level: T4   Dermatomal levels determined by pinch or prick  Ease of block: easy  Patient's tolerance of the procedure: comfortable throughout block and no complaints

## 2024-12-04 NOTE — ANESTHESIA PROCEDURE NOTES
Spinal    Diagnosis: Hip osteoarthritis  Patient location during procedure: OR  Start time: 12/4/2024 7:30 AM  Timeout: 12/4/2024 7:30 AM  End time: 12/4/2024 7:32 AM    Staffing  Authorizing Provider: Marco Boyd MD  Performing Provider: Marco Boyd MD    Staffing  Performed by: Marco Boyd MD  Authorized by: Marco Boyd MD    Preanesthetic Checklist  Completed: patient identified, IV checked, site marked, risks and benefits discussed, surgical consent, monitors and equipment checked, pre-op evaluation and timeout performed  Spinal Block  Patient position: sitting  Prep: ChloraPrep  Patient monitoring: continuous pulse ox, frequent blood pressure checks and heart rate  Approach: midline  Location: L3-4  Injection technique: single shot  CSF Fluid: clear free-flowing CSF  Needle  Needle type: pencil-tip   Needle gauge: 25 G  Needle length: 3.5 in  Additional Documentation: incremental injection, no paresthesia on injection and negative aspiration for heme  Needle localization: anatomical landmarks  Assessment  Sensory level: T4   Dermatomal levels determined by pinch or prick  Ease of block: easy  Patient's tolerance of the procedure: comfortable throughout block and no complaints  Medications:    Medications: mepivacaine (CARBOCAINE) injection 15 mg/mL (1.5%) - Intrathecal   3 mL - 12/4/2024 7:32:00 AM

## 2024-12-04 NOTE — DISCHARGE INSTRUCTIONS
Post-Operative Discharge Instructions: Dr. Colby    Physical Therapy  You will have home health/physical therapy working with you 2-3x a week for the first two weeks. After this, it will be necessary to begin formal outpatient physical therapy for an additional 2 weeks for hip replacements and about 4-6 weeks for knee replacements.   Knee replacements can get stiff and as such the post operative therapy is critical after a knee replacement. Range of motion exercises are not limited to therapy sessions and should be done every 1-2 hours on your own.   Hip replacements your therapy for the first month is mostly walking and gradually returning to activities as tolerated. However, you will have to wean off assistive devices and maintain hip precautions for 6 weeks post operatively.     Pain Management  Some degree of pain is normal and expected. You will improve gradually as your muscles become stronger and healing continues. This speed of recovery is different for every patient and you should not compare your recovery to another friend or relative.   You will be discharged with pain medications. You should wean off of these as tolerated by 4-6 weeks. but it is expected you will need them in the immediate post operative period and for therapy.   Pain medications can have common side effects of constipation which you should take a laxative, nausea which you should take medication with food, itching which can be alleviated with Benadryl, and confusion which you should stop taking pain medications and call our office if this occurs.   Be aware of your ingestion of Tylenol if your pain medication has this in it, you should not exceed 3000mg of Acetaminophen as this can damage your liver.   If you require a pain medication refill, you will need to contact our office at least 3 days in advance to prescription running out. Prescriptions cannot be called into a pharmacy. You will need to  a prescription in one of our  office locations depending on our clinic availability.     Swelling  You will have swelling of the post operative leg. Swelling may get worse with activity. It will likely get worse in the 2-3 days after discharge from the hospital. Typically swelling is correlated to pain. It can be helpful to elevate your extremity above the level of your heart and consistent use of ice. Elevating your extremity should not be done as to violate your hip precautions after a hip replacement, and no pillows should be placed under the knee after knee replacement surgery.   You will have compression stockings that should remain on for 3 weeks following surgery. They should only be removed for hygiene purposes. These will help with the swelling.       Anticoagulation  You will be placed on a blood thinner to prevent blood clots. You will need to take this as directed.        Wound Care  Your dressing should be changed after 7 days and every few days thereafter. You may shower over this dressing but it should be covered or kept dry (rowdy wrap or garbage bag). We will remove a portion of your bottom glue/gauze dressing at your follow up appointment. You can continue to shower but NO scrubbing the incision, should pat dry. NO soaking the wound in a bathtub, hot tub, pool, ocean etc. for at least 6 weeks after surgery. Your incision will likely be glued on the skin and no sutures should need to be removed post operatively.  You can begin putting on Vitamin E based lotions on the wound around 6-8 weeks post operatively when there is no remaining scabbing of the incision.   If there is any drainage post operatively you should contact our office immediately    Antibiotics  You will need to be placed on prophylactic antibiotics prior to any dental procedure or cleanings, any colonoscopy, cystoscopy, prostate or bladder surgery, kidney surgery or endoscopy. This will avoid risk of subsequent infection of your replacement. We prefer Amoxicillin  2g one hour prior to procedure. This can be given by our office or your dentist. Although controversial, we prefer lifetime dental prophylaxis.     Other Considerations  No additional anti-inflammatories should be used for 3 weeks following surgery  No driving for about 2-4 weeks following surgery on the left leg and about 4-6 weeks after surgery on the right leg. You will need to be off pain medications completely. You will need to be able to perform evasive driving maneuvers without hesitation.    You can fly about 2 weeks post-operatively. However, we may recommend alterative blood thinners if this will take place.   Return to work is patient specific. If a desk job, it may be 2-4 weeks for motivated individuals. Patients in strenuous or physical jobs may be out for 12 weeks.     Follow-up appointments  Your first post operative visit will be about 3 weeks after surgery. You will have x-rays at this appointment  Your second post operative visit will be about 3 months after surgery. This will be for a clinical check only unless a reason arises for an x-ray  Your third post operative visit will be about 1 year after surgery. X-rays are taken at this time.          Fall Prevention  Millions of people fall every year and injure themselves. You may have had anesthesia or sedation which may increase your risk of falling. You may have health issues that put you at an increased risk of falling.     Here are ways to reduce your risk of falling.    Make your home safe by keeping walkways clear of objects you may trip over.  Use non-slip pads under rugs. Do not use area rugs or small throw rugs.  Use non-slip mats in bathtubs and showers.  Install handrails and lights on staircases.  Do not walk in poorly lit areas.  Do not stand on chairs or wobbly ladders.  Use caution when reaching overhead or looking upward. This position can cause a loss of balance.  Be sure your shoes fit properly, have non-slip bottoms and are in  good condition.   Wear shoes both inside and out. Avoid going barefoot or wearing slippers.  Be cautious when going up and down stairs, curbs, and when walking on uneven sidewalks.  If your balance is poor, consider using a cane or walker.  If your fall was related to alcohol use, stop or limit alcohol intake.   If your fall was related to use of sleeping medicines, talk to your doctor about this. You may need to reduce your dosage at bedtime if you awaken during the night to go to the bathroom.    To reduce the need for nighttime bathroom trips:  Avoid drinking fluids for several hours before going to bed  Empty your bladder before going to bed  Men can keep a urinal at the bedside  Stay as active as you can. Balance, flexibility, strength, and endurance all come from exercise. They all play a role in preventing falls. Ask your healthcare provider which types of activity are right for you.  Get your vision checked on a regular basis.  If you have pets, know where they are before you stand up or walk so you don't trip over them.  Use night lights.

## 2024-12-05 DIAGNOSIS — Z96.642 STATUS POST LEFT HIP REPLACEMENT: Primary | ICD-10-CM

## 2024-12-05 PROCEDURE — G0180 MD CERTIFICATION HHA PATIENT: HCPCS | Mod: ,,, | Performed by: ORTHOPAEDIC SURGERY

## 2024-12-06 NOTE — ANESTHESIA POSTPROCEDURE EVALUATION
Anesthesia Post Evaluation    Patient: Pierre Grossman    Procedure(s) Performed: Procedure(s) (LRB):  ARTHROPLASTY, HIP, TOTAL, USING COMPUTER-ASSISTED NAVIGATION (Left)    Final Anesthesia Type: spinal      Patient location during evaluation: PACU  Patient participation: Yes- Able to Participate  Level of consciousness: awake and alert and oriented  Post-procedure vital signs: reviewed and stable  Pain management: adequate  Airway patency: patent    PONV status at discharge: No PONV  Anesthetic complications: no      Cardiovascular status: blood pressure returned to baseline and hemodynamically stable  Respiratory status: unassisted, spontaneous ventilation and room air  Hydration status: euvolemic  Follow-up not needed.              Vitals Value Taken Time   /69 12/04/24 1407   Temp 36.6 °C (97.8 °F) 12/04/24 1407   Pulse 56 12/04/24 1407   Resp 20 12/04/24 1407   SpO2 93 % 12/04/24 1407         Event Time   Out of Recovery 13:00:00         Pain/Mark Score: No data recorded

## 2024-12-12 DIAGNOSIS — Z96.642 STATUS POST LEFT HIP REPLACEMENT: Primary | ICD-10-CM

## 2024-12-24 ENCOUNTER — CLINICAL SUPPORT (OUTPATIENT)
Dept: REHABILITATION | Facility: HOSPITAL | Age: 64
End: 2024-12-24
Payer: COMMERCIAL

## 2024-12-24 DIAGNOSIS — R68.89 DECREASED FUNCTIONAL ACTIVITY TOLERANCE: ICD-10-CM

## 2024-12-24 DIAGNOSIS — R29.898 WEAKNESS OF BOTH LOWER EXTREMITIES: Primary | ICD-10-CM

## 2024-12-24 DIAGNOSIS — Z96.642 STATUS POST LEFT HIP REPLACEMENT: ICD-10-CM

## 2024-12-24 PROCEDURE — 97161 PT EVAL LOW COMPLEX 20 MIN: CPT | Mod: PN

## 2024-12-24 PROCEDURE — 97110 THERAPEUTIC EXERCISES: CPT | Mod: PN

## 2024-12-24 NOTE — PLAN OF CARE
[facility]  Physical Therapy Initial Evaluation    Date: 12/24/2024   Name: Pierre Grossman  Clinic Number: [id number]    Therapy Diagnosis:   Encounter Diagnoses   Name Primary?    Status post left hip replacement     Weakness of both lower extremities Yes    Decreased functional activity tolerance      Physician: Mc Colby MD    Physician Orders: PT Eval and Treat   Medical Diagnosis from Referral: Z96.642 (ICD-10-CM) - Status post left hip replacement   Evaluation Date: 12/24/2024  Authorization Period Expiration: 12/31/24  Plan of Care Expiration: 4/24/25  Visit # / Visits authorized: 1/ 20 Progress Note Due: 1/24/25  FOTO: 1/ 1    Precautions: Standard,  The patient will be weight bearing as tolerated with posterior hip precautions for 6 weeks.   Date of surgery: 12/04/24, 2 weeks and 6 days     Time In: 8:10  Time Out: 8:46  Total Appointment Time (timed & untimed codes): 36 minutes    Subjective   Date of onset: chronic   History of current condition - Pierre reports: that he has a history of progressive L hip pain and arthritis. He states that his L hip pain was severe with activity, which led to his decision to have the hip replaced. He notes that he did home health PT 4 times over the past 2 weeks post-op. He states that he does a lot of walking and went to the mall with his wife on Sunday for a couple of hours but had to sit down a few times due to pain in the L hip. He mentions that standing hip abduction exercises prescribed by PT hurt his groin. He also states that he had his R hip replaced in 2018 and had good results with PT.    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  Prior Level of Function: independent    Current level of function: independent     Pts goals: is to get rid of the pain.     Imaging,: see imaging      Medical History:   Past Medical History:   Diagnosis Date    Essential (primary) hypertension     Head injury 1986    Nephrolithiasis        Surgical History:   Pierre Grossman  has a past surgical history that includes Hernia repair; Eye surgery; Cervical fusion; Colonoscopy (N/A, 5/30/2018); Total replacement of hip joint using computer-assisted navigation (Right, 10/3/2018); and Total replacement of hip joint using computer-assisted navigation (Left, 12/4/2024).    Medications:   Pierre has a current medication list which includes the following prescription(s): acetaminophen, amlodipine, ascorbic acid, aspirin, celecoxib, docusate sodium, fluticasone propionate, levocetirizine, meclizine, oxycodone, pantoprazole, pregabalin, and vitamin b complex.    Allergies:   Review of patient's allergies indicates:   Allergen Reactions    Iodinated contrast media Shortness Of Breath and Swelling    Iodine Itching      Objective     Observation:     Posture Alignment: slouched posture    Sensation: Light touch: intact to light touch    DTR: intact to light touch    GAIT DEVIATIONS: Pierre ambulates with no AD and displays antalgic gait;decreased step length    Hip Range of Motion: Limited L hip ROM secondary to posterior hip precautions and pain. R hip ROM WFL.    Lower Extremity Strength: right LE is 4/5 grossly; Left LE NT due to 2 weeks post op    30 second chair stand: 13 reps with bilateral UE support  30" Chair Stand Cutoff Scores:      6 minute walk test    Flexibility:    Hamstring length 90/90: R = moderate limitation; L = maximal limitation secondary to hip precautions   Raf test: R = moderate " limitation ; L = maximal limitation secondary to hip precautions      CMS Impairment/Limitation/Restriction for FOTO Survey    Therapist reviewed FOTO scores for Pierre Grossman on 12/24/2024.   FOTO documents entered into [employer] - see Media section.    Limitation Score:      TREATMENT   Treatment Time In: 8:10  Treatment Time Out: 8:46  Total Treatment time separate from Evaluation: 10 minutes    Pierre received therapeutic exercises to develop strength, endurance, ROM, flexibility, and posture for 10 minutes including:  Supine hip adduction-ball squeeze  Supine hip abduction-isometric /c belt  Seated LAQ  Standing marches  Supine SLR  Standing hamstring curl    Home Exercises and Patient Education Provided    Education provided:   Patient education on nature of current condition and PHYSICAL THERAPY POC  Written HOME EXERCISE PROGRAM provided, reviewed, patient demo understanding    Written Home Exercises Provided: yes.  Exercises were reviewed and Pierre was able to demonstrate them prior to the end of the session.  Pierre demonstrated good  understanding of the education provided.     See EMR under Patient Instructions for exercises provided 12/24/2024.    Assessment   Pierre is a 64 y.o. male referred to outpatient Physical Therapy with a medical diagnosis of status post left hip replacement. Today, pt is 2 weeks and 6 days post-op. Upon evaluation, patient presents with decreased ROM, muscle strength, and flexibility. Patient also presents with increased pain, stiffness, as well as impaired posture, balance, and gait pattern. PT will focus on addressing the patient's impairments to enhance function and assist in returning the patient to PLOF. The subjective and objective findings are addressed through specific goals outlined in the plan of care.     Pt prognosis is Good.   Pt will benefit from skilled outpatient Physical Therapy to address the deficits stated above and in the chart below, provide pt/family education,  and to maximize pt's level of independence.     Plan of care discussed with patient: Yes  Pt's spiritual, cultural and educational needs considered and patient is agreeable to the plan of care and goals as stated below:     Anticipated Barriers for therapy: none     Medical Necessity is demonstrated by the following  History  Co-morbidities and personal factors that may impact the plan of care [x] LOW: no personal factors / co-morbidities  [] MODERATE: 1-2 personal factors / co-morbidities  [] HIGH: 3+ personal factors / co-morbidities    Moderate / High Support Documentation:   Co-morbidities affecting plan of care:   Past Medical History:   Diagnosis Date    Essential (primary) hypertension     Head injury 1986    Nephrolithiasis      Personal Factors:   no deficits     Examination  Body Structures and Functions, activity limitations and participation restrictions that may impact the plan of care [x] LOW: addressing 1-2 elements  [] MODERATE: 3+ elements  [] HIGH: 4+ elements (please support below)    Moderate / High Support Documentation: L hip pain, decreased L hip ROM, L LE weakness, muscle tightness, decreased activity tolerance     Clinical Presentation [x] LOW: stable  [] MODERATE: Evolving  [] HIGH: Unstable     Decision Making/ Complexity Score: low       GOALS: Short Term Goals:  2-3 weeks  1.Report decreased in pain at worse less than <   / =  4  /10  to increase tolerance for functional activities. On going  2. Pt to improve L hip range of motion by 25% to allow for improved functional mobility to allow for improvement in IADLs. On going  3. Increased L LE  MMT 1/2  to increase tolerance for ADL and work activities.On going  4. Pt to tolerate HEP to improve ROM and independence with ADL's.On going    Long Term Goals:  6-8 weeks  1.Report decreased in pain at worse less than  <   / =  1  /10  to increase tolerance for functional mobility. On going  2.Pt to improve L hip range of motion by 75% to allow  for improved functional mobility to allow for improvement in IADLs. On going  3.Increased L LE MMT 1 grade  to increase tolerance for ADL and work activities.On going  4. Pt will scores report 56% or greater on FOTO hip survey  to demonstrate increase in LE function with every day tasks. On going  5. Pt to be Independent with HEP to improve ROM and independence with ADL's. On going  6. Pt will perform at least 18 sit to stands in 30 seconds without UE support to demonstrate increased functional strength. On going     Plan   Plan of care Certification: 12/24/2024 to 4/24/25.    Outpatient Physical Therapy 2 times weekly for 12 weeks to include the following interventions: Electrical Stimulation , Gait Training, Manual Therapy, Moist Heat/ Ice, Neuromuscular Re-ed, Patient Education, Therapeutic Activities, and Therapeutic Exercise. Abdifatah Corbin, PT, DPT    I CERTIFY THE NEED FOR THESE SERVICES FURNISHED UNDER THIS PLAN OF TREATMENT AND WHILE UNDER MY CARE   Physician's comments:     Physician's Signature: ___________________________________________________

## 2024-12-26 DIAGNOSIS — I10 ESSENTIAL HYPERTENSION: ICD-10-CM

## 2024-12-26 RX ORDER — AMLODIPINE BESYLATE 10 MG/1
10 TABLET ORAL
Qty: 90 TABLET | Refills: 1 | Status: SHIPPED | OUTPATIENT
Start: 2024-12-26

## 2024-12-30 ENCOUNTER — OFFICE VISIT (OUTPATIENT)
Dept: ORTHOPEDICS | Facility: CLINIC | Age: 64
End: 2024-12-30
Payer: COMMERCIAL

## 2024-12-30 VITALS — BODY MASS INDEX: 29.95 KG/M2 | WEIGHT: 209.19 LBS | HEIGHT: 70 IN

## 2024-12-30 DIAGNOSIS — Z96.642 STATUS POST LEFT HIP REPLACEMENT: Primary | ICD-10-CM

## 2024-12-30 PROCEDURE — 99999 PR PBB SHADOW E&M-EST. PATIENT-LVL III: CPT | Mod: PBBFAC,,, | Performed by: ORTHOPAEDIC SURGERY

## 2024-12-30 PROCEDURE — 99024 POSTOP FOLLOW-UP VISIT: CPT | Mod: S$GLB,,, | Performed by: ORTHOPAEDIC SURGERY

## 2024-12-30 PROCEDURE — 1159F MED LIST DOCD IN RCRD: CPT | Mod: CPTII,S$GLB,, | Performed by: ORTHOPAEDIC SURGERY

## 2024-12-30 PROCEDURE — 3044F HG A1C LEVEL LT 7.0%: CPT | Mod: CPTII,S$GLB,, | Performed by: ORTHOPAEDIC SURGERY

## 2024-12-30 NOTE — PROGRESS NOTES
"Ortho Hip Follow Up Note    PCP: Socorro Payton MD   Referring Provider: No referring provider defined for this encounter.     Assessment:  64 y.o. male status post left Total Hip Primary completed on 12/4/24.  Patient is doing well.  He is no longer using assistive devices.  He has just started physical therapy.  He reports some intermittent soreness over the lateral aspect of his hip.  He reports some decreased flexibility which was there preoperatively.    Plan:  Follow up 3 months   Future Radiographs Indicated at next visit: No    Pain Management: Continue current pain management  Anticoagulation: Continue ASA for total of 4 weeks duration post operatively  Wound Care: Ok to shower. No scrubbing incision. No soaking in pools or tubs for 6 weeks post operative.     Patient Reassurance:   Post-operative course discussed with patient. Patient reassured and supported. All questions answered.    ACTIVE PROBLEM LIST  Patient Active Problem List   Diagnosis    DOMINIK (obstructive sleep apnea)    Primary osteoarthritis of right hip    Status post total hip replacement, right 10/3/2018    Nephrolithiasis    Weakness of both lower extremities    Decreased functional activity tolerance         HPI:  Pierre Grossman presents today for a post-op visit.    STATUS POST:  left Total Hip Primary  BMI: Body mass index is 30.02 kg/m².    Post operative recovery was complicated by: None    Patient rates his condition as improving. Pleased with surgical outcome to date.     Functional Assessment:  Started Outpatient PT  Functional Difficulties:  Interferes with sleep and Arising from chair  Pain Medication:  Non-Narcotic  Anticoagulation: Aspirin     EXAM: POST OP HIP    left POST-OPERATIVE HIP    Ht 5' 10" (1.778 m)   Wt 94.9 kg (209 lb 3.5 oz)   BMI 30.02 kg/m²     Skin:  Appropriate post op appearance, No evidence of erythema, warmth, discharge, or drainage, and Incision clean/dry/intact  Range of Motion: Pain " Free  Neurovascular Status: Sensation intact in Sural, Saphenous, SPN, DPN and Tibial nerve distribution. 5 out of 5 strength in hip flexion, knee flexion/extension, ankle plantarflexion/dorsiflexion. 2+ dorsalis pedis    Hip Imaging:  Implants are well fixed. There is no evidence of loosening. No dislocation.   Estimated Anteversion: 25  Estimate Abduction: 38  Estimated LLD: 2mm

## 2025-01-10 ENCOUNTER — CLINICAL SUPPORT (OUTPATIENT)
Dept: REHABILITATION | Facility: HOSPITAL | Age: 65
End: 2025-01-10
Payer: COMMERCIAL

## 2025-01-10 DIAGNOSIS — Z96.642 STATUS POST LEFT HIP REPLACEMENT: Primary | ICD-10-CM

## 2025-01-10 DIAGNOSIS — R29.898 WEAKNESS OF BOTH LOWER EXTREMITIES: ICD-10-CM

## 2025-01-10 DIAGNOSIS — R68.89 DECREASED FUNCTIONAL ACTIVITY TOLERANCE: ICD-10-CM

## 2025-01-10 PROCEDURE — 97110 THERAPEUTIC EXERCISES: CPT | Mod: PN,CQ

## 2025-01-10 PROCEDURE — 97530 THERAPEUTIC ACTIVITIES: CPT | Mod: PN,CQ

## 2025-01-10 NOTE — PROGRESS NOTES
[facility]   Physical Therapy Treatment Note     Name: Pierre Grossman  [facility] Number: [id number]    Therapy Diagnosis:   Encounter Diagnoses   Name Primary?    Weakness of both lower extremities     Decreased functional activity tolerance     Status post left hip replacement Yes     Physician: Mc Colby MD    Visit Date: 1/10/2025    Physician Orders: PT Eval and Treat   Medical Diagnosis from Referral: Z96.642 (ICD-10-CM) - Status post left hip replacement   Evaluation Date: 12/24/2024  Authorization Period Expiration: 12/31/24  Plan of Care Expiration: 4/24/25  Visit # / Visits authorized: 1/ 20 Progress Note Due: 1/24/25  FOTO: 1/ 1     Precautions: Standard,  The patient will be weight bearing as tolerated with posterior hip precautions for 6 weeks.   Date of surgery: left MELISSA on 12/04/24, 5 weeks and 2 days        Visit #: 1 of 20  PTA Visit #: 1  Time In: 14:10 (pt arrives late)   Time Out: 14:50  Total Billable Time: 40 minutes    Subjective     Pt reports: just soreness to the left hip. He did drove to [place] this past weekend with no major problem. He did felt stiff and sore when he gets out of the car, but no pain. He's walking with no assistive device and is pleased with his outcome post-surgery.   He was compliant with home exercise program.  Response to previous treatment: first treatment day   Functional change: ongoing     Pain: 0/10  Location: left hip     Objective     Pierre participated in dynamic functional therapeutic activities to improve functional performance for 8  minutes, including:  Nu-stepper for 8 minutes for lower extremity and upper extremity strength and endurance    Pierre received therapeutic exercises to develop strength, endurance, ROM, and flexibility for 27 minutes including:  HL hip adduction 3x10 with 5 seconds holds   HL hip abduction with RTB, 3x10   Sidelying clamshell with RTB, 3x10    SLR on LLE, 3x10   Bridges x 20     Step ups on 6  Pre-sedation Assessment    History and Physical / Pre-Sedation Assessment  Patient:  Danny Adair   :   1950     Intended Procedure: EGD      HPI: 77 yo w obesity, HTN, Fe def anemia, h/o DU bleed in 2019 at [facility] (repeat EGD by me in 2019 was neg. w neg gastric Bx),, kidney CA, DVT on Couamdin and recent choledocholithiasis s/p ERCP in 2018, presents with melena (black heme-pos diarrhea) and occ bright red blood in his stools associated w INR of 5 and H/H .     Plan:   1. Agree w PPI  2. F/U H/H and transfuse as needed  3. EGD       Past Medical History:   Diagnosis Date    Cancer [facility])     kidney    Edema of both legs     GI bleed     Hx of blood clots     DVT    Hypertension          Procedure Laterality Date    ABDOMEN SURGERY      Right Kidney Cancer abd. surgery     COLONOSCOPY      flexsigmoidoscopy 40yrs ago     ENDOSCOPY, COLON, DIAGNOSTIC      2018    KIDNEY SURGERY  2014    SC EGD TRANSORAL BIOPSY SINGLE/MULTIPLE  2018    EGD BIOPSY performed by Shanti Wheat MD at [place] [address] notes reviewed and agreed.   Medications reviewed  Current Facility-Administered Medications   Medication Dose Route Frequency Provider Last Rate Last Dose    sodium chloride 0.9 % infusion             0.9 % sodium chloride bolus  250 mL Intravenous Once Gauri Schmidt MD 20 mL/hr at 19 0552 250 mL at 19 0552    sodium chloride 0.9 % infusion             mupirocin (BACTROBAN) 2 % ointment   Nasal BID Dillan Thayer MD        pantoprazole (PROTONIX) 80 mg in sodium chloride 0.9 % 100 mL infusion  8 mg/hr Intravenous Continuous Miroslava Booze, APRN - CNP 10 mL/hr at 19 0824 8 mg/hr at  7299    folic acid (FOLVITE) tablet 1 mg  1 mg Oral Daily Mady Peterson MD   1 mg at 19 1013    tamsulosin (FLOMAX) capsule 0.4 mg  0.4 mg Oral Daily Mady Peterson MD   0.4 mg at 19 1013    sodium chloride flush 0.9 % injection 10 mL 10 mL Intravenous 2 times per day Alessandro Voss MD   10 mL at 04/07/19 2107    sodium chloride flush 0.9 % injection 10 mL  10 mL Intravenous PRN Alessandro Voss MD   10 mL at 04/07/19 0134    magnesium hydroxide (MILK OF MAGNESIA) 400 MG/5ML suspension 30 mL  30 mL Oral Daily PRN Alessandro Voss MD        ondansetron (ZOFRAN) injection 4 mg  4 mg Intravenous Q6H PRN Alessandro Voss MD        0.9 % sodium chloride infusion   Intravenous Continuous Alessandro Voss MD   Stopped at 04/07/19 2300       Allergies: No Known Allergies        Physical Exam:  Vital Signs: BP (!) 109/54   Pulse 82   Temp 97.7 °F (36.5 °C) (Oral)   Resp 21   Ht 5' 4\" (1.626 m)   Wt (!) 364 lb (165.1 kg)   SpO2 100%   BMI 62.48 kg/m²  Body mass index is 62.48 kg/m². Airway:Normal  Cardiac:Normal  Pulmonary:Normal  Abdomen:Normal  Specific to procedure: none      Pre-Procedure Assessment/Plan:  ASA 3 - Patient with moderate systemic disease with functional limitations  Malampati class I  Level of Sedation Plan:Mild sedation    Post Procedure plan: Return to same level of care    I assessed the patient and find that the patient is in satisfactory condition to proceed with the planned procedure and sedation plan. I have explained the risk, benefits, and alternatives to the procedure. The patient understands and agrees to proceed.   Yes    Lora Zafar MD       ([phone]) [phone]        Lora Zafar  10:41 AM 4/8/2019 inch while on foam pad, 1 hand on handrail, 2x10   Standing heel raises, 3x10   Standing hip abduction and extension kick, x20 on each side (may add theraband next visit)      *May add in future sessions:  Hip adductor stretch (butterfly stretch)  Standing hip flexors kicks with RTB   Mini squats   Incline wedge gastroc stretch       Pierre participated in neuromuscular re-education activities to improve: Balance, Coordination, Sense, Proprioception, and Posture for 5 minutes. The following activities were included:  Standing marches on foam pad for 1 minutes (no UE support)   Lateral side steps with RTB at ankles, 3 laps (in //bars or in between stairs)          Pierre received the following manual therapy techniques: Joint mobilizations and Soft tissue Mobilization were applied to the: N/A for 00 minutes, including:      Pierre participated in gait training to improve functional mobility and safety for 00 minutes, including:        Home Exercises Provided and Patient Education Provided     Written Home Exercises Provided: yes.  Exercises were reviewed and Pierre was able to demonstrate them prior to the end of the session.  Pierre demonstrated good  understanding of the education provided.     Education provided:   - home exercise program     Assessment   First treatment day after evaluation for left MELISSA since 12/4/2024. Focused on restoring hip ROM as per protocols, strength, and endurance. Patient is ambulating with no assistive device today. Patient has mild soreness to left hip, but no major pain present. Moderate cues for proper techniques in all exercises. Patient appears to be responding well without any complaints. At this time,   Pierre Is progressing well towards his goals.       Pt prognosis is Good.     Pt will continue to benefit from skilled outpatient physical therapy to address the deficits listed in the problem list box on initial evaluation, provide pt/family education and to maximize pt's level of  independence in the home and community environment.     Pt's spiritual, cultural and educational needs considered and pt agreeable to plan of care and goals.     Anticipated barriers to physical therapy: None     Goals:   Short Term Goals:  2-3 weeks  1.Report decreased in pain at worse less than <   / =  4  /10  to increase tolerance for functional activities. On going  2. Pt to improve L hip range of motion by 25% to allow for improved functional mobility to allow for improvement in IADLs. On going  3. Increased L LE  MMT 1/2  to increase tolerance for ADL and work activities.On going  4. Pt to tolerate HEP to improve ROM and independence with ADL's.On going     Long Term Goals:  6-8 weeks  1.Report decreased in pain at worse less than  <   / =  1  /10  to increase tolerance for functional mobility. On going  2.Pt to improve L hip range of motion by 75% to allow for improved functional mobility to allow for improvement in IADLs. On going  3.Increased L LE MMT 1 grade  to increase tolerance for ADL and work activities.On going  4. Pt will scores report 56% or greater on FOTO hip survey  to demonstrate increase in LE function with every day tasks. On going  5. Pt to be Independent with HEP to improve ROM and independence with ADL's. On going  6. Pt will perform at least 18 sit to stands in 30 seconds without UE support to demonstrate increased functional strength. On going   Plan   Plan of care Certification: 12/24/2024 to 4/24/25.     Outpatient Physical Therapy 2 times weekly for 12 weeks to include the following interventions: Electrical Stimulation , Gait Training, Manual Therapy, Moist Heat/ Ice, Neuromuscular Re-ed, Patient Education, Therapeutic Activities, and Therapeutic Exercise. Dry needling.    Continue with plan of care     Fatoumata Sigala PTA   1/10/2025

## 2025-01-14 ENCOUNTER — CLINICAL SUPPORT (OUTPATIENT)
Dept: REHABILITATION | Facility: HOSPITAL | Age: 65
End: 2025-01-14
Payer: COMMERCIAL

## 2025-01-14 DIAGNOSIS — R29.898 WEAKNESS OF BOTH LOWER EXTREMITIES: Primary | ICD-10-CM

## 2025-01-14 DIAGNOSIS — R68.89 DECREASED FUNCTIONAL ACTIVITY TOLERANCE: ICD-10-CM

## 2025-01-14 PROCEDURE — 97110 THERAPEUTIC EXERCISES: CPT | Mod: PN

## 2025-01-14 PROCEDURE — 97112 NEUROMUSCULAR REEDUCATION: CPT | Mod: PN

## 2025-01-14 PROCEDURE — 97530 THERAPEUTIC ACTIVITIES: CPT | Mod: PN

## 2025-01-14 NOTE — PROGRESS NOTES
[facility]   Physical Therapy Treatment Note     Name: Pierre Grossman  Clinic Number: [id number]    Therapy Diagnosis:   Encounter Diagnoses   Name Primary?    Weakness of both lower extremities Yes    Decreased functional activity tolerance      Physician: Mc Colby MD    Visit Date: 1/14/2025    Physician Orders: PT Eval and Treat   Medical Diagnosis from Referral: Z96.642 (ICD-10-CM) - Status post left hip replacement   Evaluation Date: 12/24/2024  Authorization Period Expiration: 12/31/24  Plan of Care Expiration: 4/24/25  Visit # / Visits authorized: 1/ 20 Progress Note Due: 1/24/25  FOTO: 1/ 1     Precautions: Standard,  The patient will be weight bearing as tolerated with posterior hip precautions for 6 weeks.   Date of surgery: left MELISSA on 12/04/24, 5 weeks and 6 days        Visit #: 1 of 20  PTA Visit #: 1  Time In: 8:07  Time Out: 9:00  Total Billable Time: 53 minutes    Subjective     Pt reports: that he is feeling a little stiff this morning but no pain.  He was compliant with home exercise program.  Response to previous treatment: good  Functional change: ongoing     Pain: 0/10  Location: left hip     Objective   Exercises performed: in bold     Pierre participated in dynamic functional therapeutic activities to improve functional performance for 10  minutes, including:  Upright bike lvl 1.5    Pierre received therapeutic exercises to develop strength, endurance, ROM, and flexibility for 33 minutes including:  HL hip adduction 3x10 with 5 seconds holds   HL hip abduction with RTB, 3x10   Sidelying clamshell with RTB, 3x10    SLR on LLE, 3x10   Bridges x 30     Step ups on 6 inch while on foam pad, 1 hand on handrail, 3x10   Standing heel raises, 3x10   Standing hip abduction and extension kick, x20 on each side (may add theraband next visit)  Physioball wall squat 2x10  Incline wedge gastroc stretch 3x30 sec    *May add in future sessions:  Hip adductor stretch  (butterfly stretch)  Standing hip flexors kicks with RTB     Pierre participated in neuromuscular re-education activities to improve: Balance, Coordination, Sense, Proprioception, and Posture for 10 minutes. The following activities were included:  Standing marches on foam pad for 1 minutes (no UE support)   Lateral side steps with RTB at ankles, 3 laps (in //bars or in between stairs)    Monster and reverse monster walks, RTB, 3 laps     Pierre received the following manual therapy techniques: Joint mobilizations and Soft tissue Mobilization were applied to the: N/A for 00 minutes, including:    Pierre participated in gait training to improve functional mobility and safety for 00 minutes, including:    Home Exercises Provided and Patient Education Provided     Written Home Exercises Provided: yes.  Exercises were reviewed and Pierre was able to demonstrate them prior to the end of the session.  Pierre demonstrated good  understanding of the education provided.     Education provided:   - home exercise program     Assessment     second treatment day after evaluation for left MELISSA since 12/4/2024. Pt is now 5 weeks and 6 days post-op. Continued focus on restoring hip ROM as per protocols, strength, and endurance. Patient is ambulating with no assistive device today with noted antalgic gait and decreased stance time on L LE. Moderate cues for proper form and muscle activation during each exercise to prevent compensations. Patient appears to be responding well without any complaints. At this time,   Pierre Is progressing well towards his goals.     Pt prognosis is Good.     Pt will continue to benefit from skilled outpatient physical therapy to address the deficits listed in the problem list box on initial evaluation, provide pt/family education and to maximize pt's level of independence in the home and community environment.     Pt's spiritual, cultural and educational needs considered and pt agreeable to plan of care and  goals.     Anticipated barriers to physical therapy: None     Goals:   Short Term Goals:  2-3 weeks  1.Report decreased in pain at worse less than <   / =  4  /10  to increase tolerance for functional activities. On going  2. Pt to improve L hip range of motion by 25% to allow for improved functional mobility to allow for improvement in IADLs. On going  3. Increased L LE  MMT 1/2  to increase tolerance for ADL and work activities.On going  4. Pt to tolerate HEP to improve ROM and independence with ADL's.On going     Long Term Goals:  6-8 weeks  1.Report decreased in pain at worse less than  <   / =  1  /10  to increase tolerance for functional mobility. On going  2.Pt to improve L hip range of motion by 75% to allow for improved functional mobility to allow for improvement in IADLs. On going  3.Increased L LE MMT 1 grade  to increase tolerance for ADL and work activities.On going  4. Pt will scores report 56% or greater on FOTO hip survey  to demonstrate increase in LE function with every day tasks. On going  5. Pt to be Independent with HEP to improve ROM and independence with ADL's. On going  6. Pt will perform at least 18 sit to stands in 30 seconds without UE support to demonstrate increased functional strength. On going   Plan   Plan of care Certification: 12/24/2024 to 4/24/25.     Outpatient Physical Therapy 2 times weekly for 12 weeks to include the following interventions: Electrical Stimulation , Gait Training, Manual Therapy, Moist Heat/ Ice, Neuromuscular Re-ed, Patient Education, Therapeutic Activities, and Therapeutic Exercise. Dry needling.    Continue with plan of care     Conchita Corbin PT, DPT  1/14/2025

## 2025-01-16 ENCOUNTER — CLINICAL SUPPORT (OUTPATIENT)
Dept: REHABILITATION | Facility: HOSPITAL | Age: 65
End: 2025-01-16
Payer: COMMERCIAL

## 2025-01-16 DIAGNOSIS — R68.89 DECREASED FUNCTIONAL ACTIVITY TOLERANCE: ICD-10-CM

## 2025-01-16 DIAGNOSIS — R29.898 WEAKNESS OF BOTH LOWER EXTREMITIES: Primary | ICD-10-CM

## 2025-01-16 PROCEDURE — 97530 THERAPEUTIC ACTIVITIES: CPT | Mod: PN,CQ

## 2025-01-16 PROCEDURE — 97112 NEUROMUSCULAR REEDUCATION: CPT | Mod: PN,CQ

## 2025-01-16 NOTE — PROGRESS NOTES
"[facility]   Physical Therapy Treatment Note     Name: Pierre Grossman  Clinic Number: [id number]    Therapy Diagnosis:   Encounter Diagnoses   Name Primary?    Weakness of both lower extremities Yes    Decreased functional activity tolerance      Physician: Mc Colby MD    Visit Date: 1/16/2025    Physician Orders: PT Eval and Treat   Medical Diagnosis from Referral: Z96.642 (ICD-10-CM) - Status post left hip replacement   Evaluation Date: 12/24/2024  Authorization Period Expiration: 12/31/24  Plan of Care Expiration: 4/24/25  Visit # / Visits authorized: 3/ 20 Progress Note Due: 1/24/25  FOTO: 1/ 1     Precautions: Standard,  The patient will be weight bearing as tolerated with posterior hip precautions for 6 weeks.   Date of surgery: left MELISSA on 12/04/24, 6 weeks and 1 days        Visit #: 3 of 20  PTA Visit #: 1/5  Time In: 8:00 am  Time Out: 8:55 am  Total Billable Time: 55 minutes    Subjective     Pt reports: cont feeling a little stiff and sore this morning. He also report has been going up and down stairs a lot with no issue (his bedroom is upstair)  He was compliant with home exercise program.  Response to previous treatment: good  Functional change: ongoing     Pain: 3-4/10  Location: left hip     Objective   Exercises performed: in bold     Pierre participated in dynamic functional therapeutic activities to improve functional performance for 25  minutes, including:  Upright bike lvl +2 10 min  Step ups on 6 inch while on foam pad, 1 hand on handrail, 3x10   Standing marches on foam pad for 1 minutes (no UE support)   Lateral side steps with RTB at ankles, 2 laps   Monster and reverse monster walks, RTB, 3 laps     Pierre received therapeutic exercises to develop strength, endurance, ROM, and flexibility for 5 minutes including:    +Hip adductor stretch (butterfly stretch) 5x10" (may increase 4x20" or 3x30" next visit)  Standing heel raises, 3x10   Incline wedge gastroc " "stretch 3x30 sec      Pierre participated in neuromuscular re-education activities to improve: Balance, Coordination, Sense, Proprioception, and Posture for 25 minutes. The following activities were included:  HL hip adduction 3x10 with 5 seconds holds   HL hip abduction with RTB, 3x10   Sidelying clamshell with RTB, 3x10, towel roll between legs to prevent hip from going to Adduction cross midline  SLR on LLE, 3x10   Bridges with RTB around knees x 30   +Hip adductor stretch (butterfly stretch) 5x10" (may increase 4x20" or 3x30" next visit)  Standing hip abduction, extension, and +flexion kick, x20 on each side with +RTB  Physioball wall squat 2x10    Pierre received the following manual therapy techniques: Joint mobilizations and Soft tissue Mobilization were applied to the: N/A for 00 minutes, including:    Pierre participated in gait training to improve functional mobility and safety for 00 minutes, including:    Home Exercises Provided and Patient Education Provided     Written Home Exercises Provided: yes.  Exercises were reviewed and Pierre was able to demonstrate them prior to the end of the session.  Pierre demonstrated good  understanding of the education provided.     Education provided:   - home exercise program     Assessment     Patient is now 6 weeks and 1 day post-op (still maintain precautions well), reporting feeling a little stiff and sore this morning, ambulating with no AD min antalgic gait. He also report going up and down stairs with no issue. Continued focus on restoring hip ROM as per protocols, strength, and endurance. We cont with the same exercises from last visit, we were able to add Butterfly stretch and Hip Flexion with RTB, he tolerated well. Min cues for proper form and muscle activation during each exercise to prevent compensations. Patient appears to be responding well without any complaints. We will cont to progress per patient tolerance.  Pierre Is progressing well towards his goals. "     Pt prognosis is Good.     Pt will continue to benefit from skilled outpatient physical therapy to address the deficits listed in the problem list box on initial evaluation, provide pt/family education and to maximize pt's level of independence in the home and community environment.     Pt's spiritual, cultural and educational needs considered and pt agreeable to plan of care and goals.     Anticipated barriers to physical therapy: None     Goals:   Short Term Goals:  2-3 weeks  1.Report decreased in pain at worse less than <   / =  4  /10  to increase tolerance for functional activities. On going  2. Pt to improve L hip range of motion by 25% to allow for improved functional mobility to allow for improvement in IADLs. On going  3. Increased L LE  MMT 1/2  to increase tolerance for ADL and work activities.On going  4. Pt to tolerate HEP to improve ROM and independence with ADL's.On going     Long Term Goals:  6-8 weeks  1.Report decreased in pain at worse less than  <   / =  1  /10  to increase tolerance for functional mobility. On going  2.Pt to improve L hip range of motion by 75% to allow for improved functional mobility to allow for improvement in IADLs. On going  3.Increased L LE MMT 1 grade  to increase tolerance for ADL and work activities.On going  4. Pt will scores report 56% or greater on FOTO hip survey  to demonstrate increase in LE function with every day tasks. On going  5. Pt to be Independent with HEP to improve ROM and independence with ADL's. On going  6. Pt will perform at least 18 sit to stands in 30 seconds without UE support to demonstrate increased functional strength. On going   Plan   Plan of care Certification: 12/24/2024 to 4/24/25.     Outpatient Physical Therapy 2 times weekly for 12 weeks to include the following interventions: Electrical Stimulation , Gait Training, Manual Therapy, Moist Heat/ Ice, Neuromuscular Re-ed, Patient Education, Therapeutic Activities, and Therapeutic  Exercise. Dry needling.    Continue with plan of care     Sam To, PTA  1/16/2025

## 2025-01-24 ENCOUNTER — DOCUMENTATION ONLY (OUTPATIENT)
Dept: REHABILITATION | Facility: HOSPITAL | Age: 65
End: 2025-01-24
Payer: COMMERCIAL

## 2025-01-24 ENCOUNTER — CLINICAL SUPPORT (OUTPATIENT)
Dept: REHABILITATION | Facility: HOSPITAL | Age: 65
End: 2025-01-24
Payer: COMMERCIAL

## 2025-01-24 DIAGNOSIS — Z96.642 STATUS POST LEFT HIP REPLACEMENT: Primary | ICD-10-CM

## 2025-01-24 DIAGNOSIS — R29.898 WEAKNESS OF BOTH LOWER EXTREMITIES: ICD-10-CM

## 2025-01-24 DIAGNOSIS — R68.89 DECREASED FUNCTIONAL ACTIVITY TOLERANCE: ICD-10-CM

## 2025-01-24 PROCEDURE — 97530 THERAPEUTIC ACTIVITIES: CPT | Mod: PN,CQ

## 2025-01-24 PROCEDURE — 97110 THERAPEUTIC EXERCISES: CPT | Mod: PN,CQ

## 2025-01-24 PROCEDURE — 97112 NEUROMUSCULAR REEDUCATION: CPT | Mod: PN,CQ

## 2025-01-24 NOTE — PROGRESS NOTES
[facility]                                 PT/PTA conferance        PT/PTA met face to face to discuss pt's treatment plan and progress towards established goals. Pt will be seen by a physical therapist minimally every 6th visit or every 30 days.        Fatoumata Sigala PTA  1/24/2025

## 2025-01-24 NOTE — PROGRESS NOTES
[facility]   Physical Therapy Treatment Note     Name: Pierre Grossman  Clinic Number: [id number]    Therapy Diagnosis:   Encounter Diagnoses   Name Primary?    Status post left hip replacement Yes    Decreased functional activity tolerance     Weakness of both lower extremities      Physician: Mc Colby MD    Visit Date: 1/24/2025    Physician Orders: PT Eval and Treat   Medical Diagnosis from Referral: Z96.642 (ICD-10-CM) - Status post left hip replacement   Evaluation Date: 12/24/2024  Authorization Period Expiration: 12/31/24  Plan of Care Expiration: 4/24/25  Visit # / Visits authorized: 4/ 20 Progress Note Due: 1/24/25   FOTO: 1/ 1     Precautions: Standard,  The patient will be weight bearing as tolerated with posterior hip precautions for 6 weeks.   Date of surgery: left MELISSA on 12/04/24, 7 weeks and 2 days        Visit #: 4 of 20  PTA Visit #: 2/5  Time In: 15:11 (pt arrives late)   Time Out: 15:59  Total Billable Time: 48 minutes    Subjective     Pt reports: he still feels sore from getting in and out of the car in the morning. Patient states he's still not fully weight bearing through the left leg just yet. Patient also mentioned that he has been sitting in the trunk for about 12 hours and that really hurts his buttocks and stiffen everything.   He was compliant with home exercise program.  Response to previous treatment: good  Functional change: ongoing     Pain: 3-4/10  Location: left hip     Objective     Exercises performed: in bold     Pierre participated in dynamic functional therapeutic activities to improve functional performance for 20 minutes, including:  Upright bike lvl +2 10 min  Step ups on 6 inch while on foam pad, 10lb kettlebell weight on each hand, 3x10   Standing marches on foam pad, holding 10lb kettlebell weight on each hand, for 1 minutes (no UE support)    Lateral side steps with GTB at ankles, 2 laps   Monster and reverse monster walks, GTB, 2 laps  "    Pierre received therapeutic exercises to develop strength, endurance, ROM, and flexibility for 8 minutes including:  Hip adductor stretch (butterfly stretch), 3x30" holds   Standing heel raises, 3x10   Incline wedge gastroc stretch 3x30 sec      Pierre participated in neuromuscular re-education activities to improve: Balance, Coordination, Sense, Proprioception, and Posture for 20 minutes. The following activities were included:  Bridges + hip adduction with ball squeezes, 2x10   Bent knee fall out on left side only, GTB, 3x10   Sidelying clamshell with GTB, 3x10, towel roll between legs to prevent hip from going to Adduction cross midline  SLR on LLE with 2lb, 3x10   Standing hip abduction, extension, and +flexion kick, x20 on each side with GTB  Physioball wall squat 2x10 - defer due to popping discomforts on right hip (skip in future sessions)      Pierre received the following manual therapy techniques: Joint mobilizations and Soft tissue Mobilization were applied to the: N/A for 00 minutes, including:    Pierre participated in gait training to improve functional mobility and safety for 00 minutes, including:    Home Exercises Provided and Patient Education Provided     Written Home Exercises Provided: yes.  Exercises were reviewed and Pierre was able to demonstrate them prior to the end of the session.  Pierre demonstrated good  understanding of the education provided.     Education provided:   - home exercise program     Assessment     Patient is now 7 weeks and 2 day post-op (still maintain precautions well), reporting feeling a little stiff and sore this morning, ambulating with no AD min antalgic gait. Increase resistance theraband from red to green for hips strengthening. Combined bridges and hip adduction for dual muscles activation. Patient tolerates most exercises fairly well even with holding 10lb kettlebell weights in standing exercises for balance and weight bearing through bilateral legs. Will defer " physioball wall squats due to right hip popping and to prevent further discomforts or aggravation to this side.       Pierre Is progressing well towards his goals.   Pt prognosis is Good.     Pt will continue to benefit from skilled outpatient physical therapy to address the deficits listed in the problem list box on initial evaluation, provide pt/family education and to maximize pt's level of independence in the home and community environment.     Pt's spiritual, cultural and educational needs considered and pt agreeable to plan of care and goals.     Anticipated barriers to physical therapy: None     Goals:   Short Term Goals:  2-3 weeks  1.Report decreased in pain at worse less than <   / =  4  /10  to increase tolerance for functional activities. On going  2. Pt to improve L hip range of motion by 25% to allow for improved functional mobility to allow for improvement in IADLs. On going  3. Increased L LE  MMT 1/2  to increase tolerance for ADL and work activities.On going  4. Pt to tolerate HEP to improve ROM and independence with ADL's.On going     Long Term Goals:  6-8 weeks  1.Report decreased in pain at worse less than  <   / =  1  /10  to increase tolerance for functional mobility. On going  2.Pt to improve L hip range of motion by 75% to allow for improved functional mobility to allow for improvement in IADLs. On going  3.Increased L LE MMT 1 grade  to increase tolerance for ADL and work activities.On going  4. Pt will scores report 56% or greater on FOTO hip survey  to demonstrate increase in LE function with every day tasks. On going  5. Pt to be Independent with HEP to improve ROM and independence with ADL's. On going  6. Pt will perform at least 18 sit to stands in 30 seconds without UE support to demonstrate increased functional strength. On going   Plan   Plan of care Certification: 12/24/2024 to 4/24/25.     Outpatient Physical Therapy 2 times weekly for 12 weeks to include the following  interventions: Electrical Stimulation , Gait Training, Manual Therapy, Moist Heat/ Ice, Neuromuscular Re-ed, Patient Education, Therapeutic Activities, and Therapeutic Exercise. Dry needling.    Continue with plan of care     Fatoumata Sigala, SAMANTHA  1/24/2025

## 2025-01-27 ENCOUNTER — CLINICAL SUPPORT (OUTPATIENT)
Dept: REHABILITATION | Facility: HOSPITAL | Age: 65
End: 2025-01-27
Payer: COMMERCIAL

## 2025-01-27 ENCOUNTER — DOCUMENTATION ONLY (OUTPATIENT)
Dept: REHABILITATION | Facility: HOSPITAL | Age: 65
End: 2025-01-27
Payer: COMMERCIAL

## 2025-01-27 DIAGNOSIS — R68.89 DECREASED FUNCTIONAL ACTIVITY TOLERANCE: ICD-10-CM

## 2025-01-27 DIAGNOSIS — R29.898 WEAKNESS OF BOTH LOWER EXTREMITIES: Primary | ICD-10-CM

## 2025-01-27 PROCEDURE — 97112 NEUROMUSCULAR REEDUCATION: CPT | Mod: PN,CQ

## 2025-01-27 PROCEDURE — 97530 THERAPEUTIC ACTIVITIES: CPT | Mod: PN,CQ

## 2025-01-27 NOTE — PROGRESS NOTES
[facility]                                 PT/PTA conferance        PT/PTA met face to face to discuss pt's treatment plan and progress towards established goals. Pt will be seen by a physical therapist minimally every 6th visit or every 30 days.    Sam To, PTA  1/27/2025

## 2025-01-27 NOTE — PROGRESS NOTES
[facility]   Physical Therapy Treatment Note     Name: Pierre Grossman  Clinic Number: [id number]    Therapy Diagnosis:   Encounter Diagnoses   Name Primary?    Weakness of both lower extremities Yes    Decreased functional activity tolerance      Physician: Mc Colby MD    Visit Date: 1/27/2025    Physician Orders: PT Eval and Treat   Medical Diagnosis from Referral: Z96.642 (ICD-10-CM) - Status post left hip replacement   Evaluation Date: 12/24/2024  Authorization Period Expiration: 12/31/24  Plan of Care Expiration: 4/24/25  Visit # / Visits authorized: 4/ 20 Progress Note Due: 2/24/25   FOTO: 1/ 1     Precautions: Standard,  The patient will be weight bearing as tolerated with posterior hip precautions for 6 weeks.   Date of surgery: left MELISSA on 12/04/24, 7 weeks and 5 days        Visit #: 5 of 20  PTA Visit #: 3/5  Time In: 8:00 am   Time Out: 9:00 am  Total Billable Time: 60 minutes    Subjective     Pt reports: feeling ok today, a little stiff this morning.  He was compliant with home exercise program.  Response to previous treatment: good  Functional change: ongoing     Pain: 3-4/10  Location: left hip     Objective   Lower Extremity Strength  Right LE  Left LE    Knee extension: 5/5 Knee extension: 5/5   Knee flexion: 4+/5 Knee flexion: 4+/5   Hip flexion: 4+/5 Hip flexion: 3+/5   Hip Internal Rotation:  4+/5    Hip Internal Rotation: 3+/5      Hip External Rotation: 4+/5    Hip External Rotation: 3+/5      Hip extension:  4-/5 Hip extension: 3/5   Hip abduction: 4-/5 Hip abduction: 3/5   Ankle dorsiflexion: 5/5 Ankle dorsiflexion: 5/5   Ankle plantarflexion: 5/5 Ankle plantarflexion: 4/5     Exercises performed: in bold     Pierre participated in dynamic functional therapeutic activities to improve functional performance for 38 minutes, including:  Upright bike lvl 2 10 min, seat level 4  Step ups on 6 inch while on foam pad, 10lb kettlebell weight on each hand, 3x10  "  Standing marches on foam pad, holding 10lb kettlebell weight on each hand, for 1 minutes (no UE support)    Lateral side steps with GTB at ankles, 2 laps   Monster and reverse monster walks, GTB, 2 laps   Reassessment with PT    Pierre received therapeutic exercises to develop strength, endurance, ROM, and flexibility for 5 minutes including:  Hip adductor stretch (butterfly stretch), 3x30" holds   Standing heel raises, 3x10   Incline wedge gastroc stretch 3x30 sec      Pierre participated in neuromuscular re-education activities to improve: Balance, Coordination, Sense, Proprioception, and Posture for 17 minutes. The following activities were included:  Bridges + hip adduction with ball squeezes, 2x10   Bent knee fall out on left side only, GTB, 3x10   Sidelying clamshell with GTB, 3x10, towel roll between legs to prevent hip from going to Adduction cross midline  SLR on LLE with 2lb, 3x10   Standing hip abduction, extension, and +flexion kick, x20 on each side with GTB    Pierre received the following manual therapy techniques: Joint mobilizations and Soft tissue Mobilization were applied to the: N/A for 00 minutes, including:    Pierre participated in gait training to improve functional mobility and safety for 00 minutes, including:    Home Exercises Provided and Patient Education Provided     Written Home Exercises Provided: yes.  Exercises were reviewed and Pierre was able to demonstrate them prior to the end of the session.  Pierre demonstrated good  understanding of the education provided.     Education provided:   - home exercise program     Assessment     Patient is now 7 weeks and 5 days post-op (still maintain precautions well), reporting feeling a little stiff and sore this morning, ambulating with no AD min antalgic gait. Continue with the same ex from last visit. Patient tolerates most exercises fairly well. Advised pt to practice weight bearing through LLE for gait training and functional activity, he " verbalized understanding. Will cont to progress per patient tolerance. FOTO survey done for reassessment this visit. Pt was re-assessed by supervising PT this visit. Pt continues to demonstrate decreased L hip ROM and decreased strength, which is contributing to an antalgic gait. Pt, however, continues to tolerate the prescribed interventions without increase in pain to improve his impairments. Pt has met 2/4 STGs and 0/6 LTGs.       Pierre Is progressing well towards his goals.   Pt prognosis is Good.     Pt will continue to benefit from skilled outpatient physical therapy to address the deficits listed in the problem list box on initial evaluation, provide pt/family education and to maximize pt's level of independence in the home and community environment.     Pt's spiritual, cultural and educational needs considered and pt agreeable to plan of care and goals.     Anticipated barriers to physical therapy: None     Goals:   Short Term Goals:  2-3 weeks  1.Report decreased in pain at worse less than <   / =  4  /10  to increase tolerance for functional activities. MET 1/27/25  2. Pt to improve L hip range of motion by 25% to allow for improved functional mobility to allow for improvement in IADLs. On going  3. Increased L LE  MMT 1/2  to increase tolerance for ADL and work activities.On going  4. Pt to tolerate HEP to improve ROM and independence with ADL's.MET 1/27/25     Long Term Goals:  6-8 weeks  1.Report decreased in pain at worse less than  <   / =  1  /10  to increase tolerance for functional mobility. On going  2.Pt to improve L hip range of motion by 75% to allow for improved functional mobility to allow for improvement in IADLs. On going  3.Increased L LE MMT 1 grade  to increase tolerance for ADL and work activities.On going  4. Pt will scores report 56% or greater on FOTO hip survey  to demonstrate increase in LE function with every day tasks. On going  5. Pt to be Independent with HEP to improve ROM  and independence with ADL's. On going  6. Pt will perform at least 18 sit to stands in 30 seconds without UE support to demonstrate increased functional strength. On going   Plan   Plan of care Certification: 12/24/2024 to 4/24/25.     Outpatient Physical Therapy 2 times weekly for 12 weeks to include the following interventions: Electrical Stimulation , Gait Training, Manual Therapy, Moist Heat/ Ice, Neuromuscular Re-ed, Patient Education, Therapeutic Activities, and Therapeutic Exercise. Dry needling.    Continue with plan of care     PT/PTA met face to face to discuss pt's treatment plan and progress towards established goals. Pt will be seen by a physical therapist minimally every 6th visit or every 30 days.    Sam Dudley PTA and Conchita Corbin PT, DPT  1/27/2025

## 2025-01-28 ENCOUNTER — EXTERNAL HOME HEALTH (OUTPATIENT)
Dept: HOME HEALTH SERVICES | Facility: HOSPITAL | Age: 65
End: 2025-01-28
Payer: COMMERCIAL

## 2025-01-29 ENCOUNTER — CLINICAL SUPPORT (OUTPATIENT)
Dept: REHABILITATION | Facility: HOSPITAL | Age: 65
End: 2025-01-29
Payer: COMMERCIAL

## 2025-01-29 DIAGNOSIS — R29.898 WEAKNESS OF BOTH LOWER EXTREMITIES: Primary | ICD-10-CM

## 2025-01-29 DIAGNOSIS — R68.89 DECREASED FUNCTIONAL ACTIVITY TOLERANCE: ICD-10-CM

## 2025-01-29 PROCEDURE — 97530 THERAPEUTIC ACTIVITIES: CPT | Mod: PN

## 2025-01-29 PROCEDURE — 97110 THERAPEUTIC EXERCISES: CPT | Mod: PN

## 2025-01-29 PROCEDURE — 97112 NEUROMUSCULAR REEDUCATION: CPT | Mod: PN

## 2025-01-29 NOTE — PROGRESS NOTES
[facility]   Physical Therapy Treatment Note     Name: Pierre Grossman  Clinic Number: [id number]    Therapy Diagnosis:   Encounter Diagnoses   Name Primary?    Weakness of both lower extremities Yes    Decreased functional activity tolerance      Physician: Mc Colby MD    Visit Date: 1/29/2025    Physician Orders: PT Eval and Treat   Medical Diagnosis from Referral: Z96.642 (ICD-10-CM) - Status post left hip replacement   Evaluation Date: 12/24/2024  Authorization Period Expiration: 12/31/24  Plan of Care Expiration: 4/24/25  Visit # / Visits authorized: 4/ 20 Progress Note Due: 2/24/25   FOTO: 1/ 1     Precautions: Standard,  The patient will be weight bearing as tolerated with posterior hip precautions for 6 weeks.   Date of surgery: left MELISSA on 12/04/24, 8 weeks and 0 days        Visit #: 5 of 20  PTA Visit #: 3/5  Time In: 8:00 am   Time Out: 8:48 am  Total Billable Time: 48 minutes    Subjective     Pt reports: feeling ok today, a little stiff this morning.  He was compliant with home exercise program.  Response to previous treatment: good  Functional change: ongoing     Pain: 3-4/10  Location: left hip     Objective   Lower Extremity Strength  Right LE  Left LE    Knee extension: 5/5 Knee extension: 5/5   Knee flexion: 4+/5 Knee flexion: 4+/5   Hip flexion: 4+/5 Hip flexion: 3+/5   Hip Internal Rotation:  4+/5    Hip Internal Rotation: 3+/5      Hip External Rotation: 4+/5    Hip External Rotation: 3+/5      Hip extension:  4-/5 Hip extension: 3/5   Hip abduction: 4-/5 Hip abduction: 3/5   Ankle dorsiflexion: 5/5 Ankle dorsiflexion: 5/5   Ankle plantarflexion: 5/5 Ankle plantarflexion: 4/5     Exercises performed: in bold     Pierre participated in dynamic functional therapeutic activities to improve functional performance for 20 minutes, including:  Upright bike lvl 2 10 min, seat level 4  Step ups on 6 inch while on foam pad, 10lb kettlebell weight on each hand, 3x10  "  Standing marches on foam pad, holding 10lb kettlebell weight on each hand, for 1 minutes (no UE support)    Lateral side steps with GTB at ankles, 2 laps   Monster and reverse monster walks, GTB, 2 laps     Pierre received therapeutic exercises to develop strength, endurance, ROM, and flexibility for 8 minutes including:  Hip adductor stretch (butterfly stretch), 3x30" holds   Standing heel raises, 3x10   Incline wedge gastroc stretch 3x30 sec      Pierre participated in neuromuscular re-education activities to improve: Balance, Coordination, Sense, Proprioception, and Posture for 20 minutes. The following activities were included:  Bridges + hip adduction with ball squeezes, 2x10   Bent knee fall out on left side only, GTB, 3x10   Sidelying clamshell with GTB, 3x10, towel roll between legs to prevent hip from going to Adduction cross midline  SLR on LLE with 2lb, 3x10   Standing hip abduction, extension, and +flexion kick, x20 on each side with GTB    Pierre received the following manual therapy techniques: Joint mobilizations and Soft tissue Mobilization were applied to the: N/A for 00 minutes, including:    Pierre participated in gait training to improve functional mobility and safety for 00 minutes, including:    Home Exercises Provided and Patient Education Provided     Written Home Exercises Provided: yes.  Exercises were reviewed and Pierre was able to demonstrate them prior to the end of the session.  Pierre demonstrated good  understanding of the education provided.     Education provided:   - home exercise program     Assessment     Patient presents to the clinic with no symptom irritability pre-session and no irritability post-session. Patient is now 8 weeks post-op, reporting feeling good this morning, ambulating with no AD and a slightly antalgic gait.  Patient demonstrates continued decreased L hip ROM and decreased strength, which are likely contributing to the slightly antalgic gait. Pt, however, " continues to tolerate the prescribed interventions without an increase in pain to address his impairments.  Patient demonstrated improvements in gait within the session, ambulating with a less antalgic gait by the end of today's session.    Pierre Is progressing well towards his goals.   Pt prognosis is Good.     Pt will continue to benefit from skilled outpatient physical therapy to address the deficits listed in the problem list box on initial evaluation, provide pt/family education and to maximize pt's level of independence in the home and community environment.     Pt's spiritual, cultural and educational needs considered and pt agreeable to plan of care and goals.     Anticipated barriers to physical therapy: None     Goals:   Short Term Goals:  2-3 weeks  1.Report decreased in pain at worse less than <   / =  4  /10  to increase tolerance for functional activities. MET 1/27/25  2. Pt to improve L hip range of motion by 25% to allow for improved functional mobility to allow for improvement in IADLs. On going  3. Increased L LE  MMT 1/2  to increase tolerance for ADL and work activities.On going  4. Pt to tolerate HEP to improve ROM and independence with ADL's.MET 1/27/25     Long Term Goals:  6-8 weeks  1.Report decreased in pain at worse less than  <   / =  1  /10  to increase tolerance for functional mobility. On going  2.Pt to improve L hip range of motion by 75% to allow for improved functional mobility to allow for improvement in IADLs. On going  3.Increased L LE MMT 1 grade  to increase tolerance for ADL and work activities.On going  4. Pt will scores report 56% or greater on FOTO hip survey  to demonstrate increase in LE function with every day tasks. On going  5. Pt to be Independent with HEP to improve ROM and independence with ADL's. On going  6. Pt will perform at least 18 sit to stands in 30 seconds without UE support to demonstrate increased functional strength. On going   Plan   Plan of care  Certification: 12/24/2024 to 4/24/25.     Outpatient Physical Therapy 2 times weekly for 12 weeks to include the following interventions: Electrical Stimulation , Gait Training, Manual Therapy, Moist Heat/ Ice, Neuromuscular Re-ed, Patient Education, Therapeutic Activities, and Therapeutic Exercise. Dry needling.    Continue with plan of care     Conchita Corbin, PT, DPT  1/29/2025

## 2025-02-03 ENCOUNTER — CLINICAL SUPPORT (OUTPATIENT)
Dept: REHABILITATION | Facility: HOSPITAL | Age: 65
End: 2025-02-03
Payer: COMMERCIAL

## 2025-02-03 DIAGNOSIS — R29.898 WEAKNESS OF BOTH LOWER EXTREMITIES: Primary | ICD-10-CM

## 2025-02-03 DIAGNOSIS — R68.89 DECREASED FUNCTIONAL ACTIVITY TOLERANCE: ICD-10-CM

## 2025-02-03 PROCEDURE — 97112 NEUROMUSCULAR REEDUCATION: CPT | Mod: PN,CQ

## 2025-02-03 PROCEDURE — 97530 THERAPEUTIC ACTIVITIES: CPT | Mod: PN,CQ

## 2025-02-03 NOTE — PROGRESS NOTES
[facility]   Physical Therapy Treatment Note     Name: Pierre Grossman  Clinic Number: [id number]    Therapy Diagnosis:   Encounter Diagnoses   Name Primary?    Weakness of both lower extremities Yes    Decreased functional activity tolerance      Physician: Mc Colby MD    Visit Date: 2/3/2025    Physician Orders: PT Eval and Treat   Medical Diagnosis from Referral: Z96.642 (ICD-10-CM) - Status post left hip replacement   Evaluation Date: 12/24/2024  Authorization Period Expiration: 12/31/24  Plan of Care Expiration: 4/24/25  Visit # / Visits authorized: 4/ 20 Progress Note Due: 2/24/25   FOTO: 1/ 1     Precautions: Standard,  The patient will be weight bearing as tolerated with posterior hip precautions for 6 weeks.   Date of surgery: left MELISSA on 12/04/24, 8 weeks and 0 days        Visit #: 5 of 20  PTA Visit #: 3/5  Time In: 8:00 am   Time Out: 8:48 am  Total Billable Time: 48 minutes    Subjective     Pt reports: feeling ok today, still a little stiff this morning. He says he drove to [place] this weekend and his hip doing a little better not as stiff or painful like before.  He was compliant with home exercise program.  Response to previous treatment: good  Functional change: ongoing     Pain: 3-4/10  Location: left hip     Objective   Lower Extremity Strength  Right LE  Left LE    Knee extension: 5/5 Knee extension: 5/5   Knee flexion: 4+/5 Knee flexion: 4+/5   Hip flexion: 4+/5 Hip flexion: 3+/5   Hip Internal Rotation:  4+/5    Hip Internal Rotation: 3+/5      Hip External Rotation: 4+/5    Hip External Rotation: 3+/5      Hip extension:  4-/5 Hip extension: 3/5   Hip abduction: 4-/5 Hip abduction: 3/5   Ankle dorsiflexion: 5/5 Ankle dorsiflexion: 5/5   Ankle plantarflexion: 5/5 Ankle plantarflexion: 4/5     Exercises performed: in bold     Pierre participated in dynamic functional therapeutic activities to improve functional performance for 38 minutes, including:  Upright  "bike lvl 2 10 min, seat level 4  Step ups on 6 inch while on foam pad, 10lb kettlebell weight on each hand, 3x10   Standing marches on foam pad, holding 10lb kettlebell weight on each hand, for 1 minutes (no UE support)    Lateral side steps with GTB at ankles, 2 laps   Monster and reverse monster walks, GTB, 2 laps   +Shuttle DL 2 Black and 1 Red bands 3x10  +Sit to Stand from 20" box holding 15# KB 2x10  +Matrix Hip Abd 20# 3x10  +Matrix Hip Add 20# 3x10  +Matrix Knee Flex 20# 3x10  +Matrix Knee Ext 15# 3x10    Pierre received therapeutic exercises to develop strength, endurance, ROM, and flexibility for 7 minutes including:  Hip adductor stretch (butterfly stretch), 3x30" holds   Standing heel raises with 2# ankle weight, 3x10   Incline wedge gastroc stretch 3x30 sec  +Hamstring stretch LLE in long sitting 4x30"    Consider to add:  Hip Flexors + Quad stretch     Pierre participated in neuromuscular re-education activities to improve: Balance, Coordination, Sense, Proprioception, and Posture for 10 minutes. The following activities were included:  Bridges + hip adduction with ball squeezes, 2x10   Standing Hip Flex with 2lb, 3x10 each (practice bear weight through LLE)  Glute burner with 2# ankle weight 2x10 each      Home Exercises Provided and Patient Education Provided     Written Home Exercises Provided: yes.  Exercises were reviewed and Pierre was able to demonstrate them prior to the end of the session.  Pierre demonstrated good  understanding of the education provided.     Education provided:   - home exercise program     Assessment     Patient presents to the clinic with reporting feeling ok today, still a little stiff this morning. He says he drove to [place] this weekend and his hip doing a little better not as stiff or painful like before. Patient is now 8 weeks and 5 days post-op, ambulating with no AD and a slightly antalgic gait.  Patient demonstrates continued decreased L hip ROM and decreased " strength, which are likely contributing to the slightly antalgic gait. Added Shuttle DL/SL, Sit to Stand, Matrix Hip Abd/Add, Knee Ext/Flex, and Hamstring stretch this visit. He continues to tolerate the prescribed interventions without an increase in pain to address his impairments. Will cont to progress per patient tolerance.  Pierre Is progressing well towards his goals.   Pt prognosis is Good.     Pt will continue to benefit from skilled outpatient physical therapy to address the deficits listed in the problem list box on initial evaluation, provide pt/family education and to maximize pt's level of independence in the home and community environment.     Pt's spiritual, cultural and educational needs considered and pt agreeable to plan of care and goals.     Anticipated barriers to physical therapy: None     Goals:   Short Term Goals:  2-3 weeks  1.Report decreased in pain at worse less than <   / =  4  /10  to increase tolerance for functional activities. MET 1/27/25  2. Pt to improve L hip range of motion by 25% to allow for improved functional mobility to allow for improvement in IADLs. On going  3. Increased L LE  MMT 1/2  to increase tolerance for ADL and work activities.On going  4. Pt to tolerate HEP to improve ROM and independence with ADL's.MET 1/27/25     Long Term Goals:  6-8 weeks  1.Report decreased in pain at worse less than  <   / =  1  /10  to increase tolerance for functional mobility. On going  2.Pt to improve L hip range of motion by 75% to allow for improved functional mobility to allow for improvement in IADLs. On going  3.Increased L LE MMT 1 grade  to increase tolerance for ADL and work activities.On going  4. Pt will scores report 56% or greater on FOTO hip survey  to demonstrate increase in LE function with every day tasks. On going  5. Pt to be Independent with HEP to improve ROM and independence with ADL's. On going  6. Pt will perform at least 18 sit to stands in 30 seconds without UE  support to demonstrate increased functional strength. On going   Plan   Plan of care Certification: 12/24/2024 to 4/24/25.     Outpatient Physical Therapy 2 times weekly for 12 weeks to include the following interventions: Electrical Stimulation , Gait Training, Manual Therapy, Moist Heat/ Ice, Neuromuscular Re-ed, Patient Education, Therapeutic Activities, and Therapeutic Exercise. Dry needling.    Continue with plan of care     Sam To, PTA  2/3/2025

## 2025-02-05 ENCOUNTER — CLINICAL SUPPORT (OUTPATIENT)
Dept: REHABILITATION | Facility: HOSPITAL | Age: 65
End: 2025-02-05
Payer: COMMERCIAL

## 2025-02-05 DIAGNOSIS — R68.89 DECREASED FUNCTIONAL ACTIVITY TOLERANCE: ICD-10-CM

## 2025-02-05 DIAGNOSIS — R29.898 WEAKNESS OF BOTH LOWER EXTREMITIES: Primary | ICD-10-CM

## 2025-02-05 PROCEDURE — 97110 THERAPEUTIC EXERCISES: CPT | Mod: PN

## 2025-02-05 PROCEDURE — 97112 NEUROMUSCULAR REEDUCATION: CPT | Mod: PN

## 2025-02-05 PROCEDURE — 97530 THERAPEUTIC ACTIVITIES: CPT | Mod: PN

## 2025-02-05 NOTE — PROGRESS NOTES
"[facility]   Physical Therapy Treatment Note     Name: Pierre Grossman  [facility] Number: [id number]    Therapy Diagnosis:   Encounter Diagnoses   Name Primary?    Weakness of both lower extremities Yes    Decreased functional activity tolerance      Physician: Mc Colby MD    Visit Date: 2/5/2025    Physician Orders: PT Eval and Treat   Medical Diagnosis from Referral: Z96.642 (ICD-10-CM) - Status post left hip replacement   Evaluation Date: 12/24/2024  Authorization Period Expiration: 12/31/24  Plan of Care Expiration: 4/24/25  Visit # / Visits authorized: 4/ 20 Progress Note Due: 2/24/25   FOTO: 1/ 1     Precautions: Standard,  The patient will be weight bearing as tolerated with posterior hip precautions for 6 weeks.   Date of surgery: left MELISSA on 12/04/24, 8 weeks and 0 days        Visit #: 5 of 20  PTA Visit #: 3/5  Time In: 8:00 am   Time Out: 8:48 am  Total Billable Time: 48 minutes    Subjective     Pt reports: feeling ok today, still a little stiff this morning.   He was compliant with home exercise program.  Response to previous treatment: good  Functional change: ongoing     Pain: 3-4/10  Location: left hip     Objective     Exercises performed: in bold     Pierre participated in dynamic functional therapeutic activities to improve functional performance for 25 minutes, including:  Upright bike lvl 2 10 min, seat level 4  Step ups on 6 inch while on foam pad, 10lb kettlebell weight on each hand, 3x10   Standing marches on foam pad, holding 10lb kettlebell weight on each hand, for 1 minutes (no UE support)    Lateral side steps with GTB at ankles, 2 laps   Monster and reverse monster walks, GTB, 2 laps   +Shuttle DL 2 Black and 1 Red bands 3x10  +Sit to Stand from 20" box holding 15# KB 2x10  +Matrix Hip Abd 20# 3x10  +Matrix Hip Add 20# 3x10  +Matrix Knee Flex 20# 3x10  +Matrix Knee Ext 15# 3x10    Pierre received therapeutic exercises to develop strength, endurance, ROM, and " "flexibility for 15 minutes including:  Hip adductor stretch (butterfly stretch), 3x30" holds   Standing heel raises with 2# ankle weight, 3x10   Incline wedge gastroc stretch 3x30 sec  +Hamstring stretch LLE in long sitting 4x30"    Consider to add:  Hip Flexors + Quad stretch     Pierre participated in neuromuscular re-education activities to improve: Balance, Coordination, Sense, Proprioception, and Posture for 8 minutes. The following activities were included:  Bridges + hip adduction with ball squeezes, 2x10   Standing Hip Flex with 2lb, 3x10 each (practice bear weight through LLE)  Glute burner with 2# ankle weight 2x10 each      Home Exercises Provided and Patient Education Provided     Written Home Exercises Provided: yes.  Exercises were reviewed and Pierre was able to demonstrate them prior to the end of the session.  Pierre demonstrated good  understanding of the education provided.     Education provided:   - home exercise program     Assessment     Patient presents to the clinic with mild symptom irritability pre-session and mild irritability post-session.   Patient demonstrates continued decreased hip strength which results in slightly decreased tolerance to standing exercises. Patient required intermittent VC and demonstrate but carried over well. Patient did not have any increase in pain during today's session.    Pierre Is progressing well towards his goals.   Pt prognosis is Good.     Pt will continue to benefit from skilled outpatient physical therapy to address the deficits listed in the problem list box on initial evaluation, provide pt/family education and to maximize pt's level of independence in the home and community environment.     Pt's spiritual, cultural and educational needs considered and pt agreeable to plan of care and goals.     Anticipated barriers to physical therapy: None     Goals:   Short Term Goals:  2-3 weeks  1.Report decreased in pain at worse less than <   / =  4  /10  to " increase tolerance for functional activities. MET 1/27/25  2. Pt to improve L hip range of motion by 25% to allow for improved functional mobility to allow for improvement in IADLs. On going  3. Increased L LE  MMT 1/2  to increase tolerance for ADL and work activities.On going  4. Pt to tolerate HEP to improve ROM and independence with ADL's.MET 1/27/25     Long Term Goals:  6-8 weeks  1.Report decreased in pain at worse less than  <   / =  1  /10  to increase tolerance for functional mobility. On going  2.Pt to improve L hip range of motion by 75% to allow for improved functional mobility to allow for improvement in IADLs. On going  3.Increased L LE MMT 1 grade  to increase tolerance for ADL and work activities.On going  4. Pt will scores report 56% or greater on FOTO hip survey  to demonstrate increase in LE function with every day tasks. On going  5. Pt to be Independent with HEP to improve ROM and independence with ADL's. On going  6. Pt will perform at least 18 sit to stands in 30 seconds without UE support to demonstrate increased functional strength. On going   Plan   Plan of care Certification: 12/24/2024 to 4/24/25.     Outpatient Physical Therapy 2 times weekly for 12 weeks to include the following interventions: Electrical Stimulation , Gait Training, Manual Therapy, Moist Heat/ Ice, Neuromuscular Re-ed, Patient Education, Therapeutic Activities, and Therapeutic Exercise. Dry needling.    Continue with plan of care     Conchita Corbin, PT, DPT  2/5/2025

## 2025-02-10 ENCOUNTER — CLINICAL SUPPORT (OUTPATIENT)
Dept: REHABILITATION | Facility: HOSPITAL | Age: 65
End: 2025-02-10
Payer: COMMERCIAL

## 2025-02-10 DIAGNOSIS — R29.898 WEAKNESS OF BOTH LOWER EXTREMITIES: Primary | ICD-10-CM

## 2025-02-10 DIAGNOSIS — R68.89 DECREASED FUNCTIONAL ACTIVITY TOLERANCE: ICD-10-CM

## 2025-02-10 PROCEDURE — 97112 NEUROMUSCULAR REEDUCATION: CPT | Mod: PN,CQ

## 2025-02-10 PROCEDURE — 97530 THERAPEUTIC ACTIVITIES: CPT | Mod: PN,CQ

## 2025-02-10 NOTE — PROGRESS NOTES
"[facility]   Physical Therapy Treatment Note     Name: Pierre Grossman  Clinic Number: [id number]    Therapy Diagnosis:   Encounter Diagnoses   Name Primary?    Weakness of both lower extremities Yes    Decreased functional activity tolerance      Physician: Mc Colby MD    Visit Date: 2/10/2025    Physician Orders: PT Eval and Treat   Medical Diagnosis from Referral: Z96.642 (ICD-10-CM) - Status post left hip replacement   Evaluation Date: 12/24/2024  Authorization Period Expiration: 12/31/24  Plan of Care Expiration: 4/24/25  Visit # / Visits authorized: 9/ 20 Progress Note Due: 2/24/25   FOTO: 1/ 1     Precautions: Standard,  The patient will be weight bearing as tolerated with posterior hip precautions for 6 weeks.   Date of surgery: left MELISSA on 12/04/24, 8 weeks and 0 days        Visit #: 9 of 20  PTA Visit #: 1/5  Time In: 8:00 am   Time Out: 8:55 am  Total Billable Time: 55 minutes    Subjective     Pt reports: feeling ok today, still a little stiff this morning.   He was compliant with home exercise program.  Response to previous treatment: good  Functional change: ongoing     Pain: 3-4/10  Location: left hip     Objective     Exercises performed: in bold     Pierre participated in dynamic functional therapeutic activities to improve functional performance for 38 minutes, including:  Upright bike lvl 2 10 min, seat level 4  +Treadmill speed level 2, 10 min  Fwd Step ups on 8 inch while on foam pad, 10lb kettlebell weight on each hand, 3x10   +Lateral Step ups on 8 inch while on foam pad, 2# ankle  3x10  Standing marches on foam pad, holding 10lb kettlebell weight on each hand, for 1 minutes (no UE support)    Lateral side steps with GTB at ankles, 2 laps   Monster and reverse monster walks, GTB, 2 laps   Shuttle DL 2 Black and 1 Red bands 3x10  +Shuttle SL 1 Black and 1 Red bands 2x10, LLE  Sit to Stand from 20" box holding 15# KB 2x10  Matrix Hip Abd 20# 3x10  Matrix Hip Add " "20# 3x10  Matrix Knee Flex 20# 3x10  Matrix Knee Ext 15# 3x10    Pierre received therapeutic exercises to develop strength, endurance, ROM, and flexibility for 6 minutes including:  Hip adductor stretch (butterfly stretch), 3x30" holds   Standing heel raises with 2# ankle weight, 3x10   Incline wedge gastroc stretch 3x30 sec  Hamstring stretch LLE in long sitting 4x30"    Consider to add:  Hip Flexors + Quad stretch     Pierre participated in neuromuscular re-education activities to improve: Balance, Coordination, Sense, Proprioception, and Posture for 11 minutes. The following activities were included:  Bridges + hip adduction with ball squeezes, 2x10   Standing Hip Flex with 2lb, 3x10 each (practice bear weight through LLE)  Glute burner with 2# ankle weight 2x10 each  +SLS on LLE 3x30" with 1 hand support then progress to without support.      Home Exercises Provided and Patient Education Provided     Written Home Exercises Provided: yes.  Exercises were reviewed and Pierre was able to demonstrate them prior to the end of the session.  Pierre demonstrated good  understanding of the education provided.     Education provided:   - home exercise program     Assessment     Patient presents to the clinic with some stiffness to Left hip. Cont with the same ex from last visit, added Shuttle SL and lateral step ups this visit, he tolerated well. Min cueing for proper tech. Will cont to progress per patient tolerance.    Pierre Is progressing well towards his goals.   Pt prognosis is Good.     Pt will continue to benefit from skilled outpatient physical therapy to address the deficits listed in the problem list box on initial evaluation, provide pt/family education and to maximize pt's level of independence in the home and community environment.     Pt's spiritual, cultural and educational needs considered and pt agreeable to plan of care and goals.     Anticipated barriers to physical therapy: None     Goals:   Short Term " Goals:  2-3 weeks  1.Report decreased in pain at worse less than <   / =  4  /10  to increase tolerance for functional activities. MET 1/27/25  2. Pt to improve L hip range of motion by 25% to allow for improved functional mobility to allow for improvement in IADLs. On going  3. Increased L LE  MMT 1/2  to increase tolerance for ADL and work activities.On going  4. Pt to tolerate HEP to improve ROM and independence with ADL's.MET 1/27/25     Long Term Goals:  6-8 weeks  1.Report decreased in pain at worse less than  <   / =  1  /10  to increase tolerance for functional mobility. On going  2.Pt to improve L hip range of motion by 75% to allow for improved functional mobility to allow for improvement in IADLs. On going  3.Increased L LE MMT 1 grade  to increase tolerance for ADL and work activities.On going  4. Pt will scores report 56% or greater on FOTO hip survey  to demonstrate increase in LE function with every day tasks. On going  5. Pt to be Independent with HEP to improve ROM and independence with ADL's. On going  6. Pt will perform at least 18 sit to stands in 30 seconds without UE support to demonstrate increased functional strength. On going   Plan   Plan of care Certification: 12/24/2024 to 4/24/25.     Outpatient Physical Therapy 2 times weekly for 12 weeks to include the following interventions: Electrical Stimulation , Gait Training, Manual Therapy, Moist Heat/ Ice, Neuromuscular Re-ed, Patient Education, Therapeutic Activities, and Therapeutic Exercise. Dry needling.    Continue with plan of care     Sam To, PTA  2/10/2025

## 2025-02-12 ENCOUNTER — CLINICAL SUPPORT (OUTPATIENT)
Dept: REHABILITATION | Facility: HOSPITAL | Age: 65
End: 2025-02-12
Payer: COMMERCIAL

## 2025-02-12 DIAGNOSIS — R68.89 DECREASED FUNCTIONAL ACTIVITY TOLERANCE: ICD-10-CM

## 2025-02-12 DIAGNOSIS — R29.898 WEAKNESS OF BOTH LOWER EXTREMITIES: Primary | ICD-10-CM

## 2025-02-12 PROCEDURE — 97530 THERAPEUTIC ACTIVITIES: CPT | Mod: PN,CQ

## 2025-02-12 PROCEDURE — 97112 NEUROMUSCULAR REEDUCATION: CPT | Mod: PN,CQ

## 2025-02-12 NOTE — PROGRESS NOTES
"[facility]   Physical Therapy Treatment Note     Name: Pierre Grossman  Clinic Number: [id number]    Therapy Diagnosis:   Encounter Diagnoses   Name Primary?    Weakness of both lower extremities Yes    Decreased functional activity tolerance      Physician: Mc Colby MD    Visit Date: 2/12/2025    Physician Orders: PT Eval and Treat   Medical Diagnosis from Referral: Z96.642 (ICD-10-CM) - Status post left hip replacement   Evaluation Date: 12/24/2024  Authorization Period Expiration: 12/31/24  Plan of Care Expiration: 4/24/25  Visit # / Visits authorized: 10/ 20 Progress Note Due: 2/24/25   FOTO: 1/ 1     Precautions: Standard,  The patient will be weight bearing as tolerated with posterior hip precautions for 6 weeks.   Date of surgery: left MELISSA on 12/04/24, 10 weeks and 0 days        Visit #: 10 of 20  PTA Visit #: 2/5  Time In: 8:00 am   Time Out: 8:55 am  Total Billable Time: 55 minutes    Subjective     Pt reports: feeling sore and tight to his Left buttock. He says he did a lot of walking yesterday.   He was compliant with home exercise program.  Response to previous treatment: good  Functional change: ongoing     Pain: 3-4/10  Location: left hip     Objective     Exercises performed: in bold     Pierre participated in dynamic functional therapeutic activities to improve functional performance for 45 minutes, including:  Upright bike lvl 2 10 min, seat level 4  Treadmill speed level 2.4, +inclined 3.5, 10 min  Fwd Step ups on 8 inch while on foam pad, 10lb kettlebell weight on each hand, 3x10   Lateral Step ups on 8 inch while on foam pad, 2# ankle  3x10  Standing marches on foam pad, holding 10lb kettlebell weight on each hand, for 1 minutes (no UE support)    Lateral side steps with GTB at ankles, 2 laps   Monster and reverse monster walks, GTB, 2 laps   Shuttle DL 2 Black and 1 Red bands 3x10  Shuttle SL 1 Black and 1 Red bands 2x10, LLE  Sit to Stand from 20" box holding " "15# KB 2x10  Matrix Hip Abd 35# 3x10  Matrix Hip Add 40# 3x10  Matrix Knee Flex 40# 3x10  Matrix Knee Ext 15# 3x10 (Check on the weight next visit)    Pierre received therapeutic exercises to develop strength, endurance, ROM, and flexibility for 5 minutes including:  Hip adductor stretch (butterfly stretch), 3x30" holds   Standing heel raises with 2# ankle weight, 3x10   Incline wedge gastroc stretch 3x30 sec  Hamstring stretch LLE in long sitting 4x30"    Consider to add:  Hip Flexors + Quad stretch     Pierre participated in neuromuscular re-education activities to improve: Balance, Coordination, Sense, Proprioception, and Posture for 10 minutes. The following activities were included:  Bridges + hip adduction with ball squeezes, 2x10   Standing Hip Flex with 2lb, 3x10 each (practice bear weight through LLE)  Glute burner with 2# ankle weight 2x10 each  SLS on LLE 3x30" with 1 hand support then progress to without support.      Home Exercises Provided and Patient Education Provided     Written Home Exercises Provided: yes.  Exercises were reviewed and Pierre was able to demonstrate them prior to the end of the session.  Pierre demonstrated good  understanding of the education provided.     Education provided:   - home exercise program     Assessment     Patient presents to the clinic with reporting feeling sore and tight to his Left buttock. He says he did a lot of walking yesterday. Patient is now 10 weeks and 0 days post-op, ambulating with no AD and a slightly antalgic gait. Patient demonstrates improvement in BLE strength during ex today with increasing in weight. Advised pt to cont to work on his stretching ex, he verbalized understanding. Will cont to progress per patient tolerance.    Pierre Is progressing well towards his goals.   Pt prognosis is Good.     Pt will continue to benefit from skilled outpatient physical therapy to address the deficits listed in the problem list box on initial evaluation, provide " pt/family education and to maximize pt's level of independence in the home and community environment.     Pt's spiritual, cultural and educational needs considered and pt agreeable to plan of care and goals.     Anticipated barriers to physical therapy: None     Goals:   Short Term Goals:  2-3 weeks  1.Report decreased in pain at worse less than <   / =  4  /10  to increase tolerance for functional activities. MET 1/27/25  2. Pt to improve L hip range of motion by 25% to allow for improved functional mobility to allow for improvement in IADLs. On going  3. Increased L LE  MMT 1/2  to increase tolerance for ADL and work activities.On going  4. Pt to tolerate HEP to improve ROM and independence with ADL's.MET 1/27/25     Long Term Goals:  6-8 weeks  1.Report decreased in pain at worse less than  <   / =  1  /10  to increase tolerance for functional mobility. On going  2.Pt to improve L hip range of motion by 75% to allow for improved functional mobility to allow for improvement in IADLs. On going  3.Increased L LE MMT 1 grade  to increase tolerance for ADL and work activities.On going  4. Pt will scores report 56% or greater on FOTO hip survey  to demonstrate increase in LE function with every day tasks. On going  5. Pt to be Independent with HEP to improve ROM and independence with ADL's. On going  6. Pt will perform at least 18 sit to stands in 30 seconds without UE support to demonstrate increased functional strength. On going   Plan   Plan of care Certification: 12/24/2024 to 4/24/25.     Outpatient Physical Therapy 2 times weekly for 12 weeks to include the following interventions: Electrical Stimulation , Gait Training, Manual Therapy, Moist Heat/ Ice, Neuromuscular Re-ed, Patient Education, Therapeutic Activities, and Therapeutic Exercise. Dry needling.    Continue with plan of care     Sam To, PTA  2/12/2025

## 2025-02-19 ENCOUNTER — CLINICAL SUPPORT (OUTPATIENT)
Dept: REHABILITATION | Facility: HOSPITAL | Age: 65
End: 2025-02-19
Payer: COMMERCIAL

## 2025-02-19 DIAGNOSIS — R29.898 WEAKNESS OF BOTH LOWER EXTREMITIES: Primary | ICD-10-CM

## 2025-02-19 DIAGNOSIS — R68.89 DECREASED FUNCTIONAL ACTIVITY TOLERANCE: ICD-10-CM

## 2025-02-19 PROCEDURE — 97112 NEUROMUSCULAR REEDUCATION: CPT | Mod: PN

## 2025-02-19 PROCEDURE — 97530 THERAPEUTIC ACTIVITIES: CPT | Mod: PN

## 2025-02-19 NOTE — PROGRESS NOTES
"[facility]   Physical Therapy Treatment Note     Name: Pierre Grossman  Clinic Number: [id number]    Therapy Diagnosis:   Encounter Diagnoses   Name Primary?    Weakness of both lower extremities Yes    Decreased functional activity tolerance      Physician: Mc Colby MD    Visit Date: 2/19/2025    Physician Orders: PT Eval and Treat   Medical Diagnosis from Referral: Z96.642 (ICD-10-CM) - Status post left hip replacement   Evaluation Date: 12/24/2024  Authorization Period Expiration: 12/31/24  Plan of Care Expiration: 4/24/25  Visit # / Visits authorized: 10/ 20 Progress Note Due: 2/24/25   FOTO: 1/ 1     Precautions: Standard,  The patient will be weight bearing as tolerated with posterior hip precautions for 6 weeks.   Date of surgery: left MELISSA on 12/04/24, 10 weeks and 0 days        Visit #: 10 of 20  PTA Visit #: 2/5  Time In: 8:06 am   Time Out: 9:00 am  Total Billable Time: 54 minutes    Subjective     Pt reports: that he feels okay and just feels stiff.  He was compliant with home exercise program.  Response to previous treatment: good  Functional change: ongoing     Pain: 3-4/10  Location: left hip     Objective     Exercises performed: in bold     Pierre participated in dynamic functional therapeutic activities to improve functional performance for 42 minutes, including:  Upright bike lvl 2 10 min, seat level 4  Treadmill speed level 2.4, +inclined 3.5, 10 min  Fwd Step ups on 8 inch while on foam pad, 10lb kettlebell weight on each hand, 3x10   Lateral Step ups on 8 inch while on foam pad, 2# ankle  3x10  Standing marches on foam pad, holding 10lb kettlebell weight on each hand, for 1 minutes (no UE support)    Lateral side steps with GTB at ankles, 2 laps   Monster and reverse monster walks, GTB, 2 laps   Shuttle DL 2 Black and 1 Red bands 3x10  Shuttle SL 1 Black and 1 Red bands 2x10, LLE  Sit to Stand from 20" box holding 15# KB 2x10  Matrix Hip Abd 35# 3x10  Matrix Hip " "Add 40# 3x10  Matrix Knee Flex 40# 3x10  Matrix Knee Ext 15# 3x10 (Check on the weight next visit)    Pierre received therapeutic exercises to develop strength, endurance, ROM, and flexibility for 2 minutes including:  Hip adductor stretch (butterfly stretch), 3x30" holds   Standing heel raises with 2# ankle weight, 3x10   Incline wedge gastroc stretch 3x30 sec  Hamstring stretch LLE in long sitting 4x30"    Consider to add:  Hip Flexors + Quad stretch     Pierre participated in neuromuscular re-education activities to improve: Balance, Coordination, Sense, Proprioception, and Posture for 10 minutes. The following activities were included:  Bridges + hip adduction with ball squeezes, 2x10   Standing Hip Flex with 2lb, 3x10 each (practice bear weight through LLE)  Glute burner with 2# ankle weight 2x10 each  SLS on LLE 3x30" with 1 hand support then progress to without support.      Home Exercises Provided and Patient Education Provided     Written Home Exercises Provided: yes.  Exercises were reviewed and Pierre was able to demonstrate them prior to the end of the session.  Pierre demonstrated good  understanding of the education provided.     Education provided:   - home exercise program     Assessment     Patient demonstrates improvement in BLE strength during ex today with increasing in weight as noted by improved tolerance and less rest breaks. Progressed pt in weight which he tolerated without any increase in pain. Will cont to progress per patient tolerance.    Pierre Is progressing well towards his goals.   Pt prognosis is Good.     Pt will continue to benefit from skilled outpatient physical therapy to address the deficits listed in the problem list box on initial evaluation, provide pt/family education and to maximize pt's level of independence in the home and community environment.     Pt's spiritual, cultural and educational needs considered and pt agreeable to plan of care and goals.     Anticipated barriers " to physical therapy: None     Goals:   Short Term Goals:  2-3 weeks  1.Report decreased in pain at worse less than <   / =  4  /10  to increase tolerance for functional activities. MET 1/27/25  2. Pt to improve L hip range of motion by 25% to allow for improved functional mobility to allow for improvement in IADLs. On going  3. Increased L LE  MMT 1/2  to increase tolerance for ADL and work activities.On going  4. Pt to tolerate HEP to improve ROM and independence with ADL's.MET 1/27/25     Long Term Goals:  6-8 weeks  1.Report decreased in pain at worse less than  <   / =  1  /10  to increase tolerance for functional mobility. On going  2.Pt to improve L hip range of motion by 75% to allow for improved functional mobility to allow for improvement in IADLs. On going  3.Increased L LE MMT 1 grade  to increase tolerance for ADL and work activities.On going  4. Pt will scores report 56% or greater on FOTO hip survey  to demonstrate increase in LE function with every day tasks. On going  5. Pt to be Independent with HEP to improve ROM and independence with ADL's. On going  6. Pt will perform at least 18 sit to stands in 30 seconds without UE support to demonstrate increased functional strength. On going   Plan   Plan of care Certification: 12/24/2024 to 4/24/25.     Outpatient Physical Therapy 2 times weekly for 12 weeks to include the following interventions: Electrical Stimulation , Gait Training, Manual Therapy, Moist Heat/ Ice, Neuromuscular Re-ed, Patient Education, Therapeutic Activities, and Therapeutic Exercise. Dry needling.    Continue with plan of care     Conchita Corbin, PT, DPT  2/19/2025

## 2025-02-24 ENCOUNTER — CLINICAL SUPPORT (OUTPATIENT)
Dept: REHABILITATION | Facility: HOSPITAL | Age: 65
End: 2025-02-24
Payer: COMMERCIAL

## 2025-02-24 DIAGNOSIS — R29.898 WEAKNESS OF BOTH LOWER EXTREMITIES: Primary | ICD-10-CM

## 2025-02-24 DIAGNOSIS — R68.89 DECREASED FUNCTIONAL ACTIVITY TOLERANCE: ICD-10-CM

## 2025-02-24 PROCEDURE — 97112 NEUROMUSCULAR REEDUCATION: CPT | Mod: PN

## 2025-02-24 PROCEDURE — 97530 THERAPEUTIC ACTIVITIES: CPT | Mod: PN

## 2025-02-24 NOTE — PROGRESS NOTES
[facility]   Physical Therapy Treatment Note     Name: Pierre Grossman  Clinic Number: [id number]    Therapy Diagnosis:   Encounter Diagnoses   Name Primary?    Weakness of both lower extremities Yes    Decreased functional activity tolerance      Physician: Mc Colby MD    Visit Date: 2/24/2025    Physician Orders: PT Eval and Treat   Medical Diagnosis from Referral: Z96.642 (ICD-10-CM) - Status post left hip replacement   Evaluation Date: 12/24/2024  Authorization Period Expiration: 12/31/24  Plan of Care Expiration: 4/24/25  Visit # / Visits authorized: 10/ 20 Progress Note Due: 2/24/25   FOTO: 1/ 1     Precautions: Standard,  The patient will be weight bearing as tolerated with posterior hip precautions for 6 weeks.   Date of surgery: left MELISSA on 12/04/24, 11 weeks and 5 days      Visit #: 10 of 20  PTA Visit #: 2/5  Time In: 8:05 am   Time Out: 8:58 am  Total Billable Time: 53 minutes    Subjective     Pt reports: that he did a lot of walking on Saturday because his wife was in a parade. He states that he feels a little achy but it is tolerable (cannot rate it).  He was compliant with home exercise program.  Response to previous treatment: good  Functional change: ongoing     Pain: 0/10  Location: left hip     Objective     Exercises performed: in bold     Pierre participated in dynamic functional therapeutic activities to improve functional performance for 40 minutes, including:  Upright bike lvl 2 10 min, seat level 4  Treadmill speed level 2.4, +inclined 3.5, 10 min  Fwd Step ups on 8 inch while on foam pad, 10lb kettlebell weight on each hand, 3x10   Lateral Step ups on 8 inch while on foam pad, 2# ankle  3x10  Standing marches on foam pad, holding 10lb kettlebell weight on each hand, for 1 minutes (no UE support)    Lateral side steps with GTB at ankles, 2 laps   Monster and reverse monster walks, GTB, 2 laps   Shuttle DL 2 Black and 1 Red bands 3x10  Shuttle SL 1 Black and  "1 Red bands 2x10, LLE  Sit to Stand from 20" box holding +20# KB 2x10  Matrix Hip Abd 35# 3x10  Matrix Hip Add 40# 3x10  Matrix Knee Flex 40# 3x10  Matrix Knee Ext +40# 3x10 (Check on the weight next visit)    Pierre received therapeutic exercises to develop strength, endurance, ROM, and flexibility for 3 minutes including:  Hip adductor stretch (butterfly stretch), 3x30" holds   Standing heel raises with 2# ankle weight, 3x10   Incline wedge gastroc stretch 3x30 sec  Hamstring stretch LLE in long sitting 4x30"    Consider to add:  Hip Flexors + Quad stretch     Pierre participated in neuromuscular re-education activities to improve: Balance, Coordination, Sense, Proprioception, and Posture for 10 minutes. The following activities were included:  Bridges + hip adduction with ball squeezes, 2x10   Standing Hip Flex with 2lb, 3x10 each (practice bear weight through LLE)  Glute burner with 2# ankle weight 2x10 each  SLS on LLE 3x30" with 1 hand support then progress to without support.    Home Exercises Provided and Patient Education Provided     Written Home Exercises Provided: yes.  Exercises were reviewed and Pierre was able to demonstrate them prior to the end of the session.  Pierre demonstrated good  understanding of the education provided.     Education provided:   - home exercise program     Assessment     Patient presents to the clinic with no symptom irritability pre-session and no irritability post-session. Progressed in weights today. Patient demonstrated good tolerance without any provocation of pain. Patient demonstrates improved hip ROM. BLE strength is coming along well as pt demonstrates improved tolerance to the prescribed interventions. Will continue to progress as tolerated.     Pierre Is progressing well towards his goals.   Pt prognosis is Good.     Pt will continue to benefit from skilled outpatient physical therapy to address the deficits listed in the problem list box on initial evaluation, provide " pt/family education and to maximize pt's level of independence in the home and community environment.     Pt's spiritual, cultural and educational needs considered and pt agreeable to plan of care and goals.     Anticipated barriers to physical therapy: None     Goals:   Short Term Goals:  2-3 weeks  1.Report decreased in pain at worse less than <   / =  4  /10  to increase tolerance for functional activities. MET 1/27/25  2. Pt to improve L hip range of motion by 25% to allow for improved functional mobility to allow for improvement in IADLs. On going  3. Increased L LE  MMT 1/2  to increase tolerance for ADL and work activities.On going  4. Pt to tolerate HEP to improve ROM and independence with ADL's.MET 1/27/25     Long Term Goals:  6-8 weeks  1.Report decreased in pain at worse less than  <   / =  1  /10  to increase tolerance for functional mobility. On going  2.Pt to improve L hip range of motion by 75% to allow for improved functional mobility to allow for improvement in IADLs. On going  3.Increased L LE MMT 1 grade  to increase tolerance for ADL and work activities.On going  4. Pt will scores report 56% or greater on FOTO hip survey  to demonstrate increase in LE function with every day tasks. On going  5. Pt to be Independent with HEP to improve ROM and independence with ADL's. On going  6. Pt will perform at least 18 sit to stands in 30 seconds without UE support to demonstrate increased functional strength. On going   Plan   Plan of care Certification: 12/24/2024 to 4/24/25.     Outpatient Physical Therapy 2 times weekly for 12 weeks to include the following interventions: Electrical Stimulation , Gait Training, Manual Therapy, Moist Heat/ Ice, Neuromuscular Re-ed, Patient Education, Therapeutic Activities, and Therapeutic Exercise. Dry needling.    Continue with plan of care     Conchita Corbin, PT, DPT  2/24/2025

## 2025-02-26 ENCOUNTER — CLINICAL SUPPORT (OUTPATIENT)
Dept: REHABILITATION | Facility: HOSPITAL | Age: 65
End: 2025-02-26
Payer: COMMERCIAL

## 2025-02-26 DIAGNOSIS — R29.898 WEAKNESS OF BOTH LOWER EXTREMITIES: Primary | ICD-10-CM

## 2025-02-26 DIAGNOSIS — R68.89 DECREASED FUNCTIONAL ACTIVITY TOLERANCE: ICD-10-CM

## 2025-02-26 PROCEDURE — 97530 THERAPEUTIC ACTIVITIES: CPT | Mod: PN

## 2025-02-26 PROCEDURE — 97110 THERAPEUTIC EXERCISES: CPT | Mod: PN

## 2025-02-26 NOTE — PROGRESS NOTES
"[facility]   Physical Therapy Treatment Note     Name: Pierre Grossman  Clinic Number: [id number]    Therapy Diagnosis:   Encounter Diagnoses   Name Primary?    Weakness of both lower extremities Yes    Decreased functional activity tolerance      Physician: Mc Colby MD    Visit Date: 2/26/2025    Physician Orders: PT Eval and Treat   Medical Diagnosis from Referral: Z96.642 (ICD-10-CM) - Status post left hip replacement   Evaluation Date: 12/24/2024  Authorization Period Expiration: 12/31/24  Plan of Care Expiration: 4/24/25  Visit # / Visits authorized: 10/ 20 Progress Note Due: 3/24/25   FOTO: 1/ 1     Precautions: Standard,  The patient will be weight bearing as tolerated with posterior hip precautions for 6 weeks.   Date of surgery: left MELISSA on 12/04/24, 12 weeks and 0 days      Visit #: 10 of 20  PTA Visit #: 2/5  Time In: 8:02 am   Time Out: 8:42 am  Total Billable Time: 40 minutes    Subjective     Pt reports: that he is doing really good and does not have any pain at all. He states that sometimes he gets a little achy whenever he goes up and down steps but it is tolerable. He states that he is ready to be discharged today.   He was compliant with home exercise program.  Response to previous treatment: good  Functional change: ongoing     Pain: 0/10  Location: left hip     Objective   FOTO    Hip Range of Motion: L hip ROM WFL.     Lower Extremity Strength: right LE is 4/5 grossly; Left LE 4/5 grossly and no pain.     30 second chair stand: 18 reps with bilateral UE support  30" Chair Stand Cutoff Scores:       Exercises performed: in bold     Pierre participated in dynamic functional therapeutic activities to improve functional performance for 23 minutes, including:  Treadmill speed level 2.4, +inclined 3.5, 10 min  Fwd Step ups on 8 inch while on foam pad, 10lb kettlebell weight on each hand, 3x10   Lateral Step ups on 8 inch while on foam pad, 2# ankle  3x10  Standing " "marches on foam pad, holding 10lb kettlebell weight on each hand, for 1 minutes (no UE support)    Lateral side steps with GTB at ankles, 2 laps   Monster and reverse monster walks, GTB, 2 laps   Shuttle DL 2 Black and 1 Red bands 3x10  Shuttle SL 1 Black and 1 Red bands 2x10, LLE  Sit to Stand from 20" box holding +20# KB 2x10  Matrix Hip Abd 40# 3x10  Matrix Hip Add 40# 3x10  Matrix Knee Flex 40# 3x10  Matrix Knee Ext +40# 3x10     Pierre received therapeutic exercises to develop strength, endurance, ROM, and flexibility for 8 minutes including:  Hip adductor stretch (butterfly stretch), 3x30" holds   Standing heel raises with 2# ankle weight, 3x10   Incline wedge gastroc stretch 3x30 sec  Hamstring stretch LLE in long sitting 4x30"    Consider to add:  Hip Flexors + Quad stretch     Pierre participated in neuromuscular re-education activities to improve: Balance, Coordination, Sense, Proprioception, and Posture for 2 minutes. The following activities were included:  Bridges + hip adduction with ball squeezes, 2x10   Standing Hip Flex with 2lb, 3x10 each (practice bear weight through LLE)  Glute burner with 2# ankle weight 2x10 each  SLS on LLE 3x30" with 1 hand support then progress to without support.    Home Exercises Provided and Patient Education Provided     Written Home Exercises Provided: yes.  Exercises were reviewed and Pierre was able to demonstrate them prior to the end of the session.  Pierre demonstrated good  understanding of the education provided.     Education provided:   - home exercise program     Assessment     Patient is 12 weeks post L MELISSA. Patient presents to the clinic with no symptom irritability pre-session and no irritability post-session. Patient was re-assessed today. Patient has met all of his STG and LTGs. Patient demonstrates improvements in hip ROM, strength, and flexibility. Patient demonstrates good tolerance to today's interventions without any provocation of pain. Patient " demonstrates ability to self manage his condition at home with continuation of HEP. Therefore, patient is discharged today.     Pierre Is progressing well towards his goals.   Pt prognosis is Good.     Pt will continue to benefit from skilled outpatient physical therapy to address the deficits listed in the problem list box on initial evaluation, provide pt/family education and to maximize pt's level of independence in the home and community environment.     Pt's spiritual, cultural and educational needs considered and pt agreeable to plan of care and goals.     Anticipated barriers to physical therapy: None     Goals:   Short Term Goals:  2-3 weeks  1.Report decreased in pain at worse less than <   / =  4  /10  to increase tolerance for functional activities. MET 1/27/25  2. Pt to improve L hip range of motion by 25% to allow for improved functional mobility to allow for improvement in IADLs. MET 2/26/25  3. Increased L LE  MMT 1/2  to increase tolerance for ADL and work activities.MET 2/26/25  4. Pt to tolerate HEP to improve ROM and independence with ADL's.MET 1/27/25     Long Term Goals:  6-8 weeks  1.Report decreased in pain at worse less than  <   / =  1  /10  to increase tolerance for functional mobility. MET 2/26/25  2.Pt to improve L hip range of motion by 75% to allow for improved functional mobility to allow for improvement in IADLs. MET 2/26/25  3.Increased L LE MMT 1 grade  to increase tolerance for ADL and work activities.MET 2/26/25  4. Pt will scores report 56% or greater on FOTO hip survey  to demonstrate increase in LE function with every day tasks. MET 2/26/25  5. Pt to be Independent with HEP to improve ROM and independence with ADL's. MET 2/26/25  6. Pt will perform at least 18 sit to stands in 30 seconds without UE support to demonstrate increased functional strength. MET 2/26/25  Plan     Patient is discharged.     Conchita Corbin, PT, DPT  2/26/2025

## 2025-04-01 ENCOUNTER — OFFICE VISIT (OUTPATIENT)
Dept: ORTHOPEDICS | Facility: CLINIC | Age: 65
End: 2025-04-01
Payer: COMMERCIAL

## 2025-04-01 VITALS — WEIGHT: 209.19 LBS | HEIGHT: 70 IN | BODY MASS INDEX: 29.95 KG/M2

## 2025-04-01 DIAGNOSIS — Z96.641 STATUS POST TOTAL HIP REPLACEMENT, RIGHT: ICD-10-CM

## 2025-04-01 DIAGNOSIS — Z96.642 STATUS POST LEFT HIP REPLACEMENT: Primary | ICD-10-CM

## 2025-04-01 PROCEDURE — 99999 PR PBB SHADOW E&M-EST. PATIENT-LVL III: CPT | Mod: PBBFAC,,,

## 2025-04-01 NOTE — PROGRESS NOTES
"Ortho Hip Follow Up Note    PCP: Socorro Payton MD   Referring Provider: No referring provider defined for this encounter.     Assessment:  3 month post-operative visit PA: 65 y.o. male status post left Total Hip Primary completed on 12/4/24.  He is doing really well at this point.  He has completed outpatient physical therapy.  No longer needing to take any over-the-counter medications for pain control.  He is still having some residual pain laterally, but this has improved since previous visit.  Overall he reports no functional difficulties.  He has been getting back into exercising with stationary bike and walking.  He he has no acute complaints today.    Plan:  Follow up 1 year postoperatively with Dr. Colby  Future Radiographs Indicated at next visit:  Yes    Pain Management:  Over-the-counter as needed  Anticoagulation:  None  Wound Care:  None    Patient Reassurance:   Post-operative course discussed with patient. Patient reassured and supported. All questions answered.    ACTIVE PROBLEM LIST  Patient Active Problem List   Diagnosis    DOMINIK (obstructive sleep apnea)    Primary osteoarthritis of right hip    Status post total hip replacement, right 10/3/2018    Nephrolithiasis    Weakness of both lower extremities    Decreased functional activity tolerance         HPI:  Pierre Grossman presents today for a post-op visit.    STATUS POST:  left Total Hip Primary  BMI: Body mass index is 30.02 kg/m².    Post operative recovery was complicated by: None    Patient rates his condition as improving. Pleased with surgical outcome to date.     Functional Assessment:  Completed outpatient physical therapy  Functional Difficulties:  None  Pain Medication:  Over-the-counter as needed  Anticoagulation:  None    EXAM: POST OP HIP    left POST-OPERATIVE HIP    Ht 5' 10" (1.778 m)   Wt 94.9 kg (209 lb 3.5 oz)   BMI 30.02 kg/m²     Skin:  Incision appears well healed.  No evidence of erythema, fluctuance.  Range of " Motion: Pain Free  Neurovascular Status: Sensation intact in Sural, Saphenous, SPN, DPN and Tibial nerve distribution. 5 out of 5 strength in hip flexion, knee flexion/extension, ankle plantarflexion/dorsiflexion. 2+ dorsalis pedis    Hip Imaging:  Implants are well fixed. There is no evidence of loosening. No dislocation.   Estimated Anteversion: 25  Estimate Abduction: 38  Estimated LLD: 2mm

## 2025-06-21 DIAGNOSIS — I10 ESSENTIAL HYPERTENSION: ICD-10-CM

## 2025-06-23 RX ORDER — AMLODIPINE BESYLATE 10 MG/1
10 TABLET ORAL
Qty: 90 TABLET | Refills: 1 | Status: SHIPPED | OUTPATIENT
Start: 2025-06-23

## 2025-06-23 NOTE — TELEPHONE ENCOUNTER
[facility] appointment reminder sent to patient    Last Office Visit Info:   The patient's last visit with Socorro Payton MD was on 09/04/2024 Jennifer Ledezma     The patient's last visit in current department was on Visit date not found.        Last CBC Results:   Lab Results   Component Value Date    WBC 9.46 11/20/2024    HGB 15.5 11/20/2024    HCT 47.9 11/20/2024     11/20/2024       Last CMP Results  Lab Results   Component Value Date     11/20/2024    K 4.8 11/20/2024     11/20/2024    CO2 22 (L) 11/20/2024    BUN 18 11/20/2024    CREATININE 1.2 11/20/2024    CALCIUM 10.0 11/20/2024    ALBUMIN 3.8 11/20/2024    AST 23 11/20/2024    ALT 31 11/20/2024       Last Lipids  Lab Results   Component Value Date    CHOL 220 (H) 09/04/2024    TRIG 96 09/04/2024    HDL 44 09/04/2024    LDLCALC 156.8 09/04/2024       Last A1C  Lab Results   Component Value Date    HGBA1C 4.9 09/04/2024       Last TSH  Lab Results   Component Value Date    TSH 1.690 09/04/2024             Current Med Refills  Medication List with Changes/Refills   Current Medications    ACETAMINOPHEN (TYLENOL) 500 MG TABLET    Take 2 tablets (1,000 mg total) by mouth every 8 (eight) hours as needed for Pain.       Start Date: 12/4/2024 End Date: --    AMLODIPINE (NORVASC) 10 MG TABLET    TAKE 1 TABLET BY MOUTH EVERY DAY       Start Date: 12/26/2024End Date: --    ASCORBIC ACID (VITAMIN C ORAL)    Take by mouth.       Start Date: --        End Date: --    ASPIRIN (ECOTRIN) 81 MG EC TABLET    Take 1 tablet (81 mg total) by mouth 2 (two) times a day.       Start Date: 12/4/2024 End Date: 1/3/2025    CELECOXIB (CELEBREX) 200 MG CAPSULE    Take 1 capsule (200 mg total) by mouth 2 (two) times daily.       Start Date: 12/4/2024 End Date: --    DOCUSATE SODIUM (COLACE) 100 MG CAPSULE    Take 1 capsule (100 mg total) by mouth 2 (two) times daily.       Start Date: 12/4/2024 End Date: --    FLUTICASONE PROPIONATE (FLONASE) 50 MCG/ACTUATION  NASAL SPRAY    1 spray (50 mcg total) by Each Nostril route once daily.       Start Date: 9/4/2024  End Date: --    LEVOCETIRIZINE (XYZAL) 5 MG TABLET    Take 1 tablet (5 mg total) by mouth every evening.       Start Date: 9/4/2024  End Date: 9/4/2025    MECLIZINE (ANTIVERT) 25 MG TABLET    Take 1 tablet (25 mg total) by mouth 3 (three) times daily as needed for Dizziness.       Start Date: 9/4/2024  End Date: --    OXYCODONE (ROXICODONE) 5 MG IMMEDIATE RELEASE TABLET    Take 1-2 tablets (5-10 mg total) by mouth every 4 (four) hours as needed (pain).       Start Date: 12/4/2024 End Date: --    PANTOPRAZOLE (PROTONIX) 40 MG TABLET    Take 1 tablet (40 mg total) by mouth once daily.       Start Date: 9/4/2024  End Date: --    PREGABALIN (LYRICA) 75 MG CAPSULE    Take 1 capsule (75 mg total) by mouth 2 (two) times daily. for 14 days       Start Date: 12/4/2024 End Date: 12/18/2024    VITAMIN B COMPLEX ORAL    Take by mouth.       Start Date: --        End Date: --       Order(s) placed per written order guidelines:     Please advise.

## 2025-07-09 ENCOUNTER — LAB VISIT (OUTPATIENT)
Dept: LAB | Facility: HOSPITAL | Age: 65
End: 2025-07-09
Attending: FAMILY MEDICINE
Payer: COMMERCIAL

## 2025-07-09 ENCOUNTER — OFFICE VISIT (OUTPATIENT)
Dept: FAMILY MEDICINE | Facility: CLINIC | Age: 65
End: 2025-07-09
Payer: COMMERCIAL

## 2025-07-09 VITALS
WEIGHT: 207.88 LBS | OXYGEN SATURATION: 96 % | SYSTOLIC BLOOD PRESSURE: 132 MMHG | DIASTOLIC BLOOD PRESSURE: 76 MMHG | HEIGHT: 70 IN | BODY MASS INDEX: 29.76 KG/M2 | TEMPERATURE: 98 F | HEART RATE: 69 BPM

## 2025-07-09 DIAGNOSIS — N52.9 ERECTILE DYSFUNCTION, UNSPECIFIED ERECTILE DYSFUNCTION TYPE: Primary | ICD-10-CM

## 2025-07-09 DIAGNOSIS — I10 ESSENTIAL HYPERTENSION: ICD-10-CM

## 2025-07-09 DIAGNOSIS — K21.9 GASTROESOPHAGEAL REFLUX DISEASE WITHOUT ESOPHAGITIS: ICD-10-CM

## 2025-07-09 DIAGNOSIS — R60.9 EDEMA, UNSPECIFIED TYPE: ICD-10-CM

## 2025-07-09 DIAGNOSIS — R53.83 FATIGUE, UNSPECIFIED TYPE: ICD-10-CM

## 2025-07-09 DIAGNOSIS — L30.9 DERMATITIS: ICD-10-CM

## 2025-07-09 DIAGNOSIS — N52.9 ERECTILE DYSFUNCTION, UNSPECIFIED ERECTILE DYSFUNCTION TYPE: ICD-10-CM

## 2025-07-09 DIAGNOSIS — N20.0 KIDNEY STONE: ICD-10-CM

## 2025-07-09 LAB
ABSOLUTE EOSINOPHIL (OHS): 0.53 K/UL
ABSOLUTE MONOCYTE (OHS): 1.2 K/UL (ref 0.3–1)
ABSOLUTE NEUTROPHIL COUNT (OHS): 7.33 K/UL (ref 1.8–7.7)
ALBUMIN SERPL BCP-MCNC: 3.4 G/DL (ref 3.5–5.2)
ALP SERPL-CCNC: 107 UNIT/L (ref 40–150)
ALT SERPL W/O P-5'-P-CCNC: 33 UNIT/L (ref 10–44)
ANION GAP (OHS): 8 MMOL/L (ref 8–16)
AST SERPL-CCNC: 23 UNIT/L (ref 11–45)
BASOPHILS # BLD AUTO: 0.1 K/UL
BASOPHILS NFR BLD AUTO: 0.9 %
BILIRUB SERPL-MCNC: 0.8 MG/DL (ref 0.1–1)
BUN SERPL-MCNC: 19 MG/DL (ref 8–23)
CALCIUM SERPL-MCNC: 9.2 MG/DL (ref 8.7–10.5)
CHLORIDE SERPL-SCNC: 105 MMOL/L (ref 95–110)
CHOLEST SERPL-MCNC: 210 MG/DL (ref 120–199)
CHOLEST/HDLC SERPL: 4.1 {RATIO} (ref 2–5)
CO2 SERPL-SCNC: 24 MMOL/L (ref 23–29)
CREAT SERPL-MCNC: 2.2 MG/DL (ref 0.5–1.4)
EAG (OHS): 100 MG/DL (ref 68–131)
ERYTHROCYTE [DISTWIDTH] IN BLOOD BY AUTOMATED COUNT: 13.3 % (ref 11.5–14.5)
GFR SERPLBLD CREATININE-BSD FMLA CKD-EPI: 32 ML/MIN/1.73/M2
GLUCOSE SERPL-MCNC: 99 MG/DL (ref 70–110)
HBA1C MFR BLD: 5.1 % (ref 4–5.6)
HCT VFR BLD AUTO: 44.3 % (ref 40–54)
HDLC SERPL-MCNC: 51 MG/DL (ref 40–75)
HDLC SERPL: 24.3 % (ref 20–50)
HGB BLD-MCNC: 14.3 GM/DL (ref 14–18)
IMM GRANULOCYTES # BLD AUTO: 0.06 K/UL (ref 0–0.04)
IMM GRANULOCYTES NFR BLD AUTO: 0.6 % (ref 0–0.5)
LDLC SERPL CALC-MCNC: 148 MG/DL (ref 63–159)
LYMPHOCYTES # BLD AUTO: 1.49 K/UL (ref 1–4.8)
MCH RBC QN AUTO: 27.8 PG (ref 27–31)
MCHC RBC AUTO-ENTMCNC: 32.3 G/DL (ref 32–36)
MCV RBC AUTO: 86 FL (ref 82–98)
NONHDLC SERPL-MCNC: 159 MG/DL
NUCLEATED RBC (/100WBC) (OHS): 0 /100 WBC
PLATELET # BLD AUTO: 250 K/UL (ref 150–450)
PMV BLD AUTO: 10.7 FL (ref 9.2–12.9)
POTASSIUM SERPL-SCNC: 4.8 MMOL/L (ref 3.5–5.1)
PROT SERPL-MCNC: 8 GM/DL (ref 6–8.4)
PSA SERPL-MCNC: 3.47 NG/ML
RBC # BLD AUTO: 5.15 M/UL (ref 4.6–6.2)
RELATIVE EOSINOPHIL (OHS): 4.9 %
RELATIVE LYMPHOCYTE (OHS): 13.9 % (ref 18–48)
RELATIVE MONOCYTE (OHS): 11.2 % (ref 4–15)
RELATIVE NEUTROPHIL (OHS): 68.5 % (ref 38–73)
SODIUM SERPL-SCNC: 137 MMOL/L (ref 136–145)
TRIGL SERPL-MCNC: 55 MG/DL (ref 30–150)
WBC # BLD AUTO: 10.71 K/UL (ref 3.9–12.7)

## 2025-07-09 PROCEDURE — 1159F MED LIST DOCD IN RCRD: CPT | Mod: CPTII,S$GLB,, | Performed by: FAMILY MEDICINE

## 2025-07-09 PROCEDURE — 36415 COLL VENOUS BLD VENIPUNCTURE: CPT | Mod: PO

## 2025-07-09 PROCEDURE — 1101F PT FALLS ASSESS-DOCD LE1/YR: CPT | Mod: CPTII,S$GLB,, | Performed by: FAMILY MEDICINE

## 2025-07-09 PROCEDURE — 85025 COMPLETE CBC W/AUTO DIFF WBC: CPT

## 2025-07-09 PROCEDURE — 99999 PR PBB SHADOW E&M-EST. PATIENT-LVL IV: CPT | Mod: PBBFAC,,, | Performed by: FAMILY MEDICINE

## 2025-07-09 PROCEDURE — 82465 ASSAY BLD/SERUM CHOLESTEROL: CPT

## 2025-07-09 PROCEDURE — 82247 BILIRUBIN TOTAL: CPT

## 2025-07-09 PROCEDURE — 3078F DIAST BP <80 MM HG: CPT | Mod: CPTII,S$GLB,, | Performed by: FAMILY MEDICINE

## 2025-07-09 PROCEDURE — 3075F SYST BP GE 130 - 139MM HG: CPT | Mod: CPTII,S$GLB,, | Performed by: FAMILY MEDICINE

## 2025-07-09 PROCEDURE — 3008F BODY MASS INDEX DOCD: CPT | Mod: CPTII,S$GLB,, | Performed by: FAMILY MEDICINE

## 2025-07-09 PROCEDURE — 83036 HEMOGLOBIN GLYCOSYLATED A1C: CPT

## 2025-07-09 PROCEDURE — 99214 OFFICE O/P EST MOD 30 MIN: CPT | Mod: S$GLB,,, | Performed by: FAMILY MEDICINE

## 2025-07-09 PROCEDURE — 84153 ASSAY OF PSA TOTAL: CPT

## 2025-07-09 PROCEDURE — 1160F RVW MEDS BY RX/DR IN RCRD: CPT | Mod: CPTII,S$GLB,, | Performed by: FAMILY MEDICINE

## 2025-07-09 PROCEDURE — 3288F FALL RISK ASSESSMENT DOCD: CPT | Mod: CPTII,S$GLB,, | Performed by: FAMILY MEDICINE

## 2025-07-09 PROCEDURE — 82040 ASSAY OF SERUM ALBUMIN: CPT

## 2025-07-09 PROCEDURE — 4010F ACE/ARB THERAPY RXD/TAKEN: CPT | Mod: CPTII,S$GLB,, | Performed by: FAMILY MEDICINE

## 2025-07-09 RX ORDER — KETOROLAC TROMETHAMINE 10 MG/1
10 TABLET, FILM COATED ORAL EVERY 6 HOURS PRN
COMMUNITY
Start: 2025-07-06

## 2025-07-09 RX ORDER — TAMSULOSIN HYDROCHLORIDE 0.4 MG/1
0.4 CAPSULE ORAL DAILY
Qty: 10 CAPSULE | Refills: 0 | Status: SHIPPED | OUTPATIENT
Start: 2025-07-09 | End: 2026-07-09

## 2025-07-09 RX ORDER — TAMSULOSIN HYDROCHLORIDE 0.4 MG/1
CAPSULE ORAL
COMMUNITY
Start: 2025-07-06 | End: 2025-07-09

## 2025-07-09 RX ORDER — HYDROCODONE BITARTRATE AND ACETAMINOPHEN 5; 325 MG/1; MG/1
1 TABLET ORAL EVERY 8 HOURS PRN
Qty: 20 TABLET | Refills: 0 | Status: SHIPPED | OUTPATIENT
Start: 2025-07-09 | End: 2025-07-16

## 2025-07-09 RX ORDER — PANTOPRAZOLE SODIUM 40 MG/1
40 TABLET, DELAYED RELEASE ORAL DAILY
Qty: 30 TABLET | Refills: 11 | Status: SHIPPED | OUTPATIENT
Start: 2025-07-09

## 2025-07-09 RX ORDER — LOSARTAN POTASSIUM 50 MG/1
50 TABLET ORAL DAILY
Qty: 90 TABLET | Refills: 3 | Status: SHIPPED | OUTPATIENT
Start: 2025-07-09 | End: 2026-07-09

## 2025-07-09 RX ORDER — NALOXONE HYDROCHLORIDE 4 MG/.1ML
SPRAY NASAL
Qty: 1 EACH | Refills: 11 | Status: SHIPPED | OUTPATIENT
Start: 2025-07-09

## 2025-07-09 RX ORDER — ONDANSETRON 4 MG/1
4 TABLET, ORALLY DISINTEGRATING ORAL EVERY 8 HOURS PRN
COMMUNITY
Start: 2025-07-06

## 2025-07-09 RX ORDER — TRIAMCINOLONE ACETONIDE 1 MG/G
CREAM TOPICAL 2 TIMES DAILY
Qty: 453 G | Refills: 0 | Status: SHIPPED | OUTPATIENT
Start: 2025-07-09

## 2025-07-09 NOTE — PROGRESS NOTES
Subjective     Patient ID: Pierre Grossman is a 65 y.o. male.    Chief Complaint: Establish Care and Nephrolithiasis    65 year-old male presents for concerns about his low testosterone. He is tired a lot. He feels he is losing some muscle mass. He has interest and desire, but has some issues with erectile dysfunction. He doesn't wake up with erections in the morning.     He had a kidney stone this past weekend. He has not passed it. He was seen and given toradol, flomax, and zofran. He had a CT in [place] and was diagnosed with a 6 mm stone.  He states he is fine right now, but as ways is severe pain.    He also is on amlodipine and noticed some swelling in his legs.  He states he scratches a lot and has lot of red spots on his legs as a result of this.    He has hypertension his blood pressure is controlled.  He is due for labs now.        History of Present Illness             Past Medical History:   Diagnosis Date    Essential (primary) hypertension     Head injury 1986    Nephrolithiasis       Past Surgical History:   Procedure Laterality Date    CERVICAL FUSION      COLONOSCOPY N/A 5/30/2018    Procedure: COLONOSCOPY;  Surgeon: Zakia Mirza MD;  Location: [facility];  Service: Endoscopy;  Laterality: N/A;    EYE SURGERY      cataract    HERNIA REPAIR      TOTAL REPLACEMENT OF HIP JOINT USING COMPUTER-ASSISTED NAVIGATION Right 10/3/2018    Procedure: REPLACEMENT-HIP-TOTAL WITH NAVIGATION;  Surgeon: John L. Ochsner Jr., MD;  Location: [address];  Service: Orthopedics;  Laterality: Right;    TOTAL REPLACEMENT OF HIP JOINT USING COMPUTER-ASSISTED NAVIGATION Left 12/4/2024    Procedure: ARTHROPLASTY, HIP, TOTAL, USING COMPUTER-ASSISTED NAVIGATION;  Surgeon: Mc Colby MD;  Location: [facility] OR;  Service: Orthopedics;  Laterality: Left;  Wallace  Yomi Aldrich 1st Assist notified-[place]  INCOMPLETE H/P  RN PREOP 11/20/2024----- TYPE&SCREEN 11/27/24@8AM--done     Family History   Problem  "Relation Name Age of Onset    Aneurysm Mother          brain    Pancreatitis Father      No Known Problems Sister      No Known Problems Sister      Migraines Sister      No Known Problems Sister      Heart attack Neg Hx      Hypertension Neg Hx      Colon cancer Neg Hx      Esophageal cancer Neg Hx       Social History[1]    Review of Systems         Objective     Vitals:    07/09/25 0854   BP: 132/76   Pulse: 69   Temp: 97.8 °F (36.6 °C)   TempSrc: Oral   SpO2: 96%   Weight: 94.3 kg (207 lb 14.3 oz)   Height: 5' 10" (1.778 m)        Physical Exam  Constitutional:       General: He is not in acute distress.     Appearance: Normal appearance. He is not ill-appearing, toxic-appearing or diaphoretic.   HENT:      Head: Normocephalic and atraumatic.   Cardiovascular:      Rate and Rhythm: Normal rate and regular rhythm.      Pulses: Normal pulses.      Heart sounds: Normal heart sounds. No murmur heard.     No friction rub. No gallop.   Pulmonary:      Effort: Pulmonary effort is normal.   Musculoskeletal:      Right lower leg: Edema present.      Left lower leg: Edema present.   Neurological:      Mental Status: He is alert.       Physical Exam                Assessment and Plan           Pierre was seen today for establish care and nephrolithiasis.    Diagnoses and all orders for this visit:    Erectile dysfunction, unspecified erectile dysfunction type  -     TESTOSTERONE PANEL; Future  -     Prostate Specific Antigen, Diagnostic; Future  -     Ambulatory referral/consult to Urology; Future  Referral to urology. Will order testosterone and psa    Fatigue, unspecified type  -     CBC Auto Differential; Future  -     Comprehensive Metabolic Panel; Future  -     Hemoglobin A1C; Future  Will check for other causes of fatigue    Kidney stone  -     Ambulatory referral/consult to Urology; Future  -     tamsulosin (FLOMAX) 0.4 mg Cap; Take 1 capsule (0.4 mg total) by mouth once daily.  -     HYDROcodone-acetaminophen " (NORCO) 5-325 mg per tablet; Take 1 tablet by mouth every 8 (eight) hours as needed for Pain.  Reordering more flomax. Increase fluid intake . Adding hydrocodone for pain.     Essential hypertension  -     losartan (COZAAR) 50 MG tablet; Take 1 tablet (50 mg total) by mouth once daily.  -     Lipid Panel; Future  Stop amlodipine and start losartan due to leg swelling    Edema, unspecified type  Stop amlodipine    Dermatitis  -     triamcinolone acetonide 0.1% (KENALOG) 0.1 % cream; Apply topically 2 (two) times daily.    Gastroesophageal reflux disease without esophagitis  -     pantoprazole (PROTONIX) 40 MG tablet; Take 1 tablet (40 mg total) by mouth once daily.  Stable. Refilled meds.     Other orders  -     naloxone (NARCAN) 4 mg/actuation Spry; 4mg by nasal route as needed for opioid overdose; may repeat every 2-3 minutes in alternating nostrils until medical help arrives. Call 911        Assessment & Plan                      No follow-ups on file.        This note was generated with the assistance of ambient listening technology. Verbal consent was obtained by the patient and accompanying visitor(s) for the recording of patient appointment to facilitate this note. I attest to having reviewed and edited the generated note for accuracy, though some syntax or spelling errors may persist. Please contact the author of this note for any clarification.         [1]   Social History  Socioeconomic History    Marital status:    Tobacco Use    Smoking status: Never    Smokeless tobacco: Never   Substance and Sexual Activity    Alcohol use: No    Drug use: No    Sexual activity: Yes     Partners: Female

## 2025-07-10 ENCOUNTER — OFFICE VISIT (OUTPATIENT)
Dept: UROLOGY | Facility: CLINIC | Age: 65
End: 2025-07-10
Payer: COMMERCIAL

## 2025-07-10 VITALS — BODY MASS INDEX: 30.15 KG/M2 | WEIGHT: 210.13 LBS

## 2025-07-10 DIAGNOSIS — G47.30 SLEEP APNEA, UNSPECIFIED TYPE: ICD-10-CM

## 2025-07-10 DIAGNOSIS — N22 CALCULUS OF URINARY TRACT IN DISEASES CLASSIFIED ELSEWHERE: ICD-10-CM

## 2025-07-10 DIAGNOSIS — N20.0 KIDNEY STONE: ICD-10-CM

## 2025-07-10 DIAGNOSIS — N52.9 ERECTILE DYSFUNCTION, UNSPECIFIED ERECTILE DYSFUNCTION TYPE: ICD-10-CM

## 2025-07-10 DIAGNOSIS — N20.0 NEPHROLITHIASIS: Primary | ICD-10-CM

## 2025-07-10 PROCEDURE — 99999 PR PBB SHADOW E&M-EST. PATIENT-LVL IV: CPT | Mod: PBBFAC,,, | Performed by: STUDENT IN AN ORGANIZED HEALTH CARE EDUCATION/TRAINING PROGRAM

## 2025-07-10 RX ORDER — TADALAFIL 20 MG/1
20 TABLET ORAL DAILY PRN
Qty: 30 TABLET | Refills: 11 | Status: SHIPPED | OUTPATIENT
Start: 2025-07-10 | End: 2026-07-10

## 2025-07-14 LAB
W ALBUMIN: 3.6 G/DL
W SEX HORMONE BINDING GLOBULIN: 29 NMOL/L
W TESTOSTERONE, BIOAVAIL: 68 NG/DL
W TESTOSTERONE, FREE: 41.4 PG/ML
W TESTOSTERONE, TOTAL, MS: 278 NG/DL

## 2025-08-04 ENCOUNTER — PATIENT MESSAGE (OUTPATIENT)
Dept: FAMILY MEDICINE | Facility: CLINIC | Age: 65
End: 2025-08-04
Payer: COMMERCIAL

## 2025-08-04 DIAGNOSIS — N17.9 ACUTE KIDNEY INJURY: Primary | ICD-10-CM

## 2025-08-11 ENCOUNTER — RESULTS FOLLOW-UP (OUTPATIENT)
Dept: UROLOGY | Facility: HOSPITAL | Age: 65
End: 2025-08-11
Payer: COMMERCIAL

## 2025-08-11 ENCOUNTER — HOSPITAL ENCOUNTER (OUTPATIENT)
Dept: RADIOLOGY | Facility: HOSPITAL | Age: 65
Discharge: HOME | End: 2025-08-11
Attending: STUDENT IN AN ORGANIZED HEALTH CARE EDUCATION/TRAINING PROGRAM
Payer: COMMERCIAL

## 2025-08-11 DIAGNOSIS — N22 CALCULUS OF URINARY TRACT IN DISEASES CLASSIFIED ELSEWHERE: ICD-10-CM

## 2025-08-11 PROCEDURE — 74176 CT ABD & PELVIS W/O CONTRAST: CPT | Mod: TC

## 2025-08-12 ENCOUNTER — TELEPHONE (OUTPATIENT)
Dept: UROLOGY | Facility: CLINIC | Age: 65
End: 2025-08-12
Payer: COMMERCIAL

## 2025-08-15 ENCOUNTER — PATIENT MESSAGE (OUTPATIENT)
Dept: UROLOGY | Facility: CLINIC | Age: 65
End: 2025-08-15
Payer: COMMERCIAL

## 2025-08-27 ENCOUNTER — OFFICE VISIT (OUTPATIENT)
Dept: UROLOGY | Facility: CLINIC | Age: 65
End: 2025-08-27
Payer: COMMERCIAL

## 2025-08-27 DIAGNOSIS — N20.1 RIGHT URETERAL STONE: Primary | ICD-10-CM

## 2025-08-27 PROCEDURE — 4010F ACE/ARB THERAPY RXD/TAKEN: CPT | Mod: CPTII,S$GLB,, | Performed by: STUDENT IN AN ORGANIZED HEALTH CARE EDUCATION/TRAINING PROGRAM

## 2025-08-27 PROCEDURE — 99999 PR PBB SHADOW E&M-EST. PATIENT-LVL III: CPT | Mod: PBBFAC,,, | Performed by: STUDENT IN AN ORGANIZED HEALTH CARE EDUCATION/TRAINING PROGRAM

## 2025-08-27 PROCEDURE — 3288F FALL RISK ASSESSMENT DOCD: CPT | Mod: CPTII,S$GLB,, | Performed by: STUDENT IN AN ORGANIZED HEALTH CARE EDUCATION/TRAINING PROGRAM

## 2025-08-27 PROCEDURE — 1101F PT FALLS ASSESS-DOCD LE1/YR: CPT | Mod: CPTII,S$GLB,, | Performed by: STUDENT IN AN ORGANIZED HEALTH CARE EDUCATION/TRAINING PROGRAM

## 2025-08-27 PROCEDURE — 87086 URINE CULTURE/COLONY COUNT: CPT | Performed by: STUDENT IN AN ORGANIZED HEALTH CARE EDUCATION/TRAINING PROGRAM

## 2025-08-27 PROCEDURE — 3044F HG A1C LEVEL LT 7.0%: CPT | Mod: CPTII,S$GLB,, | Performed by: STUDENT IN AN ORGANIZED HEALTH CARE EDUCATION/TRAINING PROGRAM

## 2025-08-27 PROCEDURE — 99214 OFFICE O/P EST MOD 30 MIN: CPT | Mod: S$GLB,,, | Performed by: STUDENT IN AN ORGANIZED HEALTH CARE EDUCATION/TRAINING PROGRAM

## 2025-08-27 RX ORDER — CEFAZOLIN SODIUM 2 G/50ML
2 SOLUTION INTRAVENOUS
OUTPATIENT
Start: 2025-08-27

## 2025-08-28 ENCOUNTER — HOSPITAL ENCOUNTER (OUTPATIENT)
Dept: PREADMISSION TESTING | Facility: HOSPITAL | Age: 65
Discharge: HOME | End: 2025-08-28
Attending: STUDENT IN AN ORGANIZED HEALTH CARE EDUCATION/TRAINING PROGRAM
Payer: COMMERCIAL

## 2025-08-28 VITALS
HEART RATE: 79 BPM | DIASTOLIC BLOOD PRESSURE: 77 MMHG | HEIGHT: 70 IN | RESPIRATION RATE: 16 BRPM | OXYGEN SATURATION: 95 % | SYSTOLIC BLOOD PRESSURE: 127 MMHG | WEIGHT: 206 LBS | TEMPERATURE: 97 F | BODY MASS INDEX: 29.49 KG/M2

## 2025-08-28 DIAGNOSIS — N20.1 RIGHT URETERAL STONE: ICD-10-CM

## 2025-08-28 LAB
ABSOLUTE EOSINOPHIL (OHS): 0.33 K/UL
ABSOLUTE MONOCYTE (OHS): 0.78 K/UL (ref 0.3–1)
ABSOLUTE NEUTROPHIL COUNT (OHS): 6.45 K/UL (ref 1.8–7.7)
ANION GAP (OHS): 10 MMOL/L (ref 8–16)
BASOPHILS # BLD AUTO: 0.08 K/UL
BASOPHILS NFR BLD AUTO: 0.8 %
BUN SERPL-MCNC: 23 MG/DL (ref 8–23)
CALCIUM SERPL-MCNC: 9.7 MG/DL (ref 8.7–10.5)
CHLORIDE SERPL-SCNC: 106 MMOL/L (ref 95–110)
CO2 SERPL-SCNC: 24 MMOL/L (ref 23–29)
CREAT SERPL-MCNC: 1.9 MG/DL (ref 0.5–1.4)
ERYTHROCYTE [DISTWIDTH] IN BLOOD BY AUTOMATED COUNT: 13 % (ref 11.5–14.5)
GFR SERPLBLD CREATININE-BSD FMLA CKD-EPI: 39 ML/MIN/1.73/M2
GLUCOSE SERPL-MCNC: 105 MG/DL (ref 70–110)
HCT VFR BLD AUTO: 45.5 % (ref 40–54)
HGB BLD-MCNC: 14.7 GM/DL (ref 14–18)
IMM GRANULOCYTES # BLD AUTO: 0.05 K/UL (ref 0–0.04)
IMM GRANULOCYTES NFR BLD AUTO: 0.5 % (ref 0–0.5)
LYMPHOCYTES # BLD AUTO: 1.81 K/UL (ref 1–4.8)
MCH RBC QN AUTO: 27.8 PG (ref 27–31)
MCHC RBC AUTO-ENTMCNC: 32.3 G/DL (ref 32–36)
MCV RBC AUTO: 86 FL (ref 82–98)
NUCLEATED RBC (/100WBC) (OHS): 0 /100 WBC
OHS QRS DURATION: 108 MS
OHS QTC CALCULATION: 417 MS
PLATELET # BLD AUTO: 239 K/UL (ref 150–450)
PMV BLD AUTO: 11 FL (ref 9.2–12.9)
POTASSIUM SERPL-SCNC: 5.1 MMOL/L (ref 3.5–5.1)
RBC # BLD AUTO: 5.29 M/UL (ref 4.6–6.2)
RELATIVE EOSINOPHIL (OHS): 3.5 %
RELATIVE LYMPHOCYTE (OHS): 19.1 % (ref 18–48)
RELATIVE MONOCYTE (OHS): 8.2 % (ref 4–15)
RELATIVE NEUTROPHIL (OHS): 67.9 % (ref 38–73)
SODIUM SERPL-SCNC: 140 MMOL/L (ref 136–145)
WBC # BLD AUTO: 9.5 K/UL (ref 3.9–12.7)

## 2025-08-28 PROCEDURE — 85025 COMPLETE CBC W/AUTO DIFF WBC: CPT

## 2025-08-28 PROCEDURE — 93010 ELECTROCARDIOGRAM REPORT: CPT | Mod: ,,, | Performed by: INTERNAL MEDICINE

## 2025-08-28 PROCEDURE — 93005 ELECTROCARDIOGRAM TRACING: CPT

## 2025-08-28 PROCEDURE — 82310 ASSAY OF CALCIUM: CPT

## 2025-08-29 LAB — BACTERIA UR CULT: NO GROWTH

## 2025-09-02 ENCOUNTER — TELEPHONE (OUTPATIENT)
Dept: UROLOGY | Facility: CLINIC | Age: 65
End: 2025-09-02
Payer: COMMERCIAL

## (undated) DEVICE — TAPE SURG DURAPORE 2 X10YD

## (undated) DEVICE — DRAPE THREE-QTR REINF 53X77IN

## (undated) DEVICE — COVER PROXIMA MAYO STAND

## (undated) DEVICE — SEE MEDLINE ITEM 146347

## (undated) DEVICE — ELECTRODE REM PLYHSV RETURN 9

## (undated) DEVICE — DRESSING TRANS 4X4 TEGADERM

## (undated) DEVICE — GLOVE SENSICARE PI GRN 8

## (undated) DEVICE — PULSAVAC ZIMMER

## (undated) DEVICE — SUT ETHIBOND EXCEL 5-0 V-40

## (undated) DEVICE — SET BASIN 48X48IN 6000ML RING

## (undated) DEVICE — TOWEL WHITE OR DISP

## (undated) DEVICE — EVACUATOR PENCIL SMOKE NEPTUNE

## (undated) DEVICE — BLADE SAGITTAL 18 X 1.27 X 90M

## (undated) DEVICE — KIT TRACKING VIZADISC HIP

## (undated) DEVICE — DRAPE STERI INSTRUMENT 1018

## (undated) DEVICE — DRESSING OPTIFOAM AG 4X12IN

## (undated) DEVICE — DRAPE SURG W/TWL 17 5/8X23

## (undated) DEVICE — SYS CLSR DERMABOND PRINEO 22CM

## (undated) DEVICE — DRAPE STERI U-SHAPED 47X51IN

## (undated) DEVICE — ADHESIVE MASTISOL VIAL 48/BX

## (undated) DEVICE — COVER LIGHT HANDLE 80/CA

## (undated) DEVICE — TRAP FLUID SMOKE EVACUATOR

## (undated) DEVICE — APPLICATOR CHLORAPREP ORN 26ML

## (undated) DEVICE — GOWN B1 X-LG X-LONG

## (undated) DEVICE — SUT VICRYL BR 1 GEN 27 CT-1

## (undated) DEVICE — SCREW BONE 6.5X15 SELF-TAP
Type: IMPLANTABLE DEVICE | Site: HIP | Status: NON-FUNCTIONAL
Removed: 2018-10-03

## (undated) DEVICE — Device

## (undated) DEVICE — HANDPIECE PHOTONSABER Y TIP

## (undated) DEVICE — SOL NACL STRL BOTTLE 1000ML

## (undated) DEVICE — UNDERGLOVES BIOGEL PI SZ 7 LF

## (undated) DEVICE — TRAY FOLEY 16FR INFECTION CONT

## (undated) DEVICE — BIT DRILL PIN TROCAR 3.2X80MM
Type: IMPLANTABLE DEVICE | Site: HIP | Status: NON-FUNCTIONAL
Removed: 2018-10-03

## (undated) DEVICE — NDL ELECTRODE EXTENDED 6

## (undated) DEVICE — DRESSING AQUACEL AG ADV 3.5X12

## (undated) DEVICE — UNDERGLOVES BIOGEL PI SIZE 8

## (undated) DEVICE — KIT CHECKPOINT TIBIAL

## (undated) DEVICE — SEE MEDLINE ITEM 154981

## (undated) DEVICE — SUT VICRYL 3-0 27 SH

## (undated) DEVICE — HOOD T-5 TEAR AWAY STERILE

## (undated) DEVICE — GLOVE BIOGEL SKINSENSE PI 8.0

## (undated) DEVICE — CLOSURE SKIN STERI STRIP 1/4X4

## (undated) DEVICE — NDL SPINAL 18GX3.5 SPINOCAN

## (undated) DEVICE — HOOD T7 W/ PEEL AWAY LENS

## (undated) DEVICE — BIT DRILL DISP 30M

## (undated) DEVICE — SUT STRATAFIX 1 PDS CT-1

## (undated) DEVICE — SEE L#156916

## (undated) DEVICE — MASK FLYTE HOOD PEEL AWAY

## (undated) DEVICE — BNDG COFLEX FOAM LF2 ST 6X5YD

## (undated) DEVICE — HIP SYS NAVITRACKER SZ B
Type: IMPLANTABLE DEVICE | Site: HIP | Status: NON-FUNCTIONAL
Removed: 2018-10-03

## (undated) DEVICE — GLOVE SIGNATURE ESSNTL LTX 7.5

## (undated) DEVICE — BRUSH SCRUB HIBICLENS 4%

## (undated) DEVICE — SUT MONOCRYL 3-0 PS-1

## (undated) DEVICE — ADHESIVE DERMABOND ADVANCED

## (undated) DEVICE — KIT IRR SUCTION HND PIECE

## (undated) DEVICE — SUT VICRYL PLUS 2-0 CT1 18

## (undated) DEVICE — SYR ONLY LUER LOCK 20CC

## (undated) DEVICE — KIT DRAPE RIO ONE PIECE W/POCK

## (undated) DEVICE — KIT TOTAL HIP HPOFH OMC

## (undated) DEVICE — DRAPE STERI INCISE 17X23IN

## (undated) DEVICE — SEE MEDLINE ITEM 152529

## (undated) DEVICE — NDL 18GA X1 1/2 REG BEVEL

## (undated) DEVICE — GLOVE BIOGEL SKINSENSE PI 6.5

## (undated) DEVICE — ALCOHOL 70% ETHYL 16OZ.

## (undated) DEVICE — SUT 2 27IN COATED VICRYL U

## (undated) DEVICE — SPONGE DERMACEA 4X4IN 12PLY

## (undated) DEVICE — SHEET DRAPE FAN-FOLDED 3/4

## (undated) DEVICE — BLADE RECP OFFSET 77.5X11X1.23

## (undated) DEVICE — SPONGE COTTON TRAY 4X4IN

## (undated) DEVICE — SEE MEDLINE ITEM 157131

## (undated) DEVICE — COVER CAMERA OPERATING ROOM

## (undated) DEVICE — DRESSING TRANS 4X4 3/4

## (undated) DEVICE — SEE MEDLINE ITEM 152487